# Patient Record
Sex: FEMALE | Race: WHITE | NOT HISPANIC OR LATINO | URBAN - METROPOLITAN AREA
[De-identification: names, ages, dates, MRNs, and addresses within clinical notes are randomized per-mention and may not be internally consistent; named-entity substitution may affect disease eponyms.]

---

## 2023-02-02 ENCOUNTER — INPATIENT (INPATIENT)
Facility: HOSPITAL | Age: 63
LOS: 11 days | Discharge: HOME HEALTH | End: 2023-02-14
Attending: ORTHOPAEDIC SURGERY | Admitting: ORTHOPAEDIC SURGERY
Payer: MEDICARE

## 2023-02-02 ENCOUNTER — TRANSCRIPTION ENCOUNTER (OUTPATIENT)
Age: 63
End: 2023-02-02

## 2023-02-02 VITALS
DIASTOLIC BLOOD PRESSURE: 59 MMHG | SYSTOLIC BLOOD PRESSURE: 105 MMHG | HEART RATE: 87 BPM | OXYGEN SATURATION: 98 % | TEMPERATURE: 99 F | RESPIRATION RATE: 18 BRPM

## 2023-02-02 DIAGNOSIS — S72.009A FRACTURE OF UNSPECIFIED PART OF NECK OF UNSPECIFIED FEMUR, INITIAL ENCOUNTER FOR CLOSED FRACTURE: ICD-10-CM

## 2023-02-02 LAB
ALBUMIN SERPL ELPH-MCNC: 3.9 G/DL — SIGNIFICANT CHANGE UP (ref 3.3–5)
ALBUMIN SERPL ELPH-MCNC: 4 G/DL — SIGNIFICANT CHANGE UP (ref 3.3–5)
ALP SERPL-CCNC: 98 U/L — SIGNIFICANT CHANGE UP (ref 40–120)
ALT FLD-CCNC: 15 U/L — SIGNIFICANT CHANGE UP (ref 4–33)
ANION GAP SERPL CALC-SCNC: 8 MMOL/L — SIGNIFICANT CHANGE UP (ref 7–14)
APTT BLD: 36.4 SEC — HIGH (ref 27–36.3)
AST SERPL-CCNC: 17 U/L — SIGNIFICANT CHANGE UP (ref 4–32)
BASOPHILS # BLD AUTO: 0.07 K/UL — SIGNIFICANT CHANGE UP (ref 0–0.2)
BASOPHILS NFR BLD AUTO: 4.4 % — HIGH (ref 0–2)
BILIRUB SERPL-MCNC: 0.3 MG/DL — SIGNIFICANT CHANGE UP (ref 0.2–1.2)
BLD GP AB SCN SERPL QL: NEGATIVE — SIGNIFICANT CHANGE UP
BUN SERPL-MCNC: 11 MG/DL — SIGNIFICANT CHANGE UP (ref 7–23)
CALCIUM SERPL-MCNC: 9.4 MG/DL — SIGNIFICANT CHANGE UP (ref 8.4–10.5)
CHLORIDE SERPL-SCNC: 105 MMOL/L — SIGNIFICANT CHANGE UP (ref 98–107)
CO2 SERPL-SCNC: 29 MMOL/L — SIGNIFICANT CHANGE UP (ref 22–31)
CREAT SERPL-MCNC: 0.21 MG/DL — LOW (ref 0.5–1.3)
EGFR: 131 ML/MIN/1.73M2 — SIGNIFICANT CHANGE UP
EOSINOPHIL # BLD AUTO: 0.02 K/UL — SIGNIFICANT CHANGE UP (ref 0–0.5)
EOSINOPHIL NFR BLD AUTO: 0.9 % — SIGNIFICANT CHANGE UP (ref 0–6)
GIANT PLATELETS BLD QL SMEAR: PRESENT — SIGNIFICANT CHANGE UP
GLUCOSE SERPL-MCNC: 100 MG/DL — HIGH (ref 70–99)
HCT VFR BLD CALC: 39.1 % — SIGNIFICANT CHANGE UP (ref 34.5–45)
HGB BLD-MCNC: 12 G/DL — SIGNIFICANT CHANGE UP (ref 11.5–15.5)
IANC: 0.37 K/UL — LOW (ref 1.8–7.4)
INR BLD: 0.96 RATIO — SIGNIFICANT CHANGE UP (ref 0.88–1.16)
LYMPHOCYTES # BLD AUTO: 0.67 K/UL — LOW (ref 1–3.3)
LYMPHOCYTES # BLD AUTO: 40.4 % — SIGNIFICANT CHANGE UP (ref 13–44)
MANUAL SMEAR VERIFICATION: SIGNIFICANT CHANGE UP
MCHC RBC-ENTMCNC: 28.2 PG — SIGNIFICANT CHANGE UP (ref 27–34)
MCHC RBC-ENTMCNC: 30.7 GM/DL — LOW (ref 32–36)
MCV RBC AUTO: 92 FL — SIGNIFICANT CHANGE UP (ref 80–100)
MONOCYTES # BLD AUTO: 0.25 K/UL — SIGNIFICANT CHANGE UP (ref 0–0.9)
MONOCYTES NFR BLD AUTO: 14.9 % — HIGH (ref 2–14)
NEUTROPHILS # BLD AUTO: 0.5 K/UL — LOW (ref 1.8–7.4)
NEUTROPHILS NFR BLD AUTO: 29.8 % — LOW (ref 43–77)
PLAT MORPH BLD: NORMAL — SIGNIFICANT CHANGE UP
PLATELET # BLD AUTO: 137 K/UL — LOW (ref 150–400)
PLATELET COUNT - ESTIMATE: ABNORMAL
POTASSIUM SERPL-MCNC: 3.9 MMOL/L — SIGNIFICANT CHANGE UP (ref 3.5–5.3)
POTASSIUM SERPL-SCNC: 3.9 MMOL/L — SIGNIFICANT CHANGE UP (ref 3.5–5.3)
PROT SERPL-MCNC: 6.6 G/DL — SIGNIFICANT CHANGE UP (ref 6–8.3)
PROTHROM AB SERPL-ACNC: 11.1 SEC — SIGNIFICANT CHANGE UP (ref 10.5–13.4)
RBC # BLD: 4.25 M/UL — SIGNIFICANT CHANGE UP (ref 3.8–5.2)
RBC # FLD: 15 % — HIGH (ref 10.3–14.5)
RBC BLD AUTO: NORMAL — SIGNIFICANT CHANGE UP
RH IG SCN BLD-IMP: POSITIVE — SIGNIFICANT CHANGE UP
SARS-COV-2 RNA SPEC QL NAA+PROBE: SIGNIFICANT CHANGE UP
SODIUM SERPL-SCNC: 142 MMOL/L — SIGNIFICANT CHANGE UP (ref 135–145)
VARIANT LYMPHS # BLD: 9.6 % — HIGH (ref 0–6)
WBC # BLD: 1.67 K/UL — LOW (ref 3.8–10.5)
WBC # FLD AUTO: 1.67 K/UL — LOW (ref 3.8–10.5)

## 2023-02-02 PROCEDURE — 71045 X-RAY EXAM CHEST 1 VIEW: CPT | Mod: 26

## 2023-02-02 PROCEDURE — 73552 X-RAY EXAM OF FEMUR 2/>: CPT | Mod: 26,RT

## 2023-02-02 PROCEDURE — 73600 X-RAY EXAM OF ANKLE: CPT | Mod: 26,RT

## 2023-02-02 PROCEDURE — 99285 EMERGENCY DEPT VISIT HI MDM: CPT | Mod: GC

## 2023-02-02 PROCEDURE — 73502 X-RAY EXAM HIP UNI 2-3 VIEWS: CPT | Mod: 26,RT

## 2023-02-02 PROCEDURE — 73560 X-RAY EXAM OF KNEE 1 OR 2: CPT | Mod: 26,RT

## 2023-02-02 RX ORDER — SODIUM CHLORIDE 9 MG/ML
1000 INJECTION INTRAMUSCULAR; INTRAVENOUS; SUBCUTANEOUS
Refills: 0 | Status: DISCONTINUED | OUTPATIENT
Start: 2023-02-02 | End: 2023-02-05

## 2023-02-02 RX ORDER — OXYCODONE HYDROCHLORIDE 5 MG/1
5 TABLET ORAL EVERY 4 HOURS
Refills: 0 | Status: DISCONTINUED | OUTPATIENT
Start: 2023-02-02 | End: 2023-02-08

## 2023-02-02 RX ORDER — LANOLIN ALCOHOL/MO/W.PET/CERES
3 CREAM (GRAM) TOPICAL AT BEDTIME
Refills: 0 | Status: DISCONTINUED | OUTPATIENT
Start: 2023-02-02 | End: 2023-02-14

## 2023-02-02 RX ORDER — SENNA PLUS 8.6 MG/1
2 TABLET ORAL AT BEDTIME
Refills: 0 | Status: DISCONTINUED | OUTPATIENT
Start: 2023-02-02 | End: 2023-02-14

## 2023-02-02 RX ORDER — OXYCODONE HYDROCHLORIDE 5 MG/1
2.5 TABLET ORAL EVERY 4 HOURS
Refills: 0 | Status: DISCONTINUED | OUTPATIENT
Start: 2023-02-02 | End: 2023-02-08

## 2023-02-02 RX ORDER — ACETAMINOPHEN 500 MG
1000 TABLET ORAL ONCE
Refills: 0 | Status: COMPLETED | OUTPATIENT
Start: 2023-02-02 | End: 2023-02-02

## 2023-02-02 RX ORDER — MAGNESIUM HYDROXIDE 400 MG/1
30 TABLET, CHEWABLE ORAL DAILY
Refills: 0 | Status: DISCONTINUED | OUTPATIENT
Start: 2023-02-02 | End: 2023-02-05

## 2023-02-02 RX ORDER — ACETAMINOPHEN 500 MG
975 TABLET ORAL EVERY 8 HOURS
Refills: 0 | Status: DISCONTINUED | OUTPATIENT
Start: 2023-02-02 | End: 2023-02-05

## 2023-02-02 RX ADMIN — Medication 400 MILLIGRAM(S): at 20:59

## 2023-02-02 RX ADMIN — SODIUM CHLORIDE 100 MILLILITER(S): 9 INJECTION INTRAMUSCULAR; INTRAVENOUS; SUBCUTANEOUS at 21:24

## 2023-02-02 RX ADMIN — Medication 1000 MILLIGRAM(S): at 21:24
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
## 2023-02-02 NOTE — ED PROVIDER NOTE - PHYSICAL EXAMINATION
ear pain General: non-toxic, NAD  HEENT: NCAT, PERRL  Cardiac: RRR, holosystolic murmur, 2+ peripheral pulses  Resp: CTAB  Abdomen: soft, non-distended, bowel sounds present, no ttp, no rebound or guarding. no organomegaly  Extremities: spastic with plantar flexed lower extremities. R hip internally rotated. palpable pulses with brisk capillary refill.   Skin: no rashes or ecchymoses noted.   Neuro: AAOx4, baseline muscle strength of upper extremities. 0/5 lower extremity strength bilaterally. unable to move her toes at baseline. sensation intact  Psych: mood and affect appropriate

## 2023-02-02 NOTE — ED ADULT TRIAGE NOTE - CHIEF COMPLAINT QUOTE
NP called EMS for altered mental status has hx of MS pt arrives A&Ox4  c/o right hip pain and right leg pain
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
## 2023-02-02 NOTE — ED ADULT TRIAGE NOTE - CCCP TRG CHIEF CMPLNT
pt had AIDE this am was left on her own for several hours parents were told to bring pt to hospital/pain, hip

## 2023-02-02 NOTE — H&P ADULT - ASSESSMENT
63yFemale with Right Transcervical Femoral Neck Fx    - Pain control  - IS  - Continue home medications  - Regular Diet, NPOpMN/IVF  - CBC/BMP/Coags/UA/T+S x2  - EKG/CXR  -  Medical clearance prior to planned procedure  - Plan for OR for Right hip herrera

## 2023-02-02 NOTE — ED ADULT NURSE REASSESSMENT NOTE - NS ED NURSE REASSESS COMMENT FT1
Report given to [facility] 1 RN, Alisha. Resp even and unlabored. No acute distress noted. Speaking in full and complete sentences. IV maintenance fluids running as ordered. Safety maintained.

## 2023-02-02 NOTE — ED PROVIDER NOTE - NSICDXFAMILYHX_GEN_ALL_CORE_FT
FAMILY HISTORY:  Father  Still living? Yes, Estimated age: 81-90  Family history of hypercholesterolemia, Age at diagnosis: Age Unknown  Family history of hypertension, Age at diagnosis: Age Unknown    Mother  Still living? Yes, Estimated age: 71-80  Family history of breast cancer, Age at diagnosis: Age Unknown  Family history of hypertension, Age at diagnosis: Age Unknown

## 2023-02-02 NOTE — ED PROVIDER NOTE - OBJECTIVE STATEMENT
63y female hx MS bedbound s/p fall to R knee 11y ago presents after her visiting NP discovered that she was home alone because her 24/7 aide service did not show up today. Pt notes that this aide service is new since january, and at the end of december, the new aide turned her with her affected leg up, which was never done and immediately caused her pain. Since that time, she has had persistent pain in the hip knee and ankle on the right side. no numbness or tingling. unable to walk or move her bilateral lower extremities at baseline. did not hear a pop or crack at the time. otherwise feels in her usual state of health.
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
## 2023-02-02 NOTE — ED PROVIDER NOTE - PROGRESS NOTE DETAILS
PHYSICAL THERAPY TREATMENT:    Date: 8/10/2017    Visit # since seen by PT:  2  ASSESSMENT:   Patient is a 60 year old female admitted to [facility] for right TKA. Lives alone but planning to have daughter stay with her initially for assist prn.      Patient is displaying good progress as evidenced by tolerance for activity despite pain rating of 8/10.      At this time the patient continues to demonstrate decreased range of motion, decreased strength, pain, decreased activity tolerance which is limiting the completion of all functional mobility.  Further skilled physical therapy is required to address these limitations in an attempt to maximize the patient's independence.    After today's session, this therapist is recommending the following location for optimal discharge:    Recommendation for Discharge: PT: Home, Home therapy, OP therapy  Recommendations for Discharge: OT: Home (assist with ADLs and IADLs prn)       Equipment for discharge: 2 wheeled walker     Focus of today's therapy session:   review of the HEP and gait trainng.  See below for further details.    Treatment Plan for Next Session:   transfers, ambulation, stairs, therapeutic exercise    Precautions: WBAT RLE  Activity orders: as tolerated  Basic Lines: Capped IV    SUBJECTIVE:   Patient reports agreeable to therapy    Cognition: Alert and [place] x3    Pain:  At Rest: 8/10  With Activity: 8/10     MOBILITY/EXERCISE:    Bed mobility:   Sit to Supine: Supervision (sup)    Transfers:   Device Used: 2 wheeled walker  Sit to Stand: Stand By Assistance (SBA)  Stand to Sit: Stand By Assistance (SBA)  Stand Pivot: Stand By Assistance (SBA)    Ambulation:   ambulated 300' with CGA and 2ww with a continuous pattern and decreased stance on the Rt.       Stairs:   Not addressed this session    Balance:  Static sitting: Independent  Dynamic sitting: Independent  Static standing:  Touching/Steadying Assistance  Dynamic standing: Touching/Steadying  Assistance    Exercises:  Supine: Ankle Pumps, Quad Sets, Terminal Knee Extension, Straight Leg Raises, Heel Slides, Hip Abduction/Adduction  Sitting: Knee Flexion, Knee Extension     OBSERVATION:   Vital Signs:   not warranted at this time    Activity Tolerance:   no limits in patient's ability to participate in session     EDUCATION:    Learner(s): patient  Education topics covered this session:  Bed Mobility, Transfers, Gait, Safety Awareness, HEP/Exercises, Therapy Plan of Care and Home Safety  Please refer to patient education record for further information regarding patient's learning assessment.     GOALS:     Review Date: 8/16/2017  1. Patient will complete HEP with [place].  Goal met 8/10/17   2. Patient will complete bed mobility with Modified [place] (mod I).  Goal met 8/10/17   3. Patient will complete stand pivot transfer with Modified [place] (mod I) and 4 wheeled walker.      4. Patient will ambulate 150 feet with Modified [place] (mod I) and 4 wheeled walker.      5. Patient will demonstrate 5-90 degrees right knee flexion.         Rehab potential is good due to the following positive factors age, family support, motivation level; and is impacted by the following negative factors not apparent at this time     PLAN OF CARE:    PT Frequency: Twice a day  Duration: LOS  Treatment/Interventions: Functional transfer training, Strengthening, ROM, Gait training, Bed mobility     PT Time Spent: 22 minutes (08/10/17 3734)   Adolph, PGY3: patient w/ impacted femoral neck fracture. Ortho aware and will evaluate Brandy Anthony PGY-3 Patient signed out to me by previous team pending Ortho evaluation.  Patient nonambulatory at baseline found to have impacted femoral neck fracture.   plan for patient to the OR.

## 2023-02-02 NOTE — ED PROVIDER NOTE - NS ED ROS FT
Constitutional: no fevers, chills  HEENT: no HA, vision changes, rhinorrhea, sore throat  Cardiac: no chest pain, palpitations  Respiratory: no SOB, cough or hemoptysis  GI: no n/v/d/c, abd pain, bloody or dark stools  : no dysuria, frequency, or hematuria  MSK: +joint pain, no neck pain no back pain  Skin: no rashes, jaundice, pruritis  Neuro: no numbness/tingling, baseline weakness, bedbound.   ROS otherwise negative except per hpi

## 2023-02-03 ENCOUNTER — TRANSCRIPTION ENCOUNTER (OUTPATIENT)
Age: 63
End: 2023-02-03

## 2023-02-03 DIAGNOSIS — S72.001A FRACTURE OF UNSPECIFIED PART OF NECK OF RIGHT FEMUR, INITIAL ENCOUNTER FOR CLOSED FRACTURE: ICD-10-CM

## 2023-02-03 DIAGNOSIS — R63.8 OTHER SYMPTOMS AND SIGNS CONCERNING FOOD AND FLUID INTAKE: ICD-10-CM

## 2023-02-03 DIAGNOSIS — G50.0 TRIGEMINAL NEURALGIA: ICD-10-CM

## 2023-02-03 DIAGNOSIS — G35 MULTIPLE SCLEROSIS: ICD-10-CM

## 2023-02-03 DIAGNOSIS — F32.9 MAJOR DEPRESSIVE DISORDER, SINGLE EPISODE, UNSPECIFIED: ICD-10-CM

## 2023-02-03 LAB
24R-OH-CALCIDIOL SERPL-MCNC: 74 NG/ML — SIGNIFICANT CHANGE UP (ref 30–80)
ANION GAP SERPL CALC-SCNC: 12 MMOL/L — SIGNIFICANT CHANGE UP (ref 7–14)
ANION GAP SERPL CALC-SCNC: 13 MMOL/L — SIGNIFICANT CHANGE UP (ref 7–14)
ANION GAP SERPL CALC-SCNC: 9 MMOL/L — SIGNIFICANT CHANGE UP (ref 7–14)
APTT BLD: 38 SEC — HIGH (ref 27–36.3)
BLD GP AB SCN SERPL QL: NEGATIVE — SIGNIFICANT CHANGE UP
BUN SERPL-MCNC: 10 MG/DL — SIGNIFICANT CHANGE UP (ref 7–23)
BUN SERPL-MCNC: 10 MG/DL — SIGNIFICANT CHANGE UP (ref 7–23)
BUN SERPL-MCNC: 6 MG/DL — LOW (ref 7–23)
CALCIUM SERPL-MCNC: 8.9 MG/DL — SIGNIFICANT CHANGE UP (ref 8.4–10.5)
CALCIUM SERPL-MCNC: 8.9 MG/DL — SIGNIFICANT CHANGE UP (ref 8.4–10.5)
CALCIUM SERPL-MCNC: 9 MG/DL — SIGNIFICANT CHANGE UP (ref 8.4–10.5)
CHLORIDE SERPL-SCNC: 105 MMOL/L — SIGNIFICANT CHANGE UP (ref 98–107)
CHLORIDE SERPL-SCNC: 107 MMOL/L — SIGNIFICANT CHANGE UP (ref 98–107)
CHLORIDE SERPL-SCNC: 108 MMOL/L — HIGH (ref 98–107)
CO2 SERPL-SCNC: 24 MMOL/L — SIGNIFICANT CHANGE UP (ref 22–31)
CO2 SERPL-SCNC: 24 MMOL/L — SIGNIFICANT CHANGE UP (ref 22–31)
CO2 SERPL-SCNC: 25 MMOL/L — SIGNIFICANT CHANGE UP (ref 22–31)
CREAT SERPL-MCNC: 0.2 MG/DL — LOW (ref 0.5–1.3)
CREAT SERPL-MCNC: 0.23 MG/DL — LOW (ref 0.5–1.3)
CREAT SERPL-MCNC: <0.2 MG/DL — LOW (ref 0.5–1.3)
EGFR: 128 ML/MIN/1.73M2 — SIGNIFICANT CHANGE UP
EGFR: 132 ML/MIN/1.73M2 — SIGNIFICANT CHANGE UP
EGFR: 132 ML/MIN/1.73M2 — SIGNIFICANT CHANGE UP
GLUCOSE SERPL-MCNC: 123 MG/DL — HIGH (ref 70–99)
GLUCOSE SERPL-MCNC: 89 MG/DL — SIGNIFICANT CHANGE UP (ref 70–99)
GLUCOSE SERPL-MCNC: 90 MG/DL — SIGNIFICANT CHANGE UP (ref 70–99)
HCG SERPL-ACNC: 7.1 MIU/ML — SIGNIFICANT CHANGE UP
HCT VFR BLD CALC: 37.2 % — SIGNIFICANT CHANGE UP (ref 34.5–45)
HCT VFR BLD CALC: 43.1 % — SIGNIFICANT CHANGE UP (ref 34.5–45)
HGB BLD-MCNC: 11.3 G/DL — LOW (ref 11.5–15.5)
HGB BLD-MCNC: 13.5 G/DL — SIGNIFICANT CHANGE UP (ref 11.5–15.5)
INR BLD: 0.95 RATIO — SIGNIFICANT CHANGE UP (ref 0.88–1.16)
MCHC RBC-ENTMCNC: 28.1 PG — SIGNIFICANT CHANGE UP (ref 27–34)
MCHC RBC-ENTMCNC: 28.8 PG — SIGNIFICANT CHANGE UP (ref 27–34)
MCHC RBC-ENTMCNC: 30.4 GM/DL — LOW (ref 32–36)
MCHC RBC-ENTMCNC: 31.3 GM/DL — LOW (ref 32–36)
MCV RBC AUTO: 91.9 FL — SIGNIFICANT CHANGE UP (ref 80–100)
MCV RBC AUTO: 92.5 FL — SIGNIFICANT CHANGE UP (ref 80–100)
NRBC # BLD: 0 /100 WBCS — SIGNIFICANT CHANGE UP (ref 0–0)
NRBC # BLD: 0 /100 WBCS — SIGNIFICANT CHANGE UP (ref 0–0)
NRBC # FLD: 0 K/UL — SIGNIFICANT CHANGE UP (ref 0–0)
NRBC # FLD: 0 K/UL — SIGNIFICANT CHANGE UP (ref 0–0)
PLATELET # BLD AUTO: 135 K/UL — LOW (ref 150–400)
PLATELET # BLD AUTO: 159 K/UL — SIGNIFICANT CHANGE UP (ref 150–400)
POTASSIUM SERPL-MCNC: 3.3 MMOL/L — LOW (ref 3.5–5.3)
POTASSIUM SERPL-MCNC: 4.4 MMOL/L — SIGNIFICANT CHANGE UP (ref 3.5–5.3)
POTASSIUM SERPL-MCNC: 5.3 MMOL/L — SIGNIFICANT CHANGE UP (ref 3.5–5.3)
POTASSIUM SERPL-SCNC: 3.3 MMOL/L — LOW (ref 3.5–5.3)
POTASSIUM SERPL-SCNC: 4.4 MMOL/L — SIGNIFICANT CHANGE UP (ref 3.5–5.3)
POTASSIUM SERPL-SCNC: 5.3 MMOL/L — SIGNIFICANT CHANGE UP (ref 3.5–5.3)
PROTHROM AB SERPL-ACNC: 11 SEC — SIGNIFICANT CHANGE UP (ref 10.5–13.4)
RBC # BLD: 4.02 M/UL — SIGNIFICANT CHANGE UP (ref 3.8–5.2)
RBC # BLD: 4.69 M/UL — SIGNIFICANT CHANGE UP (ref 3.8–5.2)
RBC # FLD: 15 % — HIGH (ref 10.3–14.5)
RBC # FLD: 15.1 % — HIGH (ref 10.3–14.5)
RH IG SCN BLD-IMP: POSITIVE — SIGNIFICANT CHANGE UP
SODIUM SERPL-SCNC: 142 MMOL/L — SIGNIFICANT CHANGE UP (ref 135–145)
SODIUM SERPL-SCNC: 142 MMOL/L — SIGNIFICANT CHANGE UP (ref 135–145)
SODIUM SERPL-SCNC: 143 MMOL/L — SIGNIFICANT CHANGE UP (ref 135–145)
WBC # BLD: 2 K/UL — LOW (ref 3.8–10.5)
WBC # BLD: 5.32 K/UL — SIGNIFICANT CHANGE UP (ref 3.8–10.5)
WBC # FLD AUTO: 2 K/UL — LOW (ref 3.8–10.5)
WBC # FLD AUTO: 5.32 K/UL — SIGNIFICANT CHANGE UP (ref 3.8–10.5)

## 2023-02-03 PROCEDURE — 72170 X-RAY EXAM OF PELVIS: CPT | Mod: 26

## 2023-02-03 PROCEDURE — 27236 TREAT THIGH FRACTURE: CPT | Mod: RT

## 2023-02-03 PROCEDURE — 99497 ADVNCD CARE PLAN 30 MIN: CPT

## 2023-02-03 PROCEDURE — 99223 1ST HOSP IP/OBS HIGH 75: CPT

## 2023-02-03 PROCEDURE — 93306 TTE W/DOPPLER COMPLETE: CPT | Mod: 26

## 2023-02-03 DEVICE — IMPLANTABLE DEVICE: Type: IMPLANTABLE DEVICE | Site: RIGHT | Status: FUNCTIONAL

## 2023-02-03 DEVICE — HEAD FEM BI-P UHR 26MM 42MM: Type: IMPLANTABLE DEVICE | Site: RIGHT | Status: FUNCTIONAL

## 2023-02-03 DEVICE — RESTRIC CEM BUCK 18.5MM: Type: IMPLANTABLE DEVICE | Site: RIGHT | Status: FUNCTIONAL

## 2023-02-03 DEVICE — HEAD FEM LFIT V40 26MM -3MM NK: Type: IMPLANTABLE DEVICE | Site: RIGHT | Status: FUNCTIONAL

## 2023-02-03 DEVICE — CEMENT SIMPLEX P 40GM: Type: IMPLANTABLE DEVICE | Site: RIGHT | Status: FUNCTIONAL

## 2023-02-03 RX ORDER — ONDANSETRON 8 MG/1
4 TABLET, FILM COATED ORAL EVERY 6 HOURS
Refills: 0 | Status: DISCONTINUED | OUTPATIENT
Start: 2023-02-03 | End: 2023-02-14

## 2023-02-03 RX ORDER — QUETIAPINE FUMARATE 200 MG/1
50 TABLET, FILM COATED ORAL AT BEDTIME
Refills: 0 | Status: DISCONTINUED | OUTPATIENT
Start: 2023-02-03 | End: 2023-02-14

## 2023-02-03 RX ORDER — ATORVASTATIN CALCIUM 80 MG/1
1 TABLET, FILM COATED ORAL
Qty: 0 | Refills: 0 | DISCHARGE

## 2023-02-03 RX ORDER — HYDROMORPHONE HYDROCHLORIDE 2 MG/ML
0.25 INJECTION INTRAMUSCULAR; INTRAVENOUS; SUBCUTANEOUS
Refills: 0 | Status: DISCONTINUED | OUTPATIENT
Start: 2023-02-03 | End: 2023-02-03

## 2023-02-03 RX ORDER — QUETIAPINE FUMARATE 200 MG/1
1 TABLET, FILM COATED ORAL
Qty: 0 | Refills: 0 | DISCHARGE

## 2023-02-03 RX ORDER — ALPRAZOLAM 0.25 MG
1 TABLET ORAL
Qty: 0 | Refills: 0 | DISCHARGE

## 2023-02-03 RX ORDER — TRAZODONE HCL 50 MG
100 TABLET ORAL ONCE
Refills: 0 | Status: COMPLETED | OUTPATIENT
Start: 2023-02-03 | End: 2023-02-03

## 2023-02-03 RX ORDER — DEXTROAMPHETAMINE SACCHARATE, AMPHETAMINE ASPARTATE, DEXTROAMPHETAMINE SULFATE AND AMPHETAMINE SULFATE 1.875; 1.875; 1.875; 1.875 MG/1; MG/1; MG/1; MG/1
1 TABLET ORAL
Qty: 0 | Refills: 0 | DISCHARGE

## 2023-02-03 RX ORDER — ALPRAZOLAM 0.25 MG
0.5 TABLET ORAL EVERY 12 HOURS
Refills: 0 | Status: DISCONTINUED | OUTPATIENT
Start: 2023-02-03 | End: 2023-02-09

## 2023-02-03 RX ORDER — APIXABAN 2.5 MG/1
2.5 TABLET, FILM COATED ORAL EVERY 12 HOURS
Refills: 0 | Status: DISCONTINUED | OUTPATIENT
Start: 2023-02-03 | End: 2023-02-14

## 2023-02-03 RX ORDER — HYDROMORPHONE HYDROCHLORIDE 2 MG/ML
0.5 INJECTION INTRAMUSCULAR; INTRAVENOUS; SUBCUTANEOUS
Refills: 0 | Status: DISCONTINUED | OUTPATIENT
Start: 2023-02-03 | End: 2023-02-03

## 2023-02-03 RX ORDER — BACLOFEN 100 %
5 POWDER (GRAM) MISCELLANEOUS EVERY 8 HOURS
Refills: 0 | Status: DISCONTINUED | OUTPATIENT
Start: 2023-02-03 | End: 2023-02-14

## 2023-02-03 RX ORDER — GABAPENTIN 400 MG/1
300 CAPSULE ORAL EVERY 8 HOURS
Refills: 0 | Status: DISCONTINUED | OUTPATIENT
Start: 2023-02-03 | End: 2023-02-14

## 2023-02-03 RX ORDER — TRAZODONE HCL 50 MG
100 TABLET ORAL AT BEDTIME
Refills: 0 | Status: DISCONTINUED | OUTPATIENT
Start: 2023-02-03 | End: 2023-02-14

## 2023-02-03 RX ORDER — POVIDONE-IODINE 5 %
1 AEROSOL (ML) TOPICAL ONCE
Refills: 0 | Status: DISCONTINUED | OUTPATIENT
Start: 2023-02-03 | End: 2023-02-14

## 2023-02-03 RX ORDER — BACLOFEN 100 %
1 POWDER (GRAM) MISCELLANEOUS
Qty: 0 | Refills: 0 | DISCHARGE

## 2023-02-03 RX ORDER — SODIUM CHLORIDE 9 MG/ML
1000 INJECTION, SOLUTION INTRAVENOUS
Refills: 0 | Status: DISCONTINUED | OUTPATIENT
Start: 2023-02-03 | End: 2023-02-03

## 2023-02-03 RX ORDER — POTASSIUM CHLORIDE 20 MEQ
40 PACKET (EA) ORAL EVERY 4 HOURS
Refills: 0 | Status: COMPLETED | OUTPATIENT
Start: 2023-02-03 | End: 2023-02-03

## 2023-02-03 RX ORDER — TRAZODONE HCL 50 MG
1 TABLET ORAL
Qty: 0 | Refills: 0 | DISCHARGE

## 2023-02-03 RX ORDER — MODAFINIL 200 MG/1
1 TABLET ORAL
Qty: 0 | Refills: 0 | DISCHARGE

## 2023-02-03 RX ORDER — POTASSIUM CHLORIDE 20 MEQ
10 PACKET (EA) ORAL
Refills: 0 | Status: DISCONTINUED | OUTPATIENT
Start: 2023-02-03 | End: 2023-02-03

## 2023-02-03 RX ORDER — ESCITALOPRAM OXALATE 10 MG/1
20 TABLET, FILM COATED ORAL DAILY
Refills: 0 | Status: DISCONTINUED | OUTPATIENT
Start: 2023-02-03 | End: 2023-02-14

## 2023-02-03 RX ORDER — POLYETHYLENE GLYCOL 3350 17 G/17G
17 POWDER, FOR SOLUTION ORAL DAILY
Refills: 0 | Status: DISCONTINUED | OUTPATIENT
Start: 2023-02-03 | End: 2023-02-14

## 2023-02-03 RX ORDER — ONDANSETRON 8 MG/1
4 TABLET, FILM COATED ORAL ONCE
Refills: 0 | Status: DISCONTINUED | OUTPATIENT
Start: 2023-02-03 | End: 2023-02-03

## 2023-02-03 RX ORDER — CEFAZOLIN SODIUM 1 G
2000 VIAL (EA) INJECTION EVERY 8 HOURS
Refills: 0 | Status: COMPLETED | OUTPATIENT
Start: 2023-02-03 | End: 2023-02-04

## 2023-02-03 RX ORDER — ACETAMINOPHEN 500 MG
975 TABLET ORAL EVERY 8 HOURS
Refills: 0 | Status: DISCONTINUED | OUTPATIENT
Start: 2023-02-03 | End: 2023-02-07

## 2023-02-03 RX ORDER — CHLORHEXIDINE GLUCONATE 213 G/1000ML
1 SOLUTION TOPICAL ONCE
Refills: 0 | Status: COMPLETED | OUTPATIENT
Start: 2023-02-03 | End: 2023-02-03

## 2023-02-03 RX ORDER — LAMOTRIGINE 25 MG/1
100 TABLET, ORALLY DISINTEGRATING ORAL EVERY 12 HOURS
Refills: 0 | Status: DISCONTINUED | OUTPATIENT
Start: 2023-02-03 | End: 2023-02-14

## 2023-02-03 RX ORDER — FENTANYL CITRATE 50 UG/ML
25 INJECTION INTRAVENOUS
Refills: 0 | Status: DISCONTINUED | OUTPATIENT
Start: 2023-02-03 | End: 2023-02-03

## 2023-02-03 RX ADMIN — Medication 975 MILLIGRAM(S): at 22:30

## 2023-02-03 RX ADMIN — HYDROMORPHONE HYDROCHLORIDE 0.5 MILLIGRAM(S): 2 INJECTION INTRAMUSCULAR; INTRAVENOUS; SUBCUTANEOUS at 16:00

## 2023-02-03 RX ADMIN — HYDROMORPHONE HYDROCHLORIDE 0.5 MILLIGRAM(S): 2 INJECTION INTRAMUSCULAR; INTRAVENOUS; SUBCUTANEOUS at 15:30

## 2023-02-03 RX ADMIN — APIXABAN 2.5 MILLIGRAM(S): 2.5 TABLET, FILM COATED ORAL at 18:20

## 2023-02-03 RX ADMIN — Medication 100 MILLIGRAM(S): at 21:23

## 2023-02-03 RX ADMIN — Medication 975 MILLIGRAM(S): at 21:12

## 2023-02-03 RX ADMIN — GABAPENTIN 300 MILLIGRAM(S): 400 CAPSULE ORAL at 05:52

## 2023-02-03 RX ADMIN — GABAPENTIN 300 MILLIGRAM(S): 400 CAPSULE ORAL at 16:30

## 2023-02-03 RX ADMIN — Medication 975 MILLIGRAM(S): at 16:00

## 2023-02-03 RX ADMIN — QUETIAPINE FUMARATE 50 MILLIGRAM(S): 200 TABLET, FILM COATED ORAL at 21:13

## 2023-02-03 RX ADMIN — OXYCODONE HYDROCHLORIDE 5 MILLIGRAM(S): 5 TABLET ORAL at 16:50

## 2023-02-03 RX ADMIN — SODIUM CHLORIDE 100 MILLILITER(S): 9 INJECTION INTRAMUSCULAR; INTRAVENOUS; SUBCUTANEOUS at 10:19

## 2023-02-03 RX ADMIN — Medication 40 MILLIEQUIVALENT(S): at 10:20

## 2023-02-03 RX ADMIN — Medication 5 MILLIGRAM(S): at 18:19

## 2023-02-03 RX ADMIN — GABAPENTIN 300 MILLIGRAM(S): 400 CAPSULE ORAL at 21:13

## 2023-02-03 RX ADMIN — LAMOTRIGINE 100 MILLIGRAM(S): 25 TABLET, ORALLY DISINTEGRATING ORAL at 21:13

## 2023-02-03 RX ADMIN — SENNA PLUS 2 TABLET(S): 8.6 TABLET ORAL at 21:13

## 2023-02-03 RX ADMIN — Medication 975 MILLIGRAM(S): at 05:52

## 2023-02-03 RX ADMIN — Medication 975 MILLIGRAM(S): at 17:00

## 2023-02-03 RX ADMIN — Medication 0.5 MILLIGRAM(S): at 00:50

## 2023-02-03 RX ADMIN — LAMOTRIGINE 100 MILLIGRAM(S): 25 TABLET, ORALLY DISINTEGRATING ORAL at 10:20

## 2023-02-03 RX ADMIN — SODIUM CHLORIDE 75 MILLILITER(S): 9 INJECTION, SOLUTION INTRAVENOUS at 15:31

## 2023-02-03 RX ADMIN — Medication 40 MILLIEQUIVALENT(S): at 05:52

## 2023-02-03 NOTE — PRE-OP CHECKLIST - HEIGHT IN CM
Pt reports her  from [facility] had reported that the information on her paperwork indicates she gets migraines 2-3 times a month and she needs it changed to that she gets it 2-3 times a week.  Pt states we can just write a letter indicating the time frequency change.    Routed to Dr. Feliciano to please advise on letter.    157.5

## 2023-02-03 NOTE — PROGRESS NOTE ADULT - ASSESSMENT
A/P  63yFemale with Right Transcervical Femoral Neck Fx    - Pain control  - IS  - Continue home medications- will discuss with neuro post operatively for inpatient medication recommendations   - Regular Diet, NPO, IVF  - FU AM pre op labs  - FU echo per medicine recommendations for cardiac clearance for surgery   - Plan for OR for Right hip herrera

## 2023-02-03 NOTE — CONSULT NOTE ADULT - PROBLEM SELECTOR RECOMMENDATION 2
-consult neurology in am to approve Vumerity patient has her medications at bedside  -c/w baclofen 5 mg TID prn muscle spasms

## 2023-02-03 NOTE — CONSULT NOTE ADULT - ATTENDING COMMENTS
Resting TTE without significant abnormality.  Patient is without evidence of active cardiac ischemia, uncontrolled arrhythmia, or decompensated heart failure.  No cardiac contraindication to the planned procedure.

## 2023-02-03 NOTE — PATIENT PROFILE ADULT - NSPROGENOTHERPROVIDER_GEN_A_NUR
CHIEF COMPLAINT  Hypertension, labs    HPI  Laureen Munoz is a 70 y.o. male who presents today for the following     Hypertension  Meds: Metoprolol 25 mg QD, losartan, 100 mg QD; taking as prescribed.   He did not check BP at home.  Denies: - headaches, vision problems, tinnitus.  - chest pain/pressure, palpitations, irregular heart beats, exertional, dyspnea, peripheral edema.  - medication side effect: unusual fatigue, slow heartbeat, foot/leg swelling.  Low salt diet: no  Diet: regular  Exercise: < 4 d/w  BMI: 35  FH of HTN: neg     Hypercholesterolemia, Gilbert sy  The patient had slightly elevated cholesterol/triglycerides.   No meds.  Diet / Exercise/BMI: As above  FH: neg  LFTs were normal, except slightly elevated total bilirubin.     IFG  The patient had elevated FBG.  No polydipsia, polyphagia, polyuria.  No abdominal pain, weight loss, fatigue.  Diet / Exercise/BMI: As above  FH of DM: neg     Hypovitaminosis D  The patient had low vitamin D level.  Vitamin D supplement: started high dose.     Thrombocytopenia  The patient had borderline low PLT count.  Denies easy bleeding or bruising.     BPH  Nocturia: 2-3   - was on multiple meds in past    Denies:   · Frequency  · Urgency  · Weak stream/dribbling  · Bladder fullness / incomplete emptying  · Hesitancy     Reviewed PMH, PSH, FH, SH, ALL, HCM/IMM, no changes  Reviewed MEDS, no changes    Patient Active Problem List    Diagnosis Date Noted   • Right knee pain 05/28/2020   • Primary osteoarthritis of right knee 05/28/2020   • IFG (impaired fasting glucose) 05/28/2020   • Rosacea 10/30/2019   • Oral lichen planus 04/30/2019   • Gilbert syndrome 04/30/2019   • Erectile dysfunction 10/30/2018   • Health care maintenance 10/30/2018   • Tobacco use 10/30/2018   • Obesity (BMI 30-39.9) 07/26/2018   • Hypovitaminosis D 04/11/2014   • Neuropathy of both feet 07/20/2012   • Essential hypertension 07/20/2012   • Hypercholesteremia 07/20/2012   • Benign prostatic  hyperplasia 2012     CURRENT MEDICATIONS  Current Outpatient Medications   Medication Sig Dispense Refill   • cyclobenzaprine (FLEXERIL) 10 mg Tab Take 1 Tab by mouth at bedtime as needed for Muscle Spasms. 20 Tab 0   • Cyanocobalamin (VITAMIN B-12 PO) Take  by mouth.     • losartan (COZAAR) 100 MG Tab Take 1 Tab by mouth every day. 90 Tab 3   • metoprolol SR (TOPROL XL) 25 MG TABLET SR 24 HR TAKE ONE TABLET BY MOUTH ONE TIME DAILY 90 Tab 3   • vitamin D, Ergocalciferol, (DRISDOL) 1.25 MG (89197 UT) Cap capsule Take 1 Cap by mouth every 7 days. (Patient not taking: Reported on 12/3/2020) 12 Cap 3     No current facility-administered medications for this visit.     ALLERGIES  Allergies: Nkda [no known drug allergy] and Seasonal  PAST MEDICAL HISTORY  Past Medical History:   Diagnosis Date   • Hypercholesterolemia    • Hypertension    • Tinea inguinalis      SURGICAL HISTORY  He  has a past surgical history that includes other abdominal surgery.  SOCIAL HISTORY  Social History     Tobacco Use   • Smoking status: Current Every Day Smoker     Packs/day: 0.25     Types: Cigars, Cigarettes     Last attempt to quit:      Years since quittin.2   • Smokeless tobacco: Never Used   • Tobacco comment: cigar, smokes one a day but no cigarettes   Substance Use Topics   • Alcohol use: Yes     Comment: minimal   • Drug use: Not Currently     Types: Marijuana, Inhaled     Comment: rare occasions,occasionally      Social History     Social History Narrative   • Not on file     FAMILY HISTORY  Family History   Problem Relation Age of Onset   • Heart Disease Mother    • Heart Attack Mother    • Heart Disease Father    • Heart Attack Father    • Cancer Sister         leukemia, breast cancer   • Stroke Sister    • No Known Problems Maternal Grandmother    • No Known Problems Maternal Grandfather    • No Known Problems Paternal Grandmother    • Dementia Paternal Grandfather    • Hypertension Neg Hx    • Hyperlipidemia Neg  "Hx    • Diabetes Neg Hx      Family Status   Relation Name Status   • Mo     • Fa     • Sis     • Sis  Alive   • MGMo     • MGFa     • PGMo     • PGFa     • Neg Hx  (Not Specified)     ROS   Constitutional: Negative for fever, chills, fatigue.  HENT: Negative for congestion, sore throat.  Eyes: Negative for vision problems.   Respiratory: Negative for cough, shortness of breath.  Cardiovascular: Negative for chest pain, palpitations.   Gastrointestinal: Negative for heartburn, nausea, abdominal pain.   : per HPI.  Musculoskeletal: C/o RT knee pain.   Skin: Negative for rash.   Neuro: Negative for dizziness, weakness and headaches.   Endo/Heme/Allergies: Does not bruise/bleed easily.   Psychiatric/Behavioral: Negative for depression.    PHYSICAL EXAM   Blood Pressure 138/72 (BP Location: Left arm, Patient Position: Sitting, BP Cuff Size: Adult)   Pulse (Abnormal) 54   Temperature 37.2 °C (98.9 °F) (Temporal)   Height 1.803 m (5' 11\")   Weight 115 kg (254 lb)   Oxygen Saturation 95%   Body Mass Index 35.43 kg/m²   General:  NAD, well appearing  HEENT:   NC/AT, PERRLA, EOMI.  Cardiovascular: unlabored breathing, no peripheral cyanosis or swelling.  Lungs:   no respiratory distress.  Abdomen: non- distended.  Extremities:  No LE swelling.  Skin:  Warm, dry.  No erythema. No rash.   Neurologic: Alert & oriented x 3. CN II-XII grossly intact. No focal deficits.  Psychiatric:  Affect normal, mood normal, judgment normal.    Labs     Labs are reviewed and discussed with a patient  Lab Results   Component Value Date/Time    CHOLSTRLTOT 192 2021 08:52 AM     (H) 2021 08:52 AM    HDL 42 2021 08:52 AM    TRIGLYCERIDE 130 2021 08:52 AM       Lab Results   Component Value Date/Time    SODIUM 136 2021 08:52 AM    POTASSIUM 4.3 2021 08:52 AM    CHLORIDE 103 2021 08:52 AM    CO2 22 2021 08:52 AM    GLUCOSE 98 " 03/03/2021 08:52 AM    BUN 28 (H) 03/03/2021 08:52 AM    CREATININE 0.86 03/03/2021 08:52 AM    CREATININE 0.92 03/01/2010 04:54 PM    BUNCREATRAT 21 03/01/2010 04:54 PM    GLOMRATE >59 03/01/2010 04:54 PM     Lab Results   Component Value Date/Time    ALKPHOSPHAT 97 03/03/2021 08:52 AM    ASTSGOT 26 03/03/2021 08:52 AM    ALTSGPT 36 03/03/2021 08:52 AM    TBILIRUBIN 1.0 03/03/2021 08:52 AM      Lab Results   Component Value Date/Time    HBA1C 6.2 (H) 03/03/2021 08:52 AM    HBA1C 6.1 (H) 09/25/2020 08:55 AM     No results found for: TSH  No results found for: FREET4    Lab Results   Component Value Date/Time    WBC 8.2 03/03/2021 08:52 AM    WBC 9.4 03/01/2010 04:54 PM    RBC 5.12 03/03/2021 08:52 AM    RBC 5.39 03/01/2010 04:54 PM    HEMOGLOBIN 15.6 03/03/2021 08:52 AM    HEMATOCRIT 47.5 03/03/2021 08:52 AM    MCV 92.8 03/03/2021 08:52 AM    MCV 89 03/01/2010 04:54 PM    MCH 30.5 03/03/2021 08:52 AM    MCH 29.3 03/01/2010 04:54 PM    MCHC 32.8 (L) 03/03/2021 08:52 AM    MPV 11.4 03/03/2021 08:52 AM    NEUTSPOLYS 68.90 03/03/2021 08:52 AM    LYMPHOCYTES 17.00 (L) 03/03/2021 08:52 AM    MONOCYTES 11.60 03/03/2021 08:52 AM    EOSINOPHILS 1.10 03/03/2021 08:52 AM    BASOPHILS 0.50 03/03/2021 08:52 AM    HYPOCHROMIA 1+ 07/15/2013 10:35 AM      Imaging     None    Assessment and Plan     Laureen Munoz is a 70 y.o. male    1. Essential hypertension  Controlled, continue with current treatment.  - Comp Metabolic Panel; Standing  - losartan (COZAAR) 100 MG Tab; Take 1 tablet by mouth every day.  Dispense: 90 tablet; Refill: 3  - metoprolol SR (TOPROL XL) 25 MG TABLET SR 24 HR; TAKE ONE TABLET BY MOUTH ONE TIME DAILY  Dispense: 90 tablet; Refill: 3    2. Hypercholesteremia  Controlled, continue with current treatment.  - Comp Metabolic Panel; Standing  - Lipid Profile; Standing    3. Gilbert syndrome  Reasurance  - Comp Metabolic Panel; Standing    4. IFG (impaired fasting glucose)  Discussed about risk to develop DM.    Advised low carb diet, exercise, watch for WT.   - Comp Metabolic Panel; Standing  - HEMOGLOBIN A1C; Standing    5. Thrombocytopenia (HCC)  Stable, borderline, follow-up labs  - CBC WITH DIFFERENTIAL; Standing    6. BPH associated with nocturia  Reviewed previous medications, start:  - finasteride (PROSCAR) 5 MG Tab; Take 1 tablet by mouth every day.  Dispense: 90 tablet; Refill: 3    Counseling:   - Smoking:  Nonsmoker    Followup: Return in about 6 months (around 9/9/2021) for ANNUAL, HEALTH .    All questions are answered.    Please note that this dictation was created using voice recognition software, and that there might be errors of barbara and possibly content.       none

## 2023-02-03 NOTE — PATIENT PROFILE ADULT - FALL HARM RISK - HARM RISK INTERVENTIONS

## 2023-02-03 NOTE — DISCHARGE NOTE PROVIDER - NSDCCPTREATMENT_GEN_ALL_CORE_FT
PRINCIPAL PROCEDURE  Procedure: Hemiarthroplasty of right hip  Findings and Treatment: Pain control:   Standing:         -Acetaminophen 500mg - 2 tabs every 8 hours  As needed:        -Tramadol 50mg - 1 tab every 6 hours - Take only if needed for MODERATE pain       -oxycodone 5mg - 1 tab every 4-6 hours - Take only if needed for SEVERE or BREAKTHROUGH pain  Oxycodone and Tramadol have been sent to your pharmacy. Please do not drive, operate machinery, or make important decisions while taking these medications.   Other Medications: (Standing)  -Protonix 40mg - 1 tab every 24 hours - to prevent stomach irritation/ulcers  -Senna 8.6mg - 2 pills every 24 hours - stool softener  -Miralax 17g - daily - constipation   Follow up: Please follow up at your prescheduled post-operative follow up appointment with Dr. Fitzpatrick for 2 weeks after hospital discharge. Please call with any questions or concerns including fevers, worsening pain, pus from the wounds, redness of the skin and difficulty breathing or heaviness in the chest at [phone].       PRINCIPAL PROCEDURE  Procedure: Hemiarthroplasty of right hip  Findings and Treatment: 64 y/o Female with PMH of MS who is currently bedbound who presented to [facility] with right hip pain and was found to have a right femoral neck fracture. Patient s/p right hemiarthroplasty with Dr. Fitzpatrick on 2/3/2023. Patient tolerated the procedure well without any intraoperative complications. Patient tolerated physical therapy well, and the pain was controlled. Patient is weight bearing as tolerated with cane/walker as needed, ANTERIOR HIP PRECAUTIONS. On POD#2 a rapid response was called for hypotension. At time of rapid response patient was hypoxic to 80%, Hypotensive with SBP in 50s, and FS 130s. Patient was placed on 5L via NC and O2 sat went to 99%. Patient received 2L bolus and SBP increased to . ABG showed hypercapnia pCO2 to 47 and CXR showed cleared lungs. CTA performed to rule out PE which was negative. Patient still experiencing dyspnea and transitioned to SICU for hemodynamic monitoring. While in SICU patient was started on albumin for hypotension and started on stress dose steroids for adrenal insufficiency. Pt was then downgraded to floor, received 1 unit packed red blood cells on 2/9. Seen by medical attending for continuity of care and management and cleared for safe discharge. Keep dressing/incision clean, dry and intact. Any suture/staples to be removed on post-op day #14 your office visit. Patient is on Eliquis for DVT prophylaxis, please take for 6 weeks unless otherwise instructed by your surgeon. Please follow up with Dr. Fitzpatrick in 2 weeks, call the office to make an appointment, [phone]. Please follow up with your PMD for continuity of care and management as medications may have changed.

## 2023-02-03 NOTE — PROGRESS NOTE ADULT - SUBJECTIVE AND OBJECTIVE BOX
Orthopedic Progress Note     S:  No acute events overnight, pain is well controlled.  Patient denies any chest pain, SOB, N/V, fevers/chills.    T(C): 36.6 (02-03-23 @ 03:47), Max: 37 (02-02-23 @ 15:08)  HR: 89 (02-03-23 @ 03:47) (75 - 89)  BP: 121/71 (02-03-23 @ 03:47) (105/59 - 129/74)  RR: 18 (02-03-23 @ 03:47) (16 - 18)  SpO2: 94% (02-03-23 @ 03:47) (94% - 100%)  Wt(kg): --I&O's Summary      O:  Physical exam:  Gen: Laying in bed, alert and oriented, NAD  Resp: Unlabored breathing  RLE: Skin intact, no ecchymosis,   SILT DP/SP/ Pippa/Saph/Post Tib  hip ROM limited 2/2 pain,  DP+,   soft compartments, no calf ttp,   +log roll.  +foot drop that is her baseline  wiggling toes               Labs:                        11.3   2.00  )-----------( 135      ( 03 Feb 2023 04:30 )             37.2    02-03    142  |  105  |  10  ----------------------------<  89  3.3<L>   |  24  |  <0.20<L>    Ca    8.9      03 Feb 2023 04:30    TPro  x   /  Alb  4.0  /  TBili  x   /  DBili  x   /  AST  x   /  ALT  x   /  AlkPhos  x   02-02

## 2023-02-03 NOTE — DISCHARGE NOTE PROVIDER - NSDCMRMEDTOKEN_GEN_ALL_CORE_FT
Adderall 10 mg oral tablet: 1 tab(s) orally once a day  baclofen 5 mg oral tablet: 1 tab(s) orally 3 times a day as needed for muscle spasm  gabapentin 300 mg oral capsule: 1 cap(s) orally 2 times a day  gabapentin 300 mg oral capsule: 1 cap(s) orally 3 times a day  lamotrigine 100 mg oral tablet: 1 tab(s) orally 2 times a day  Lexapro 20 mg oral tablet: 1 tab(s) orally once a day  Lipitor 20 mg oral tablet: 1 tab(s) orally once a day  modafinil 200 mg oral tablet: 1 tab(s) orally once a day (in the morning)  modafinil 200 mg oral tablet: 1 tab(s) orally once a day (in the morning)  SEROquel 50 mg oral tablet: 1 tab(s) orally once a day (at bedtime)  traZODone 100 mg oral tablet: 1 tab(s) orally once a day (at bedtime)  Vumerity 231 mg oral delayed release capsule: 2 tab(s) orally 2 times a day  Xanax 0.5 mg oral tablet: 1 tab(s) orally 2 times a day as needed   acetaminophen 325 mg oral tablet: 3 tab(s) orally every 6 hours  Adderall 10 mg oral tablet: 1 tab(s) orally once a day  baclofen 5 mg oral tablet: 1 tab(s) orally 3 times a day as needed for muscle spasm  lamotrigine 100 mg oral tablet: 1 tab(s) orally 2 times a day  Lexapro 20 mg oral tablet: 1 tab(s) orally once a day  Lipitor 20 mg oral tablet: 1 tab(s) orally once a day  modafinil 200 mg oral tablet: 1 tab(s) orally once a day (in the morning)  modafinil 200 mg oral tablet: 1 tab(s) orally once a day (in the morning)  SEROquel 50 mg oral tablet: 1 tab(s) orally once a day (at bedtime)  traZODone 100 mg oral tablet: 1 tab(s) orally once a day (at bedtime)  Xanax 0.5 mg oral tablet: 1 tab(s) orally 2 times a day as needed   acetaminophen 325 mg oral tablet: 3 tab(s) orally every 6 hours  Adderall 10 mg oral tablet: 1 tab(s) orally once a day  apixaban 2.5 mg oral tablet: 1 tab(s) orally every 12 hours  baclofen 5 mg oral tablet: 1 tab(s) orally 3 times a day as needed for muscle spasm  lamotrigine 100 mg oral tablet: 1 tab(s) orally 2 times a day  Lexapro 20 mg oral tablet: 1 tab(s) orally once a day  Lipitor 20 mg oral tablet: 1 tab(s) orally once a day  modafinil 200 mg oral tablet: 1 tab(s) orally once a day (in the morning)  modafinil 200 mg oral tablet: 1 tab(s) orally once a day (in the morning)  naloxone 4 mg/0.1 mL nasal spray: 4 milligram(s) intranasally once MDD:1 time  oxyCODONE 5 mg oral tablet: 1 tab(s) orally every 4 hours, as needed for Moderate to Severe Pain     Wean off as tolerated MDD:6 tablets  polyethylene glycol 3350 oral powder for reconstitution: 17 gram(s) orally once a day MDD:17 grams  senna leaf extract oral tablet: 2 tab(s) orally once a day (at bedtime)  SEROquel 50 mg oral tablet: 1 tab(s) orally once a day (at bedtime)  traMADol 50 mg oral tablet: 1 tab(s) orally every 8 hours   Wean off as tolerated MDD:3 tabs  traZODone 100 mg oral tablet: 1 tab(s) orally once a day (at bedtime)  Xanax 0.5 mg oral tablet: 1 tab(s) orally 2 times a day as needed

## 2023-02-03 NOTE — CONSULT NOTE ADULT - PROBLEM SELECTOR RECOMMENDATION 9
-r. femoral neck fracture  -unable to calculate mets, RCRI 0, EKG nonischemic  -needs cardiology input since pt is bedbound, unable to calculate ischemic risk and exercise capacity  -tylenol/oxycodone for pain  -hold modafinil and stimulants as these can cause bone loss, neurology consult in daytime can help address this w/ her MS -r. femoral neck fracture  -unable to calculate mets, RCRI 0, EKG nonischemic  -needs cardiology input since pt is bedbound, unable to calculate ischemic risk and exercise capacity  -can obtain TTE to rule out structural issues  -tylenol/oxycodone for pain  -hold modafinil and stimulants as these can cause bone loss, neurology consult in daytime can help address this w/ her MS

## 2023-02-03 NOTE — PROGRESS NOTE ADULT - ASSESSMENT
A/P: Patient is a 63y y/o Female s/p R hemiarthroplasty, POD #0   - Pain control  - Antibiotics - Ancef postop  - DVT ppx  - Eliquis  - Incentive spirometry  - Venodynes  - F/U AM Labs  - PT/OT/WBAT  - Notify Orthopedics with any questions

## 2023-02-03 NOTE — CONSULT NOTE ADULT - SUBJECTIVE AND OBJECTIVE BOX
Patient seen and evaluated at bedside    Chief Complaint:    HPI:  63yFemale with PMH of MS who is currently bedbound coming in with R hip pain after being turned in bed earlier today by an aide. Patient denies any falls, head hit, or LOC. Patient has numbness in RLE that is her baseline from MS. Patient denies any other injuries.    64 yo F w/ PMH Trigeminal Neuralgia, MS who presents 2/2 R hip pain found to have R femoral neck fracture. Patient noted since December, after her aide turned the patient on her side, she had notable R hip pain. Has been having constant R hip pain and was eventually brought to the hospital for evaluation and found to have a R femoral neck fracture. At baseline, she is wheelchair bound, cannot move her R arm and can move her left arm slightly above chest level. She denies any current chest pain, SOB, palpitations, lightheadedness, dizziness. Used to smoke but quit 10 years ago. Denies any drinking or drug use. Denies family hx of cardiac disease. Denies any cardiac hx.     PMHx:   Multiple Sclerosis    Neuralgia    Depression        PSHx:   No significant past surgical history        Allergies:  No Known Allergies      Home Meds:    Current Medications:   acetaminophen     Tablet .. 975 milliGRAM(s) Oral every 8 hours  ALPRAZolam 0.5 milliGRAM(s) Oral every 12 hours PRN  baclofen 5 milliGRAM(s) Oral every 8 hours PRN  escitalopram 20 milliGRAM(s) Oral daily  gabapentin 300 milliGRAM(s) Oral every 8 hours  lamoTRIgine 100 milliGRAM(s) Oral every 12 hours  magnesium hydroxide Suspension 30 milliLiter(s) Oral daily PRN  melatonin 3 milliGRAM(s) Oral at bedtime PRN  oxyCODONE    IR 2.5 milliGRAM(s) Oral every 4 hours PRN  oxyCODONE    IR 5 milliGRAM(s) Oral every 4 hours PRN  potassium chloride    Tablet ER 40 milliEquivalent(s) Oral every 4 hours  QUEtiapine 50 milliGRAM(s) Oral at bedtime  senna 2 Tablet(s) Oral at bedtime  sodium chloride 0.9%. 1000 milliLiter(s) IV Continuous <Continuous>  traZODone 100 milliGRAM(s) Oral at bedtime      FAMILY HISTORY:  Family history of hypertension (Father, Mother)    Family history of hypercholesterolemia (Father)    Family history of breast cancer (Mother)        Social History:  Smoking History:  Alcohol Use:  Drug Use:    REVIEW OF SYSTEMS:    Physical Exam:  T(F): 97.8 (02-03), Max: 98.6 (02-02)  HR: 89 (02-03) (75 - 89)  BP: 121/71 (02-03) (105/59 - 129/74)  RR: 18 (02-03)  SpO2: 94% (02-03)  GENERAL: No acute distress   CHEST/LUNG: Clear to auscultation bilaterally; No wheeze, equal breath sounds bilaterally   HEART: Regular rate and rhythm; No murmurs, rubs, or gallops  EXTREMITIES:  No edema noted   PSYCH: Nl behavior, nl affect  NEUROLOGY: AAOx3   SKIN: Normal color, No rashes or lesions  LINES:    Cardiovascular Diagnostic Testing:    ECG:   NSR, poor R wave progression     Echo:   CONCLUSIONS:  1. Mitral annular calcification, otherwise normal mitral  valve. Minimal mitral regurgitation.  2. Calcified trileaflet aortic valve with normal opening.  Mild aortic regurgitation.  3. Normal left ventricular internal dimensions and wall  thicknesses.  4. Normal left ventricular systolic function. No segmental  wall motion abnormalities.  5. Normal left ventricular diastolic function.  6. Normal right ventricular size and function.      Stress Testing:    Cath:    Imaging:    CXR:     Labs: Personally reviewed                        11.3   2.00  )-----------( 135      ( 03 Feb 2023 04:30 )             37.2     02-03    142  |  105  |  10  ----------------------------<  89  3.3<L>   |  24  |  <0.20<L>    Ca    8.9      03 Feb 2023 04:30    TPro  x   /  Alb  4.0  /  TBili  x   /  DBili  x   /  AST  x   /  ALT  x   /  AlkPhos  x   02-02    PT/INR - ( 03 Feb 2023 04:30 )   PT: 11.0 sec;   INR: 0.95 ratio         PTT - ( 03 Feb 2023 04:30 )  PTT:38.0 sec

## 2023-02-03 NOTE — PROGRESS NOTE ADULT - SUBJECTIVE AND OBJECTIVE BOX
Orthopedics Post-Op Check:  Patient was seen and examined at bedside. Denies CP/SOB/Dizziness, N/V/D, HA. Pain is controlled.     Vital Signs Last 24 Hrs  T(C): 36.2 (03 Feb 2023 15:10), Max: 36.9 (03 Feb 2023 11:30)  T(F): 97.2 (03 Feb 2023 15:10), Max: 98.4 (03 Feb 2023 11:30)  HR: 102 (03 Feb 2023 16:15) (75 - 108)  BP: 123/83 (03 Feb 2023 16:15) (117/70 - 136/70)  BP(mean): 93 (03 Feb 2023 16:15) (80 - 94)  RR: 19 (03 Feb 2023 16:15) (15 - 22)  SpO2: 89% (03 Feb 2023 16:15) (89% - 100%)    Parameters below as of 03 Feb 2023 15:10  Patient On (Oxygen Delivery Method): nasal cannula  O2 Flow (L/min): 3    Labs:                        11.3   2.00  )-----------( 135      ( 03 Feb 2023 04:30 )             37.2     02-03    143  |  107  |  10  ----------------------------<  90  4.4   |  24  |  0.20<L>    Ca    8.9      03 Feb 2023 10:30    TPro  x   /  Alb  4.0  /  TBili  x   /  DBili  x   /  AST  x   /  ALT  x   /  AlkPhos  x   02-02    Physical Exam:  Gen: NAD, A&Ox3   R LE:   Dressing C/D/I  Able to wiggle toes. Right foot drop. Sensation is grossly intact.   Compartments are soft, extremities are warm, DP 2+

## 2023-02-03 NOTE — CONSULT NOTE ADULT - PROBLEM SELECTOR RECOMMENDATION 4
-c/w xanax 0.5 mg BID as needed for anxiety  -c/w lexapro 20 mg  -trazodone 100 mg at bedtime and seroquel 50 mg bedtime as well w/ hold parameters for sedation while on pain medications.

## 2023-02-03 NOTE — CHART NOTE - NSCHARTNOTEFT_GEN_A_CORE
Search Terms: chary pagan, 1960Search Date: 02/03/2023 00:12:06 AM  The Drug Utilization Report below displays all of the controlled substance prescriptions, if any, that your patient has filled in the last twelve months. The information displayed on this report is compiled from pharmacy submissions to the Department, and accurately reflects the information as submitted by the pharmacies.    This report was requested by: Sanya Rojas | Reference #: [id number]    Others' Prescriptions  Patient Name: Chary Rivas Date: 1960  Address: [address] [place] [id number]: Female  Rx Written	Rx Dispensed	Drug	Quantity	Days Supply	Prescriber Name	Prescriber Day #	Payment Method	Dispenser  01/24/2023	01/30/2023	alprazolam 0.5 mg tablet	90	30	Ganzer, Clarisse	[id number]	Medicare	[facility] #[id number]  11/21/2022	11/26/2022	alprazolam 0.5 mg tablet	90	30	Ganzer, Clarisse	[id number]	Medicare	[facility] #[id number]  11/21/2022	11/26/2022	dextroamp-amphetamin 20 mg tab	60	30	GanClarisse wagner	[id number]	Medicare	[facility] #[id number]  10/05/2022	10/12/2022	dextroamp-amphet er 20 mg cap	30	30	DavidaViral elizondo MD	[id number]	Medicare	[facility] #[id number]  09/12/2022	09/13/2022	alprazolam 0.5 mg tablet	90	30	Ganzer, Clarisse	[id number]	Medicare	[facility] #[id number]  08/30/2022	08/31/2022	dextroamp-amphetamin 10 mg tab	60	30	Viral Higuera MD	[id number]	Medicare	[facility] #[id number]  08/09/2022	08/10/2022	modafinil 200 mg tablet	60	30	GanzerClarisse	[id number]	Medicare	[facility] #[id number]  08/09/2022	08/10/2022	alprazolam 0.5 mg tablet	90	30	Ganzer, Clarisse Harmon	[id number]	Medicare	[facility] #[id number]  06/23/2022	06/24/2022	modafinil 200 mg tablet	60	30	Ganzer, Clarisse Harmon	[id number]	Medicare	[facility] #[id number]  06/23/2022	06/24/2022	alprazolam 0.5 mg tablet	90	30	Ganzer, Clarisse Harmon	[id number]	Medicare	[facility] #[id number]  04/28/2022	05/04/2022	alprazolam 0.5 mg tablet	90	30	Ganzer, Clarisse Harmon	[id number]	Medicare	[facility] #[id number]  04/28/2022	04/30/2022	modafinil 200 mg tablet	60	30	Ganzer, Clarisse Harmon	[id number]	Medicare	[facility] #[id number]  03/28/2022	04/02/2022	alprazolam 0.5 mg tablet	90	30	Ganzer, Clarisse Harmon	[id number]	Medicare	[facility] #[id number]  03/28/2022	04/02/2022	modafinil 200 mg tablet	60	30	Ganzer, Clarisse Harmon	[id number]	Medicare	[facility] #[id number]  02/23/2022	02/24/2022	alprazolam 0.5 mg tablet	90	30	Ganzer, Clarisse Harmon	[id number]	Medicare	[facility] #[id number]  02/23/2022	02/24/2022	modafinil 200 mg tablet	60	30	Ganzer, Clarisse Harmon	[id number]	Medicare	[facility] #[id number]  * - Drugs marked with an asterisk are compound drugs. If the compound drug is made up of more than one controlled substance, then each controlled substance will be a separate row in the table.

## 2023-02-03 NOTE — PROGRESS NOTE ADULT - SUBJECTIVE AND OBJECTIVE BOX
ORTHO ATTENDING POST OP    s/p R  hip hemiarthroplasty  WBAT R  LE  Anterior hip precautions  Eliquis  yamilex  ancef x 24h  OOB to chair in AM  rehab consult  CBC in RR and AM   f/u medicine

## 2023-02-03 NOTE — DISCHARGE NOTE PROVIDER - CARE PROVIDER_API CALL
Damon Fitzpatrick (MD)  Orthopaedic Surgery  [address]  [place] [address]  Phone: [phone]  Fax: [phone]  Follow Up Time:

## 2023-02-03 NOTE — CHART NOTE - NSCHARTNOTEFT_GEN_A_CORE
Orthopedics planning to take patient to OR 2/3 for right hip hemiarthroplasty. Per Cardiology Fellow's discussion with attending, patient can proceed to OR without ischemic workup, risk stratification in Cardiology Consult note. Please notify Orthopedics with any further questions.

## 2023-02-03 NOTE — DISCHARGE NOTE PROVIDER - NSDCCPCAREPLAN_GEN_ALL_CORE_FT
PRINCIPAL DISCHARGE DIAGNOSIS  Diagnosis: Femur neck fracture  Assessment and Plan of Treatment: Diet: Continue regular diet upon discharge.   Activity: No heavy lifting > 25 lbs for 4 weeks. Avoid straining or excessive activity x 6 weeks.   -Continue to use your walker when ambulating until your postoperative follow up appointment.   Anterior Hip Precautions:  -DO NOT step backwards with surgical leg. No hip extension.  -DO NOT allow surgical leg to externally rotate (turn outwards).  -DO NOT cross your legs. Use a pillow between legs when rolling.  -DO NOT lie on stomach.  Dressings: Keep dressing clean, dry, and intact. Your doctor will remove your bandage at your post-operative follow up appointment.   Other Care:   -You may shower when you get home but DO NOT soak dressing and/or incision. The water may run over your dressing/incision but DO NOT let the water directly hit your dressing/incision (take a shower with your wound away from the direct stream of water). NO hot tubes, NO bath rubs, NO swimming pools.   -Elevate your operative leg 2 feet above heart level for 2 hours in the morning, 2 hours in the afternoon, and 2 hours in the evening.   -Apply ice for 20min every time you elevate.   -Sit for 90 min/day: 45mins x2 or 30min x3  -DO NOT sit for more than the 90min/day. Walk or lay down when not elevating your leg.   -DO NOT place the elevation pillow behind your knees. Only place it under your calf and heel.   -DO NOT bend more than 45 degrees at the waist         PRINCIPAL DISCHARGE DIAGNOSIS  Diagnosis: Femur neck fracture  Assessment and Plan of Treatment: Anterior Hip Precautions:  -DO NOT step backwards with surgical leg. No hip extension.  -DO NOT allow surgical leg to externally rotate (turn outwards).  -DO NOT cross your legs. Use a pillow between legs when rolling.  -DO NOT lie on stomach.  Dressings: Keep dressing clean, dry, and intact. Your doctor will remove your bandage at your post-operative follow up appointment.

## 2023-02-03 NOTE — DISCHARGE NOTE PROVIDER - HOSPITAL COURSE
This is a 62yo Female with PMH of MS who is currently bedbound who presented to [facility] with right hip pain and was found to have a right femoral neck fracture. Patient s/p right hemiarthroplasty with Dr. Fitzpatrick on 2/3/2023. Patient tolerated the procedure well without any intraoperative complications. Patient tolerated physical therapy well, and the pain was controlled. Patient is weight bearing as tolerated with cane/walker as needed, ANTERIOR HIP PRECAUTIONS. Seen by medical attending for continuity of care and management and cleared for safe discharge. Keep dressing/incision clean, dry and intact. Any suture/staples to be removed on post-op day #14 your office visit. Patient is on Eliquis for DVT prophylaxis, please take for 6 weeks unless otherwise instructed by your surgeon. Please follow up with Dr. Fitzpatrick in 2 weeks, call the office to make an appointment, [phone]. Please follow up with your PMD for continuity of care and management as medications may have changed.   This is a 64yo Female with PMH of MS who is currently bedbound who presented to [facility] with right hip pain and was found to have a right femoral neck fracture. Patient s/p right hemiarthroplasty with Dr. Fitzpatrick on 2/3/2023. Patient tolerated the procedure well without any intraoperative complications. Patient tolerated physical therapy well, and the pain was controlled. Patient is weight bearing as tolerated with cane/walker as needed, ANTERIOR HIP PRECAUTIONS.     On POD#2 a rapid response was called for hypotension. At time of rapid response patient was hypoxic to 80%, hypotensive with SBP in 50s, and FS 130s. Patient was placed on 5L via NC and O2 sat went to 99%. Patient received 2L bolus and SBP increased to . ABG showed hypercapnia pCO2 to 47 and CXR showed cleared lungs. CTA performed to rule out PE which was negative. Patient still experiencing dyspnea and transitioned to SICU for hemodynamic monitoring. While in SICU patient was started on albumin for hypotension and started on stress dose steroids for adrenal insufficiency.    **************    Seen by medical attending for continuity of care and management and cleared for safe discharge. Keep dressing/incision clean, dry and intact. Any suture/staples to be removed on post-op day #14 your office visit. Patient is on Eliquis for DVT prophylaxis, please take for 6 weeks unless otherwise instructed by your surgeon. Please follow up with Dr. Fitzpatrick in 2 weeks, call the office to make an appointment, [phone]. Please follow up with your PMD for continuity of care and management as medications may have changed.   62 y/o Female with PMH of MS who is currently bedbound who presented to [facility] with right hip pain and was found to have a right femoral neck fracture. Patient s/p right hemiarthroplasty with Dr. Fitzpatrick on 2/3/2023. Patient tolerated the procedure well without any intraoperative complications. Patient tolerated physical therapy well, and the pain was controlled. Patient is weight bearing as tolerated with cane/walker as needed, ANTERIOR HIP PRECAUTIONS. On POD#2 a rapid response was called for hypotension. At time of rapid response patient was hypoxic to 80%, Hypotensive with SBP in 50s, and FS 130s. Patient was placed on 5L via NC and O2 sat went to 99%. Patient received 2L bolus and SBP increased to . ABG showed hypercapnia pCO2 to 47 and CXR showed cleared lungs. CTA performed to rule out PE which was negative. Patient still experiencing dyspnea and transitioned to SICU for hemodynamic monitoring. While in SICU patient was started on albumin for hypotension and started on stress dose steroids for adrenal insufficiency. Pt was then downgraded to floor, received 1 unit packed red blood cells on 2/9. Seen by medical attending for continuity of care and management and cleared for safe discharge. Keep dressing/incision clean, dry and intact. Any suture/staples to be removed on post-op day #14 your office visit. Patient is on Eliquis for DVT prophylaxis, please take for 6 weeks unless otherwise instructed by your surgeon. Please follow up with Dr. Fitzpatrick in 2 weeks, call the office to make an appointment, [phone]. Please follow up with your PMD for continuity of care and management as medications may have changed.   62 y/o Female with PMH of MS who is currently bedbound who presented to [facility] with right hip pain and was found to have a right femoral neck fracture. Patient s/p right hemiarthroplasty with Dr. Fitzpatrick on 2/3/2023. Patient tolerated the procedure well without any intraoperative complications. Patient tolerated physical therapy well, and the pain was controlled. Patient is weight bearing as tolerated with cane/walker as needed, ANTERIOR HIP PRECAUTIONS. On POD#2 a rapid response was called for hypotension. At time of rapid response patient was hypoxic to 80%, Hypotensive with SBP in 50s, and FS 130s. Patient was placed on 5L via NC and O2 sat went to 99%. Patient received 2L bolus and SBP increased to . ABG showed hypercapnia pCO2 to 47 and CXR showed cleared lungs. CTA performed to rule out PE which was negative. Patient still experiencing dyspnea and transitioned to SICU for hemodynamic monitoring. While in SICU patient was started on albumin for hypotension and started on stress dose steroids for adrenal insufficiency. Pt was then downgraded to floor, received 1 unit packed red blood cells on 2/9. Hematology Oncology saw patient for low white count, recommended holding Vumerity (multiple sclerosis home medication). Seen by medical attending for continuity of care and management and cleared for safe discharge. Keep dressing/incision clean, dry and intact. Any suture/staples to be removed on post-op day #14 your office visit. Patient is on Eliquis for DVT prophylaxis, please take for 6 weeks unless otherwise instructed by your surgeon. Please follow up with Dr. Fitzpatrick in 2 weeks, call the office to make an appointment, [phone]. Please follow up with your PMD for continuity of care and management as medications may have changed.

## 2023-02-04 DIAGNOSIS — D62 ACUTE POSTHEMORRHAGIC ANEMIA: ICD-10-CM

## 2023-02-04 LAB
ANION GAP SERPL CALC-SCNC: 7 MMOL/L — SIGNIFICANT CHANGE UP (ref 7–14)
ANISOCYTOSIS BLD QL: SLIGHT — SIGNIFICANT CHANGE UP
BASOPHILS # BLD AUTO: 0 K/UL — SIGNIFICANT CHANGE UP (ref 0–0.2)
BASOPHILS NFR BLD AUTO: 0 % — SIGNIFICANT CHANGE UP (ref 0–2)
BUN SERPL-MCNC: 6 MG/DL — LOW (ref 7–23)
CALCIUM SERPL-MCNC: 8.2 MG/DL — LOW (ref 8.4–10.5)
CHLORIDE SERPL-SCNC: 106 MMOL/L — SIGNIFICANT CHANGE UP (ref 98–107)
CO2 SERPL-SCNC: 27 MMOL/L — SIGNIFICANT CHANGE UP (ref 22–31)
CREAT SERPL-MCNC: 0.22 MG/DL — LOW (ref 0.5–1.3)
EGFR: 129 ML/MIN/1.73M2 — SIGNIFICANT CHANGE UP
EOSINOPHIL # BLD AUTO: 0 K/UL — SIGNIFICANT CHANGE UP (ref 0–0.5)
EOSINOPHIL NFR BLD AUTO: 0 % — SIGNIFICANT CHANGE UP (ref 0–6)
GIANT PLATELETS BLD QL SMEAR: PRESENT — SIGNIFICANT CHANGE UP
GLUCOSE SERPL-MCNC: 137 MG/DL — HIGH (ref 70–99)
HCT VFR BLD CALC: 31.3 % — LOW (ref 34.5–45)
HGB BLD-MCNC: 10.4 G/DL — LOW (ref 11.5–15.5)
IANC: 1.57 K/UL — LOW (ref 1.8–7.4)
LYMPHOCYTES # BLD AUTO: 1.15 K/UL — SIGNIFICANT CHANGE UP (ref 1–3.3)
LYMPHOCYTES # BLD AUTO: 30.7 % — SIGNIFICANT CHANGE UP (ref 13–44)
MACROCYTES BLD QL: SLIGHT — SIGNIFICANT CHANGE UP
MCHC RBC-ENTMCNC: 29.6 PG — SIGNIFICANT CHANGE UP (ref 27–34)
MCHC RBC-ENTMCNC: 33.2 GM/DL — SIGNIFICANT CHANGE UP (ref 32–36)
MCV RBC AUTO: 89.2 FL — SIGNIFICANT CHANGE UP (ref 80–100)
MONOCYTES # BLD AUTO: 0.62 K/UL — SIGNIFICANT CHANGE UP (ref 0–0.9)
MONOCYTES NFR BLD AUTO: 16.7 % — HIGH (ref 2–14)
NEUTROPHILS # BLD AUTO: 1.84 K/UL — SIGNIFICANT CHANGE UP (ref 1.8–7.4)
NEUTROPHILS NFR BLD AUTO: 49.1 % — SIGNIFICANT CHANGE UP (ref 43–77)
PLAT MORPH BLD: ABNORMAL
PLATELET # BLD AUTO: 124 K/UL — LOW (ref 150–400)
PLATELET COUNT - ESTIMATE: ABNORMAL
POLYCHROMASIA BLD QL SMEAR: SLIGHT — SIGNIFICANT CHANGE UP
POTASSIUM SERPL-MCNC: 4 MMOL/L — SIGNIFICANT CHANGE UP (ref 3.5–5.3)
POTASSIUM SERPL-SCNC: 4 MMOL/L — SIGNIFICANT CHANGE UP (ref 3.5–5.3)
RBC # BLD: 3.51 M/UL — LOW (ref 3.8–5.2)
RBC # FLD: 15.9 % — HIGH (ref 10.3–14.5)
RBC BLD AUTO: ABNORMAL
SODIUM SERPL-SCNC: 140 MMOL/L — SIGNIFICANT CHANGE UP (ref 135–145)
VARIANT LYMPHS # BLD: 3.5 % — SIGNIFICANT CHANGE UP (ref 0–6)
WBC # BLD: 3.74 K/UL — LOW (ref 3.8–10.5)
WBC # FLD AUTO: 3.74 K/UL — LOW (ref 3.8–10.5)

## 2023-02-04 PROCEDURE — 99233 SBSQ HOSP IP/OBS HIGH 50: CPT

## 2023-02-04 RX ORDER — SODIUM CHLORIDE 9 MG/ML
250 INJECTION, SOLUTION INTRAVENOUS ONCE
Refills: 0 | Status: COMPLETED | OUTPATIENT
Start: 2023-02-04

## 2023-02-04 RX ORDER — SODIUM CHLORIDE 9 MG/ML
1000 INJECTION, SOLUTION INTRAVENOUS ONCE
Refills: 0 | Status: COMPLETED | OUTPATIENT
Start: 2023-02-04 | End: 2023-02-04

## 2023-02-04 RX ORDER — SODIUM CHLORIDE 9 MG/ML
250 INJECTION, SOLUTION INTRAVENOUS ONCE
Refills: 0 | Status: COMPLETED | OUTPATIENT
Start: 2023-02-04 | End: 2023-02-04

## 2023-02-04 RX ADMIN — Medication 100 MILLIGRAM(S): at 23:35

## 2023-02-04 RX ADMIN — GABAPENTIN 300 MILLIGRAM(S): 400 CAPSULE ORAL at 05:52

## 2023-02-04 RX ADMIN — OXYCODONE HYDROCHLORIDE 5 MILLIGRAM(S): 5 TABLET ORAL at 18:37

## 2023-02-04 RX ADMIN — SODIUM CHLORIDE 1000 MILLILITER(S): 9 INJECTION, SOLUTION INTRAVENOUS at 16:02

## 2023-02-04 RX ADMIN — APIXABAN 2.5 MILLIGRAM(S): 2.5 TABLET, FILM COATED ORAL at 18:37

## 2023-02-04 RX ADMIN — Medication 5 MILLIGRAM(S): at 10:00

## 2023-02-04 RX ADMIN — SODIUM CHLORIDE 1000 MILLILITER(S): 9 INJECTION, SOLUTION INTRAVENOUS at 05:46

## 2023-02-04 RX ADMIN — Medication 5 MILLIGRAM(S): at 19:58

## 2023-02-04 RX ADMIN — APIXABAN 2.5 MILLIGRAM(S): 2.5 TABLET, FILM COATED ORAL at 06:15

## 2023-02-04 RX ADMIN — LAMOTRIGINE 100 MILLIGRAM(S): 25 TABLET, ORALLY DISINTEGRATING ORAL at 10:01

## 2023-02-04 RX ADMIN — OXYCODONE HYDROCHLORIDE 5 MILLIGRAM(S): 5 TABLET ORAL at 12:55

## 2023-02-04 RX ADMIN — Medication 975 MILLIGRAM(S): at 12:25

## 2023-02-04 RX ADMIN — GABAPENTIN 300 MILLIGRAM(S): 400 CAPSULE ORAL at 12:26

## 2023-02-04 RX ADMIN — Medication 975 MILLIGRAM(S): at 06:51

## 2023-02-04 RX ADMIN — GABAPENTIN 300 MILLIGRAM(S): 400 CAPSULE ORAL at 21:57

## 2023-02-04 RX ADMIN — Medication 975 MILLIGRAM(S): at 12:55

## 2023-02-04 RX ADMIN — OXYCODONE HYDROCHLORIDE 5 MILLIGRAM(S): 5 TABLET ORAL at 19:07

## 2023-02-04 RX ADMIN — ESCITALOPRAM OXALATE 20 MILLIGRAM(S): 10 TABLET, FILM COATED ORAL at 12:25

## 2023-02-04 RX ADMIN — Medication 100 MILLIGRAM(S): at 05:48

## 2023-02-04 RX ADMIN — Medication 975 MILLIGRAM(S): at 05:51

## 2023-02-04 RX ADMIN — SODIUM CHLORIDE 250 MILLILITER(S): 9 INJECTION, SOLUTION INTRAVENOUS at 10:00

## 2023-02-04 RX ADMIN — QUETIAPINE FUMARATE 50 MILLIGRAM(S): 200 TABLET, FILM COATED ORAL at 21:57

## 2023-02-04 RX ADMIN — Medication 100 MILLIGRAM(S): at 00:59

## 2023-02-04 RX ADMIN — LAMOTRIGINE 100 MILLIGRAM(S): 25 TABLET, ORALLY DISINTEGRATING ORAL at 21:57

## 2023-02-04 RX ADMIN — Medication 975 MILLIGRAM(S): at 21:58

## 2023-02-04 RX ADMIN — OXYCODONE HYDROCHLORIDE 5 MILLIGRAM(S): 5 TABLET ORAL at 12:24

## 2023-02-04 NOTE — PHYSICAL THERAPY INITIAL EVALUATION ADULT - PRECAUTIONS/LIMITATIONS, REHAB EVAL
(Anterior approach)/aspiration precautions/fall precautions/right hip precautions/surgical precautions

## 2023-02-04 NOTE — PHYSICAL THERAPY INITIAL EVALUATION ADULT - PATIENT PROFILE REVIEW, REHAB EVAL
Activity - ambulate with assistance. Pt cleared for PT evaluation prior to session by JULIEN Drake./yes

## 2023-02-04 NOTE — PROGRESS NOTE ADULT - ASSESSMENT
ASSESSMENT & PLAN:  Pt is a 63y y/o  Female   s/p Right Hemiarthroplasty on 2/2 . Patient is hemodynamically stable; recovering well from orthopaedic standpoint.  -    Multimodal Pain control  -    DVT ppx: ASA   -    Resumed home meds  -    Check AM labs  -    Weight bearing status: WBAT   -    PT/OT  -    Dispo:  TBD      Orthopaedic Surgery  [employer] [facility] [employer] [id number]  [facility]        [id number]  [facility]  [id number]/ [phone]

## 2023-02-04 NOTE — PROGRESS NOTE ADULT - SUBJECTIVE AND OBJECTIVE BOX
SUBJECTIVE:   Patient seen and examined at bedside. Pt doing generally well.    Pain well controlled with medication    OBJECTIVE:   Vital Signs Last 24 Hrs  T(C): 37.1 (04 Feb 2023 09:16), Max: 37.1 (04 Feb 2023 09:16)  T(F): 98.7 (04 Feb 2023 09:16), Max: 98.7 (04 Feb 2023 09:16)  HR: 93 (04 Feb 2023 09:16) (84 - 108)  BP: 90/54 (04 Feb 2023 09:16) (90/54 - 136/70)  BP(mean): 82 (03 Feb 2023 17:15) (79 - 94)  RR: 17 (04 Feb 2023 09:16) (13 - 22)  SpO2: 93% (04 Feb 2023 09:16) (85% - 100%)    Parameters below as of 04 Feb 2023 09:16  Patient On (Oxygen Delivery Method): room air        General: NAD  Neuro: Alert  and oriented  Resp: Non-labored breathing, no accessory muscle use  Right Lower extremity:         Skin intact         Dressing: clean/dry/intact            Sensation: SILT         Motor exam: baseline        compartments soft         warm well perfused         LABS:                        10.4   3.74  )-----------( 124      ( 04 Feb 2023 05:00 )             31.3     02-04    140  |  106  |  6<L>  ----------------------------<  137<H>  4.0   |  27  |  0.22<L>    Ca    8.2<L>      04 Feb 2023 05:00    TPro  x   /  Alb  4.0  /  TBili  x   /  DBili  x   /  AST  x   /  ALT  x   /  AlkPhos  x   02-02    PT/INR - ( 03 Feb 2023 04:30 )   PT: 11.0 sec;   INR: 0.95 ratio         PTT - ( 03 Feb 2023 04:30 )  PTT:38.0 sec    MEDS:  MEDICATIONS  (PRN):  ALPRAZolam 0.5 milliGRAM(s) Oral every 12 hours PRN anxiety  baclofen 5 milliGRAM(s) Oral every 8 hours PRN Muscle Spasm  bisacodyl Suppository 10 milliGRAM(s) Rectal daily PRN If no bowel movement by POD#2  magnesium hydroxide Suspension 30 milliLiter(s) Oral daily PRN Constipation  melatonin 3 milliGRAM(s) Oral at bedtime PRN Insomnia  ondansetron Injectable 4 milliGRAM(s) IV Push every 6 hours PRN Nausea and/or Vomiting  oxyCODONE    IR 2.5 milliGRAM(s) Oral every 4 hours PRN Moderate Pain (4 - 6)  oxyCODONE    IR 5 milliGRAM(s) Oral every 4 hours PRN Severe Pain (7 - 10)    acetaminophen     Tablet .. 975 milliGRAM(s) Oral every 8 hours  acetaminophen     Tablet .. 975 milliGRAM(s) Oral every 8 hours  ALPRAZolam 0.5 milliGRAM(s) Oral every 12 hours PRN  apixaban 2.5 milliGRAM(s) Oral every 12 hours  baclofen 5 milliGRAM(s) Oral every 8 hours PRN  bisacodyl Suppository 10 milliGRAM(s) Rectal daily PRN  chlorhexidine 2% Cloths 1 Application(s) Topical once  escitalopram 20 milliGRAM(s) Oral daily  gabapentin 300 milliGRAM(s) Oral every 8 hours  lactated ringers Bolus 250 milliLiter(s) IV Bolus once  lamoTRIgine 100 milliGRAM(s) Oral every 12 hours  magnesium hydroxide Suspension 30 milliLiter(s) Oral daily PRN  melatonin 3 milliGRAM(s) Oral at bedtime PRN  ondansetron Injectable 4 milliGRAM(s) IV Push every 6 hours PRN  oxyCODONE    IR 2.5 milliGRAM(s) Oral every 4 hours PRN  oxyCODONE    IR 5 milliGRAM(s) Oral every 4 hours PRN  polyethylene glycol 3350 17 Gram(s) Oral daily  povidone iodine 5% Nasal Swab 1 Application(s) Both Nostrils once  QUEtiapine 50 milliGRAM(s) Oral at bedtime  senna 2 Tablet(s) Oral at bedtime  sodium chloride 0.9%. 1000 milliLiter(s) IV Continuous <Continuous>  traZODone 100 milliGRAM(s) Oral at bedtime  Vumerity 231mg DR capsule 2 Capsule(s) 2 Capsule(s) Oral two times a day

## 2023-02-04 NOTE — OCCUPATIONAL THERAPY INITIAL EVALUATION ADULT - DIAGNOSIS, OT EVAL
s/p right transcervical femoral neck fracture, s/p right hip hemiarthroplasty; decreased functional mobility, decreased ADL performance

## 2023-02-04 NOTE — OCCUPATIONAL THERAPY INITIAL EVALUATION ADULT - RANGE OF MOTION EXAMINATION, UPPER EXTREMITY
Right shoulder passive ROM grossly 0-30 degrees, elbow lacking ~70 degrees extension, wrist/digits WFL. Left UE active assisted ROM shoulder 0-80 degrees flexion, elbow/wrist/hand WFL

## 2023-02-04 NOTE — PHYSICAL THERAPY INITIAL EVALUATION ADULT - ADDITIONAL COMMENTS
Pt states she lives in a first floor apartment alone with ramp access. Pt reports she is primarily wheelchair and bedbound, dependent in ADLs, has 24/7 home health aide. Pt owns a Adela, wheelchair, hospital bed.    Following evaluation, pt was left semireclined in bed in no distress, call bell in reach.

## 2023-02-04 NOTE — PROGRESS NOTE ADULT - NUTRITIONAL ASSESSMENT
consult neurology in am to approve Vumerity patient has her medications at bedside  -c/w baclofen 5 mg TID prn muscle spasms.     Problem/Recommendation - 3:  ·  Problem: Trigeminal neuralgia.   ·  Recommendation: -     Problem/Recommendation - 4:  ·  Problem: Major depression.   ·  Recommendation: -   Problem/Recommendation - 5:  ·  Problem: Nutrition, metabolism, and development symptoms.   ·  Recommendation: F-none  E-replete prn  N-NPO for now  AC per orthopedic.

## 2023-02-04 NOTE — PROGRESS NOTE ADULT - SUBJECTIVE AND OBJECTIVE BOX
Patient is a 63y old  Female who presents with a chief complaint of s/p right hemiarthroplasty (03 Feb 2023 18:22)      SUBJECTIVE / OVERNIGHT EVENTS: patient seen and examined by bedside, pt s/p  Right Hemiarthroplasty on 2/3/23, tolerated procedure well, pain controlled, pt afebrile, no acute distress noted   .    MEDICATIONS  (STANDING):  acetaminophen     Tablet .. 975 milliGRAM(s) Oral every 8 hours  acetaminophen     Tablet .. 975 milliGRAM(s) Oral every 8 hours  apixaban 2.5 milliGRAM(s) Oral every 12 hours  chlorhexidine 2% Cloths 1 Application(s) Topical once  escitalopram 20 milliGRAM(s) Oral daily  gabapentin 300 milliGRAM(s) Oral every 8 hours  lamoTRIgine 100 milliGRAM(s) Oral every 12 hours  polyethylene glycol 3350 17 Gram(s) Oral daily  povidone iodine 5% Nasal Swab 1 Application(s) Both Nostrils once  QUEtiapine 50 milliGRAM(s) Oral at bedtime  senna 2 Tablet(s) Oral at bedtime  sodium chloride 0.9%. 1000 milliLiter(s) (100 mL/Hr) IV Continuous <Continuous>  traZODone 100 milliGRAM(s) Oral at bedtime  Vumerity 231mg DR capsule 2 Capsule(s) 2 Capsule(s) Oral two times a day    MEDICATIONS  (PRN):  ALPRAZolam 0.5 milliGRAM(s) Oral every 12 hours PRN anxiety  baclofen 5 milliGRAM(s) Oral every 8 hours PRN Muscle Spasm  bisacodyl Suppository 10 milliGRAM(s) Rectal daily PRN If no bowel movement by POD#2  magnesium hydroxide Suspension 30 milliLiter(s) Oral daily PRN Constipation  melatonin 3 milliGRAM(s) Oral at bedtime PRN Insomnia  ondansetron Injectable 4 milliGRAM(s) IV Push every 6 hours PRN Nausea and/or Vomiting  oxyCODONE    IR 2.5 milliGRAM(s) Oral every 4 hours PRN Moderate Pain (4 - 6)  oxyCODONE    IR 5 milliGRAM(s) Oral every 4 hours PRN Severe Pain (7 - 10)      Vital Signs Last 24 Hrs  T(C): 36.6 (04 Feb 2023 14:30), Max: 37.1 (04 Feb 2023 09:16)  T(F): 97.9 (04 Feb 2023 14:30), Max: 98.7 (04 Feb 2023 09:16)  HR: 109 (04 Feb 2023 14:30) (93 - 109)  BP: 100/40 (04 Feb 2023 14:30) (90/54 - 118/53)  BP(mean): --  RR: 17 (04 Feb 2023 14:30) (16 - 18)  SpO2: 96% (04 Feb 2023 14:30) (93% - 96%)    Parameters below as of 04 Feb 2023 14:30  Patient On (Oxygen Delivery Method): room air      CAPILLARY BLOOD GLUCOSE        I&O's Summary    03 Feb 2023 07:01  -  04 Feb 2023 07:00  --------------------------------------------------------  IN: 740 mL / OUT: 1450 mL / NET: -710 mL    04 Feb 2023 07:01  -  04 Feb 2023 17:39  --------------------------------------------------------  IN: 0 mL / OUT: 1500 mL / NET: -1500 mL    PHYSICAL EXAM:  GENERAL: NAD, well-developed  HEAD:  Atraumatic, Normocephalic  EYES: EOMI, PERRLA, conjunctiva and sclera clear  CHEST/LUNG: Clear to auscultation bilaterally; No wheeze  HEART: Regular rate and rhythm;   ABDOMEN: Soft, Nontender, Nondistended; Bowel sounds present  EXTREMITIES:  2+ Peripheral Pulses, rt hip with dressing C/D/I   PSYCH: AAOx3  NEUROLOGY: 3+/5 UE and 1/5 LE strength, sensation intact  SKIN: sacral ulcer noted no drainage or purulence      LABS:                        10.4   3.74  )-----------( 124      ( 04 Feb 2023 05:00 )             31.3     02-04    140  |  106  |  6<L>  ----------------------------<  137<H>  4.0   |  27  |  0.22<L>    Ca    8.2<L>      04 Feb 2023 05:00    TPro  x   /  Alb  4.0  /  TBili  x   /  DBili  x   /  AST  x   /  ALT  x   /  AlkPhos  x   02-02    PT/INR - ( 03 Feb 2023 04:30 )   PT: 11.0 sec;   INR: 0.95 ratio         PTT - ( 03 Feb 2023 04:30 )  PTT:38.0 sec          RADIOLOGY & ADDITIONAL TESTS:    Imaging Personally Reviewed:    Consultant(s) Notes Reviewed:  ortho     Care Discussed with Consultants/Other Providers:

## 2023-02-04 NOTE — PHYSICAL THERAPY INITIAL EVALUATION ADULT - PASSIVE RANGE OF MOTION EXAMINATION, REHAB EVAL
bilateral upper extremity Passive ROM was WFL (within functional limits)/bilateral lower extremity Passive ROM was WFL (within functional limits) Right LE tested within precautions/bilateral upper extremity Passive ROM was WFL (within functional limits)/bilateral lower extremity Passive ROM was WFL (within functional limits)

## 2023-02-04 NOTE — PROGRESS NOTE ADULT - ASSESSMENT
63 F MS bedbound, depression who presents w/ visiting NP noticed patient was home alone w/o 24/7 aid.

## 2023-02-04 NOTE — OCCUPATIONAL THERAPY INITIAL EVALUATION ADULT - PERTINENT HX OF CURRENT PROBLEM, REHAB EVAL
63 year old female with history of MS who is currently bedbound coming in with right hip pain. Xray of right hip demonstrating with right transcervical femoral neck fracture now s/p right hip hemiarthroplasty on 2/3/23.

## 2023-02-04 NOTE — OCCUPATIONAL THERAPY INITIAL EVALUATION ADULT - ADDITIONAL COMMENTS
Patient is dependent for all ADLs. Has 24/7 home health aide. Uses a Adela lift to transfer from bed to wheelchair (has not done this since November). Has a hospital bed.

## 2023-02-05 LAB
ANION GAP SERPL CALC-SCNC: 4 MMOL/L — LOW (ref 7–14)
APTT BLD: 34.7 SEC — SIGNIFICANT CHANGE UP (ref 27–36.3)
BLD GP AB SCN SERPL QL: NEGATIVE — SIGNIFICANT CHANGE UP
BUN SERPL-MCNC: 7 MG/DL — SIGNIFICANT CHANGE UP (ref 7–23)
CALCIUM SERPL-MCNC: 7.9 MG/DL — LOW (ref 8.4–10.5)
CHLORIDE SERPL-SCNC: 103 MMOL/L — SIGNIFICANT CHANGE UP (ref 98–107)
CO2 SERPL-SCNC: 29 MMOL/L — SIGNIFICANT CHANGE UP (ref 22–31)
CREAT SERPL-MCNC: 0.27 MG/DL — LOW (ref 0.5–1.3)
EGFR: 123 ML/MIN/1.73M2 — SIGNIFICANT CHANGE UP
GAS PNL BLDA: SIGNIFICANT CHANGE UP
GLUCOSE BLDC GLUCOMTR-MCNC: 137 MG/DL — HIGH (ref 70–99)
GLUCOSE SERPL-MCNC: 138 MG/DL — HIGH (ref 70–99)
HCT VFR BLD CALC: 26.4 % — LOW (ref 34.5–45)
HCT VFR BLD CALC: 26.5 % — LOW (ref 34.5–45)
HGB BLD-MCNC: 8.4 G/DL — LOW (ref 11.5–15.5)
HGB BLD-MCNC: 8.5 G/DL — LOW (ref 11.5–15.5)
INR BLD: 1.64 RATIO — HIGH (ref 0.88–1.16)
MCHC RBC-ENTMCNC: 28.7 PG — SIGNIFICANT CHANGE UP (ref 27–34)
MCHC RBC-ENTMCNC: 29 PG — SIGNIFICANT CHANGE UP (ref 27–34)
MCHC RBC-ENTMCNC: 31.8 GM/DL — LOW (ref 32–36)
MCHC RBC-ENTMCNC: 32.1 GM/DL — SIGNIFICANT CHANGE UP (ref 32–36)
MCV RBC AUTO: 90.1 FL — SIGNIFICANT CHANGE UP (ref 80–100)
MCV RBC AUTO: 90.4 FL — SIGNIFICANT CHANGE UP (ref 80–100)
NRBC # BLD: 0 /100 WBCS — SIGNIFICANT CHANGE UP (ref 0–0)
NRBC # BLD: 0 /100 WBCS — SIGNIFICANT CHANGE UP (ref 0–0)
NRBC # FLD: 0 K/UL — SIGNIFICANT CHANGE UP (ref 0–0)
NRBC # FLD: 0 K/UL — SIGNIFICANT CHANGE UP (ref 0–0)
PLATELET # BLD AUTO: 105 K/UL — LOW (ref 150–400)
PLATELET # BLD AUTO: 111 K/UL — LOW (ref 150–400)
POTASSIUM SERPL-MCNC: 3.8 MMOL/L — SIGNIFICANT CHANGE UP (ref 3.5–5.3)
POTASSIUM SERPL-SCNC: 3.8 MMOL/L — SIGNIFICANT CHANGE UP (ref 3.5–5.3)
PROTHROM AB SERPL-ACNC: 19.1 SEC — HIGH (ref 10.5–13.4)
RBC # BLD: 2.93 M/UL — LOW (ref 3.8–5.2)
RBC # BLD: 2.93 M/UL — LOW (ref 3.8–5.2)
RBC # FLD: 15.9 % — HIGH (ref 10.3–14.5)
RBC # FLD: 16.1 % — HIGH (ref 10.3–14.5)
RH IG SCN BLD-IMP: POSITIVE — SIGNIFICANT CHANGE UP
SODIUM SERPL-SCNC: 136 MMOL/L — SIGNIFICANT CHANGE UP (ref 135–145)
WBC # BLD: 1.6 K/UL — LOW (ref 3.8–10.5)
WBC # BLD: 2.18 K/UL — LOW (ref 3.8–10.5)
WBC # FLD AUTO: 1.6 K/UL — LOW (ref 3.8–10.5)
WBC # FLD AUTO: 2.18 K/UL — LOW (ref 3.8–10.5)

## 2023-02-05 PROCEDURE — 99222 1ST HOSP IP/OBS MODERATE 55: CPT

## 2023-02-05 PROCEDURE — 99254 IP/OBS CNSLTJ NEW/EST MOD 60: CPT

## 2023-02-05 PROCEDURE — 71045 X-RAY EXAM CHEST 1 VIEW: CPT | Mod: 26

## 2023-02-05 PROCEDURE — 93010 ELECTROCARDIOGRAM REPORT: CPT

## 2023-02-05 PROCEDURE — 71275 CT ANGIOGRAPHY CHEST: CPT | Mod: 26

## 2023-02-05 RX ORDER — SODIUM CHLORIDE 9 MG/ML
500 INJECTION, SOLUTION INTRAVENOUS ONCE
Refills: 0 | Status: COMPLETED | OUTPATIENT
Start: 2023-02-05 | End: 2023-02-05

## 2023-02-05 RX ORDER — ALBUMIN HUMAN 25 %
250 VIAL (ML) INTRAVENOUS ONCE
Refills: 0 | Status: COMPLETED | OUTPATIENT
Start: 2023-02-05 | End: 2023-02-05

## 2023-02-05 RX ORDER — SODIUM CHLORIDE 9 MG/ML
1000 INJECTION, SOLUTION INTRAVENOUS ONCE
Refills: 0 | Status: COMPLETED | OUTPATIENT
Start: 2023-02-05 | End: 2023-02-05

## 2023-02-05 RX ORDER — HYDROCORTISONE 20 MG
100 TABLET ORAL ONCE
Refills: 0 | Status: COMPLETED | OUTPATIENT
Start: 2023-02-05 | End: 2023-02-05

## 2023-02-05 RX ORDER — HYDROCORTISONE 20 MG
50 TABLET ORAL EVERY 6 HOURS
Refills: 0 | Status: DISCONTINUED | OUTPATIENT
Start: 2023-02-05 | End: 2023-02-07

## 2023-02-05 RX ORDER — SODIUM CHLORIDE 9 MG/ML
1000 INJECTION, SOLUTION INTRAVENOUS
Refills: 0 | Status: DISCONTINUED | OUTPATIENT
Start: 2023-02-05 | End: 2023-02-08

## 2023-02-05 RX ORDER — LACTULOSE 10 G/15ML
5 SOLUTION ORAL EVERY 12 HOURS
Refills: 0 | Status: DISCONTINUED | OUTPATIENT
Start: 2023-02-05 | End: 2023-02-14

## 2023-02-05 RX ADMIN — Medication 975 MILLIGRAM(S): at 22:45

## 2023-02-05 RX ADMIN — GABAPENTIN 300 MILLIGRAM(S): 400 CAPSULE ORAL at 13:07

## 2023-02-05 RX ADMIN — LAMOTRIGINE 100 MILLIGRAM(S): 25 TABLET, ORALLY DISINTEGRATING ORAL at 21:45

## 2023-02-05 RX ADMIN — Medication 125 MILLILITER(S): at 22:57

## 2023-02-05 RX ADMIN — Medication 50 MILLIGRAM(S): at 18:00

## 2023-02-05 RX ADMIN — Medication 975 MILLIGRAM(S): at 13:55

## 2023-02-05 RX ADMIN — QUETIAPINE FUMARATE 50 MILLIGRAM(S): 200 TABLET, FILM COATED ORAL at 21:43

## 2023-02-05 RX ADMIN — APIXABAN 2.5 MILLIGRAM(S): 2.5 TABLET, FILM COATED ORAL at 18:01

## 2023-02-05 RX ADMIN — SODIUM CHLORIDE 1000 MILLILITER(S): 9 INJECTION, SOLUTION INTRAVENOUS at 01:21

## 2023-02-05 RX ADMIN — LAMOTRIGINE 100 MILLIGRAM(S): 25 TABLET, ORALLY DISINTEGRATING ORAL at 10:48

## 2023-02-05 RX ADMIN — APIXABAN 2.5 MILLIGRAM(S): 2.5 TABLET, FILM COATED ORAL at 06:44

## 2023-02-05 RX ADMIN — ESCITALOPRAM OXALATE 20 MILLIGRAM(S): 10 TABLET, FILM COATED ORAL at 12:16

## 2023-02-05 RX ADMIN — SODIUM CHLORIDE 1000 MILLILITER(S): 9 INJECTION, SOLUTION INTRAVENOUS at 09:53

## 2023-02-05 RX ADMIN — LACTULOSE 5 GRAM(S): 10 SOLUTION ORAL at 18:01

## 2023-02-05 RX ADMIN — Medication 975 MILLIGRAM(S): at 06:43

## 2023-02-05 RX ADMIN — Medication 975 MILLIGRAM(S): at 08:00

## 2023-02-05 RX ADMIN — Medication 975 MILLIGRAM(S): at 13:07

## 2023-02-05 RX ADMIN — Medication 975 MILLIGRAM(S): at 21:44

## 2023-02-05 RX ADMIN — SODIUM CHLORIDE 100 MILLILITER(S): 9 INJECTION, SOLUTION INTRAVENOUS at 09:52

## 2023-02-05 RX ADMIN — Medication 100 MILLIGRAM(S): at 22:06

## 2023-02-05 RX ADMIN — SODIUM CHLORIDE 1000 MILLILITER(S): 9 INJECTION, SOLUTION INTRAVENOUS at 01:25

## 2023-02-05 RX ADMIN — GABAPENTIN 300 MILLIGRAM(S): 400 CAPSULE ORAL at 21:44

## 2023-02-05 RX ADMIN — Medication 125 MILLILITER(S): at 14:36

## 2023-02-05 RX ADMIN — GABAPENTIN 300 MILLIGRAM(S): 400 CAPSULE ORAL at 06:44

## 2023-02-05 RX ADMIN — Medication 100 MILLIGRAM(S): at 14:36

## 2023-02-05 RX ADMIN — SODIUM CHLORIDE 16.67 MILLILITER(S): 9 INJECTION, SOLUTION INTRAVENOUS at 11:27

## 2023-02-05 NOTE — PROGRESS NOTE ADULT - SUBJECTIVE AND OBJECTIVE BOX
Ortho Progress Note    S: Patient seen and examined. Overnight had a rapid response for hypotension SBP to 50s and hypoxia to 80s. Responded well to fluid boluses. CXR and CTPA negative. Admitted to SICU for enhanced blood pressure monitoring. Pain well controlled with current regimen. Denies lightheadedness/dizziness, CP/SOB. Tolerating diet.       O:  General: NAD  Neuro: Alert  and oriented  Resp: Non-labored breathing on NC  Right Lower extremity:          Dressing: clean/dry/intact            Sensation: SILT         Motor exam: baseline with RLE foot drop        compartments soft         warm well perfused     Vital Signs Last 24 Hrs  T(C): 38.2 (05 Feb 2023 08:00), Max: 38.3 (05 Feb 2023 01:12)  T(F): 100.7 (05 Feb 2023 08:00), Max: 101 (05 Feb 2023 01:12)  HR: 101 (05 Feb 2023 09:00) (70 - 111)  BP: 91/50 (05 Feb 2023 09:00) (71/58 - 116/63)  BP(mean): 59 (05 Feb 2023 09:00) (58 - 79)  RR: 19 (05 Feb 2023 09:00) (13 - 21)  SpO2: 99% (05 Feb 2023 09:00) (92% - 100%)    Parameters below as of 05 Feb 2023 09:00  Patient On (Oxygen Delivery Method): nasal cannula  O2 Flow (L/min): 2                            8.5    2.18  )-----------( 105      ( 05 Feb 2023 01:25 )             26.5                         10.4   3.74  )-----------( 124      ( 04 Feb 2023 05:00 )             31.3       02-05    136  |  103  |  7   ----------------------------<  138<H>  3.8   |  29  |  0.27<L>        PT/INR - ( 05 Feb 2023 01:25 )   PT: 19.1 sec;   INR: 1.64 ratio         PTT - ( 05 Feb 2023 01:25 )  PTT:34.7 sec

## 2023-02-05 NOTE — CONSULT NOTE ADULT - SUBJECTIVE AND OBJECTIVE BOX
SICU Consult Note  =====================================================    HPI:  64 y/o female with PMHx of MS (bedbound) now s/p Right Hemiarthroplasty on 2/2/23. Rapid response was called because patient became hypotensive and desaturated to 80s on surgical floor. Blood pressure slightly responsive to fluid, satting well on NC. CTA done to r/o PE (negative). SICU consulted for hemodynamic monitoring.     PAST MEDICAL & SURGICAL HISTORY:  Multiple Sclerosis  Neuralgia  Depression  No significant past surgical history          ALLERGIES:  No Known Allergies      --------------------------------------------------------------------------------------    MEDICATIONS:    Neurologic Medications  acetaminophen     Tablet .. 975 milliGRAM(s) Oral every 8 hours  ALPRAZolam 0.5 milliGRAM(s) Oral every 12 hours PRN anxiety  baclofen 5 milliGRAM(s) Oral every 8 hours PRN Muscle Spasm  escitalopram 20 milliGRAM(s) Oral daily  gabapentin 300 milliGRAM(s) Oral every 8 hours  lamoTRIgine 100 milliGRAM(s) Oral every 12 hours  melatonin 3 milliGRAM(s) Oral at bedtime PRN Insomnia  ondansetron Injectable 4 milliGRAM(s) IV Push every 6 hours PRN Nausea and/or Vomiting  oxyCODONE    IR 2.5 milliGRAM(s) Oral every 4 hours PRN Moderate Pain (4 - 6)  oxyCODONE    IR 5 milliGRAM(s) Oral every 4 hours PRN Severe Pain (7 - 10)  QUEtiapine 50 milliGRAM(s) Oral at bedtime  traZODone 100 milliGRAM(s) Oral at bedtime    Respiratory Medications    Cardiovascular Medications    Gastrointestinal Medications  bisacodyl Suppository 10 milliGRAM(s) Rectal daily PRN If no bowel movement by POD#2  lactated ringers Bolus 1000 milliLiter(s) IV Bolus once  lactated ringers Bolus 1000 milliLiter(s) IV Bolus once  polyethylene glycol 3350 17 Gram(s) Oral daily  senna 2 Tablet(s) Oral at bedtime  sodium chloride 0.9%. 1000 milliLiter(s) IV Continuous <Continuous>    Genitourinary Medications    Hematologic/Oncologic Medications  apixaban 2.5 milliGRAM(s) Oral every 12 hours    Antimicrobial/Immunologic Medications    Endocrine/Metabolic Medications    Topical/Other Medications  chlorhexidine 2% Cloths 1 Application(s) Topical once  povidone iodine 5% Nasal Swab 1 Application(s) Both Nostrils once  Vumerity 231mg DR capsule 2 Capsule(s) 2 Capsule(s) Oral two times a day    --------------------------------------------------------------------------------------    VITAL SIGNS:  ICU Vital Signs Last 24 Hrs  T(C): 38.3 (05 Feb 2023 01:12), Max: 38.3 (05 Feb 2023 01:12)  T(F): 101 (05 Feb 2023 01:12), Max: 101 (05 Feb 2023 01:12)  HR: 70 (05 Feb 2023 01:12) (70 - 111)  BP: 71/58 (05 Feb 2023 01:12) (71/58 - 104/51)  RR: 16 (05 Feb 2023 01:12) (16 - 18)  SpO2: 92% (05 Feb 2023 01:12) (92% - 96%)    O2 Parameters below as of 05 Feb 2023 01:12  Patient On (Oxygen Delivery Method): room air      --------------------------------------------------------------------------------------    INS AND OUTS:  I&O's Detail    03 Feb 2023 07:01  -  04 Feb 2023 07:00  --------------------------------------------------------  IN:    Lactated Ringers: 300 mL    Oral Fluid: 440 mL  Total IN: 740 mL    OUT:    Voided (mL): 1450 mL  Total OUT: 1450 mL    Total NET: -710 mL      04 Feb 2023 07:01  -  05 Feb 2023 03:06  --------------------------------------------------------  IN:  Total IN: 0 mL    OUT:    Voided (mL): 3000 mL  Total OUT: 3000 mL    Total NET: -3000 mL        --------------------------------------------------------------------------------------    EXAM  NEUROLOGY  Exam: Normal, in no acute distress.  Alert and oriented x4.      RESPIRATORY  Exam: Normal expansion/effort. No signs of respiratory distress. :     CARDIOVASCULAR  Exam: S1, S2.  Regular rate and rhythm.      GI/NUTRITION  Exam: Abdomen soft, Non-tender, Non-distended.    Current Diet: Regular     VASCULAR  Exam: Extremities warm, pink. No signs of hematoma or ecchymosis of right hip/surgical site.     MUSCULOSKELETAL  Exam: All extremities moving spontaneously.       METABOLIC / FLUIDS / ELECTROLYTES  lactated ringers Bolus 1000 milliLiter(s) IV Bolus once  lactated ringers Bolus 1000 milliLiter(s) IV Bolus once  sodium chloride 0.9%. 1000 milliLiter(s) IV Continuous <Continuous>      HEMATOLOGIC  [x] VTE Prophylaxis: apixaban 2.5 milliGRAM(s) Oral every 12 hours    TUBES / LINES / DRAINS    [x] Peripheral IV  [] Central Venous Line     	[] R	[] L	[] IJ	[] Fem	[] SC	Date Placed:   [] Arterial Line		[] R	[] L	[] Fem	[] Rad	[] Ax	Date Placed:   [] PICC		[] Midline		[] Mediport  [] Urinary Catheter		Date Placed:   [x] Necessity of urinary, arterial, and venous catheters discussed    --------------------------------------------------------------------------------------    LABS                          8.5    2.18  )-----------( 105      ( 05 Feb 2023 01:25 )             26.5     02-05    136  |  103  |  7   ----------------------------<  138<H>  3.8   |  29  |  0.27<L>    Ca    7.9<L>      05 Feb 2023 01:25      ABG - ( 05 Feb 2023 01:35 )  pH, Arterial: 7.39  pH, Blood: x     /  pCO2: 47    /  pO2: 138   / HCO3: 28    / Base Excess: 3.1   /  SaO2: 99.0              --------------------------------------------------------------------------------------

## 2023-02-05 NOTE — PROGRESS NOTE ADULT - ASSESSMENT
Pt is a 63y y/o  Female   s/p Right Hemiarthroplasty on 2/2, had a rapid response overnight for hypotension, responsive to fluids now stable in SICU    -    Multimodal Pain control  -    DVT ppx: Eliquis   -    Check AM labs- requesting transfusion of pRBC for Hgb < 8.0  -    Weight bearing status: WBAT   -    PT/OT  -    Dispo:  TBD

## 2023-02-05 NOTE — PROVIDER CONTACT NOTE (OTHER) - ASSESSMENT
A&Ox 4, afebrile on room air. no acute distress. denies chest pain/SOB.
Met and spoke with patient and mother at bedside in ED.  Pt states she has 24/7 HHA service. Pt is bedbound with MS.  This morning no HHA showed up.  She states [facility] was in the middle of switching vendors. Care coordinator at [facility] is Marilee Purdy.  The previous vendor until 1/31/2023 was [employer]  and the new vendor is [employer]  [phone].  Called and spoke with Maria R at [facility], she states [employer]  has authorization for HHA.  Called [employer]  and reached after hour service.  Left message for call back from on-call coordinator.   Unsafe discharge home until HHA is in place.  Dr. Corral and patient in agreement with plan.  CM to follow up tomorrow for safe discharge plan.    Received call back from [employer]  Taniya Rosen, she states [facility] never received auth from [facility] via electronic or fax.  She left message for day coordinator to follow up with [facility] after 9am.
A&Ox4, lethargic,  denies chest pain/SOB. temp 101, HR 70, BP 71/58, 16RR, 92% sat

## 2023-02-05 NOTE — CONSULT NOTE ADULT - ATTENDING COMMENTS
I agree with the detailed interval history, physical, and plan, which I have reviewed and edited where appropriate'; also agree with notes/assessment with my team on service.  I have personally examined the patient.  I was physically present for the key portions of the evaluation and management (E/M) service provided.  I reviewed all the pertinent data.  The patient is a critical care patient with life threatening hemodynamic and metabolic instability in SICU.  The SICU team has a constant risk benefit analyzes discussion and coordinating care with the primary team and all consultants.   The patient is in SICU with the chief complaint and diagnosis mentioned in the note.   The plan will be specified in the note.  62 y/o female s/p Right Hemiarthroplasty, post op hypotensive and desaturated to 80s on surgical floor. SICU consulted for hemodynamic monitoring.   EXAM  NEUROLOGY  Exam:  Alert and oriented x4.    RESPIRATORY  Exam: clear  CARDIOVASCULAR  Exam:  Regular rate  GI/NUTRITION  Exam: Abdomen soft, Non-tender,   VASCULAR  Exam: Extremities warm,    MUSCULOSKELETAL  Exam: All extremities moving spontaneously.     PLAN:   Neurologic:   -neurovascular checks  - gabapentin, lexapro, vumerity, lamotrigine, seroquel  Respiratory:  - maintain SPO> 92%  Cardiovascular:   - MAP >65   Gastrointestinal/Nutrition:   - Diet: Regular   Renal/Genitourinary:   - monitor electrolytes  Hematologic:   - DVT ppx: Eliquis 2.5 bid   Infectious Disease:   - hold off on abx   Endocrine:  - ISS  DISPO: SICU

## 2023-02-05 NOTE — CONSULT NOTE ADULT - ASSESSMENT
64 yo F w/ PMH Trigeminal Neuralgia, MS who presents 2/2 R hip pain found to have R femoral neck fracture. Cardiology was consulted for pre-op risk stratification.     #Pre-op risk stratification   #R femoral neck fracture     Cardiovascular Risk Stratification - RCRI = 0  1. History of ischemic heart disease:   2. History of congestive heart failure:   3. History of cerebrovascular disease (stroke or transient ischemic attack):   4. History of diabetes requiring preoperative insulin use:   5. Chronic kidney disease (creatinine > 2 mg/dL):   6. Undergoing suprainguinal vascular, intraperitoneal, or intrathoracic surgery:   0 predictors = 0.4%, 1 predictor = 0.9%, 2 predictors = 6.6%, =3 predictors = >11%    - Overall this patient is as 0.4% risk (for cardiac death, nonfatal myocardial infarction, and nonfatal cardiac arrest perioperatively for this moderate risk procedure).    Echo unrevealing. Will discuss with attending regarding ischemic eval given patient at baseline is non-ambulatory.        
ASSESSMENT:  64 y/o female with PMHx of MS (bedbound) now s/p Right Hemiarthroplasty on 2/2/23. Rapid response was called because patient became hypotensive and desaturated to 80s on surgical floor. Blood pressure slightly responsive to fluid, satting well on NC. CTA done to r/o PE (negative). SICU consulted for hemodynamic monitoring.       PLAN:   Neurologic:   - hx of MS  - q8hr neurovascular checks s/p surgery  - pain control prn  - continue home meds (gabapentin, lexapro, vumerity, lamotrigine, seroquel)     Respiratory:  - satting well on NC  - maintain SPO> 92%    Cardiovascular:   - MAP >65   - monitor hemodynamics  - s/p 2L bolus    Gastrointestinal/Nutrition:   - Diet: Regular   - bowel regimen  - zofran prn for n/v     Renal/Genitourinary:   - strict I&Os  - monitor electrolytes, replete prn    Hematologic:   - DVT ppx: Eliquis 2.5 bid   - monitor H/H     Infectious Disease:   - Febrile  - f/u blood cultures from 2/5   - hold off on abx for now  - monitor WBC    Endocrine:  - hyperglycemic   - ISS    Lines/Tubes:   -PIV     DISPO: SICU
HPI: 63 F MS bedbound, depression who presents w/ visiting NP noticed patient was home alone w/o 24/7 aid.

## 2023-02-05 NOTE — CHART NOTE - NSCHARTNOTEFT_GEN_A_CORE
63 year old female with history of MS now POD 2 s/p Right hip hemiarthroplasty, was called for rapid response for hypotension. MD came to bedside and found patient to be hypoxic to 80%, hypotensive with SBP in 50s, b/l lung sounds, FS 130s. Patient was mentating by mouthing words and reporting difficulty breathing and feeling tired. 5L Nasal canula was applied with good response with sat to 99%, 2L fluid bolus given with increase in SBP to . CBC, BMP, and ABG were drawn. CXR showed cleared lungs. ABG showed hypercapnia pCO2 to 47. Patient continue to report mild dyspnea however NC was able to comfortably wean down to 3L.    Plan  -  Follow up repeat CBC  - CTA of chest to rule out PE  - SICU admission for close monitoring of hemodynamic and respiratory status  - Plan discussed with Attending Dr. Stephen MD, PGY 4  B Team Surgery  [id number] 63 year old female with history of MS now POD 2 s/p Right hip hemiarthroplasty, was called for rapid response for hypotension. MD came to bedside and found patient to be hypoxic to 80%, hypotensive with SBP in 50s, b/l lung sounds, FS 130s. Patient was mentating by mouthing words and reporting difficulty breathing and feeling tired. 5L Nasal canula was applied with good response with sat to 99%, 2L fluid bolus given with increase in SBP to . CBC, BMP, and ABG were drawn. CXR showed cleared lungs. ABG showed hypercapnia pCO2 to 47. Patient continue to report mild dyspnea however NC was able to comfortably wean down to 3L.    Plan  - Follow up repeat CBC  - Patient currently without need for intubation, will reconsider if worsening respiratory status  - Patient on eliquis 2.5 for DVT ppx, but given high risk of thrombosis with orthopedic surgery, hypoxia and dyspnea, negative CXR finding, will obtain CTA of chest to rule out PE  - SICU admission for close monitoring of hemodynamic and respiratory status  - Plan discussed with Attending Dr. Stephen MD, PGY 4  B Team Surgery  [id number]

## 2023-02-06 LAB
ANION GAP SERPL CALC-SCNC: 5 MMOL/L — LOW (ref 7–14)
BASOPHILS # BLD AUTO: 0 K/UL — SIGNIFICANT CHANGE UP (ref 0–0.2)
BASOPHILS NFR BLD AUTO: 0 % — SIGNIFICANT CHANGE UP (ref 0–2)
BUN SERPL-MCNC: 9 MG/DL — SIGNIFICANT CHANGE UP (ref 7–23)
CALCIUM SERPL-MCNC: 8.5 MG/DL — SIGNIFICANT CHANGE UP (ref 8.4–10.5)
CHLORIDE SERPL-SCNC: 108 MMOL/L — HIGH (ref 98–107)
CO2 SERPL-SCNC: 30 MMOL/L — SIGNIFICANT CHANGE UP (ref 22–31)
CREAT SERPL-MCNC: <0.2 MG/DL — LOW (ref 0.5–1.3)
EGFR: 132 ML/MIN/1.73M2 — SIGNIFICANT CHANGE UP
EOSINOPHIL # BLD AUTO: 0 K/UL — SIGNIFICANT CHANGE UP (ref 0–0.5)
EOSINOPHIL NFR BLD AUTO: 0 % — SIGNIFICANT CHANGE UP (ref 0–6)
GLUCOSE SERPL-MCNC: 141 MG/DL — HIGH (ref 70–99)
HCT VFR BLD CALC: 23.5 % — LOW (ref 34.5–45)
HGB BLD-MCNC: 7.5 G/DL — LOW (ref 11.5–15.5)
IANC: 0.35 K/UL — LOW (ref 1.8–7.4)
IMM GRANULOCYTES NFR BLD AUTO: 0 % — SIGNIFICANT CHANGE UP (ref 0–0.9)
LYMPHOCYTES # BLD AUTO: 0.18 K/UL — LOW (ref 1–3.3)
LYMPHOCYTES # BLD AUTO: 19.8 % — SIGNIFICANT CHANGE UP (ref 13–44)
MAGNESIUM SERPL-MCNC: 1.6 MG/DL — SIGNIFICANT CHANGE UP (ref 1.6–2.6)
MCHC RBC-ENTMCNC: 28.7 PG — SIGNIFICANT CHANGE UP (ref 27–34)
MCHC RBC-ENTMCNC: 31.9 GM/DL — LOW (ref 32–36)
MCV RBC AUTO: 90 FL — SIGNIFICANT CHANGE UP (ref 80–100)
MONOCYTES # BLD AUTO: 0.38 K/UL — SIGNIFICANT CHANGE UP (ref 0–0.9)
MONOCYTES NFR BLD AUTO: 41.8 % — HIGH (ref 2–14)
NEUTROPHILS # BLD AUTO: 0.35 K/UL — LOW (ref 1.8–7.4)
NEUTROPHILS NFR BLD AUTO: 38.4 % — LOW (ref 43–77)
NRBC # BLD: 0 /100 WBCS — SIGNIFICANT CHANGE UP (ref 0–0)
NRBC # FLD: 0 K/UL — SIGNIFICANT CHANGE UP (ref 0–0)
PHOSPHATE SERPL-MCNC: 2.8 MG/DL — SIGNIFICANT CHANGE UP (ref 2.5–4.5)
PLATELET # BLD AUTO: 105 K/UL — LOW (ref 150–400)
POTASSIUM SERPL-MCNC: 3.1 MMOL/L — LOW (ref 3.5–5.3)
POTASSIUM SERPL-SCNC: 3.1 MMOL/L — LOW (ref 3.5–5.3)
RBC # BLD: 2.61 M/UL — LOW (ref 3.8–5.2)
RBC # FLD: 15.9 % — HIGH (ref 10.3–14.5)
SODIUM SERPL-SCNC: 143 MMOL/L — SIGNIFICANT CHANGE UP (ref 135–145)
WBC # BLD: 0.92 K/UL — CRITICAL LOW (ref 3.8–10.5)
WBC # FLD AUTO: 0.92 K/UL — CRITICAL LOW (ref 3.8–10.5)

## 2023-02-06 PROCEDURE — 99233 SBSQ HOSP IP/OBS HIGH 50: CPT | Mod: GC

## 2023-02-06 RX ORDER — POTASSIUM PHOSPHATE, MONOBASIC POTASSIUM PHOSPHATE, DIBASIC 236; 224 MG/ML; MG/ML
15 INJECTION, SOLUTION INTRAVENOUS ONCE
Refills: 0 | Status: COMPLETED | OUTPATIENT
Start: 2023-02-06 | End: 2023-02-06

## 2023-02-06 RX ORDER — POTASSIUM CHLORIDE 20 MEQ
20 PACKET (EA) ORAL
Refills: 0 | Status: COMPLETED | OUTPATIENT
Start: 2023-02-06 | End: 2023-02-06

## 2023-02-06 RX ORDER — MAGNESIUM SULFATE 500 MG/ML
1 VIAL (ML) INJECTION ONCE
Refills: 0 | Status: COMPLETED | OUTPATIENT
Start: 2023-02-06 | End: 2023-02-06

## 2023-02-06 RX ORDER — POTASSIUM CHLORIDE 20 MEQ
20 PACKET (EA) ORAL ONCE
Refills: 0 | Status: COMPLETED | OUTPATIENT
Start: 2023-02-06 | End: 2023-02-06

## 2023-02-06 RX ORDER — POTASSIUM CHLORIDE 20 MEQ
20 PACKET (EA) ORAL ONCE
Refills: 0 | Status: DISCONTINUED | OUTPATIENT
Start: 2023-02-06 | End: 2023-02-06

## 2023-02-06 RX ADMIN — LAMOTRIGINE 100 MILLIGRAM(S): 25 TABLET, ORALLY DISINTEGRATING ORAL at 12:02

## 2023-02-06 RX ADMIN — Medication 975 MILLIGRAM(S): at 05:02

## 2023-02-06 RX ADMIN — Medication 975 MILLIGRAM(S): at 06:00

## 2023-02-06 RX ADMIN — Medication 50 MILLIGRAM(S): at 17:57

## 2023-02-06 RX ADMIN — Medication 50 MILLIGRAM(S): at 05:01

## 2023-02-06 RX ADMIN — POTASSIUM PHOSPHATE, MONOBASIC POTASSIUM PHOSPHATE, DIBASIC 62.5 MILLIMOLE(S): 236; 224 INJECTION, SOLUTION INTRAVENOUS at 02:24

## 2023-02-06 RX ADMIN — SODIUM CHLORIDE 100 MILLILITER(S): 9 INJECTION, SOLUTION INTRAVENOUS at 00:36

## 2023-02-06 RX ADMIN — ESCITALOPRAM OXALATE 20 MILLIGRAM(S): 10 TABLET, FILM COATED ORAL at 12:01

## 2023-02-06 RX ADMIN — Medication 20 MILLIEQUIVALENT(S): at 06:21

## 2023-02-06 RX ADMIN — Medication 975 MILLIGRAM(S): at 22:48

## 2023-02-06 RX ADMIN — Medication 975 MILLIGRAM(S): at 12:01

## 2023-02-06 RX ADMIN — Medication 20 MILLIEQUIVALENT(S): at 04:18

## 2023-02-06 RX ADMIN — Medication 50 MILLIGRAM(S): at 12:02

## 2023-02-06 RX ADMIN — APIXABAN 2.5 MILLIGRAM(S): 2.5 TABLET, FILM COATED ORAL at 05:03

## 2023-02-06 RX ADMIN — LACTULOSE 5 GRAM(S): 10 SOLUTION ORAL at 05:03

## 2023-02-06 RX ADMIN — Medication 20 MILLIEQUIVALENT(S): at 14:15

## 2023-02-06 RX ADMIN — OXYCODONE HYDROCHLORIDE 5 MILLIGRAM(S): 5 TABLET ORAL at 14:31

## 2023-02-06 RX ADMIN — APIXABAN 2.5 MILLIGRAM(S): 2.5 TABLET, FILM COATED ORAL at 17:57

## 2023-02-06 RX ADMIN — Medication 100 MILLIGRAM(S): at 22:33

## 2023-02-06 RX ADMIN — SODIUM CHLORIDE 100 MILLILITER(S): 9 INJECTION, SOLUTION INTRAVENOUS at 23:56

## 2023-02-06 RX ADMIN — Medication 100 GRAM(S): at 12:01

## 2023-02-06 RX ADMIN — Medication 975 MILLIGRAM(S): at 21:45

## 2023-02-06 RX ADMIN — QUETIAPINE FUMARATE 50 MILLIGRAM(S): 200 TABLET, FILM COATED ORAL at 21:47

## 2023-02-06 RX ADMIN — Medication 50 MILLIGRAM(S): at 00:29

## 2023-02-06 RX ADMIN — GABAPENTIN 300 MILLIGRAM(S): 400 CAPSULE ORAL at 12:02

## 2023-02-06 RX ADMIN — Medication 50 MILLIGRAM(S): at 23:54

## 2023-02-06 RX ADMIN — GABAPENTIN 300 MILLIGRAM(S): 400 CAPSULE ORAL at 21:45

## 2023-02-06 RX ADMIN — GABAPENTIN 300 MILLIGRAM(S): 400 CAPSULE ORAL at 05:02

## 2023-02-06 RX ADMIN — LACTULOSE 5 GRAM(S): 10 SOLUTION ORAL at 17:57

## 2023-02-06 RX ADMIN — LAMOTRIGINE 100 MILLIGRAM(S): 25 TABLET, ORALLY DISINTEGRATING ORAL at 21:46

## 2023-02-06 RX ADMIN — Medication 100 GRAM(S): at 02:23

## 2023-02-06 RX ADMIN — OXYCODONE HYDROCHLORIDE 5 MILLIGRAM(S): 5 TABLET ORAL at 15:01

## 2023-02-06 NOTE — DIETITIAN INITIAL EVALUATION ADULT - FUNCTIONAL SCREEN CURRENT LEVEL: SWALLOWING (IF SCORE 2 OR MORE FOR ANY ITEM, CONSULT REHAB SERVICES), MLM)
Please make sure that patient's kidney ultrasound performed few days ago is being faxed to the number that patient has provided    Thank you 0 = swallows foods/liquids without difficulty

## 2023-02-06 NOTE — DIETITIAN INITIAL EVALUATION ADULT - PERTINENT MEDS FT
MEDICATIONS  (STANDING):  acetaminophen     Tablet .. 975 milliGRAM(s) Oral every 8 hours  apixaban 2.5 milliGRAM(s) Oral every 12 hours  escitalopram 20 milliGRAM(s) Oral daily  gabapentin 300 milliGRAM(s) Oral every 8 hours  hydrocortisone sodium succinate Injectable 50 milliGRAM(s) IV Push every 6 hours  lactated ringers. 1000 milliLiter(s) (100 mL/Hr) IV Continuous <Continuous>  lactulose Syrup 5 Gram(s) Oral every 12 hours  lamoTRIgine 100 milliGRAM(s) Oral every 12 hours  magnesium sulfate  IVPB 1 Gram(s) IV Intermittent once  polyethylene glycol 3350 17 Gram(s) Oral daily  potassium chloride    Tablet ER 20 milliEquivalent(s) Oral once  povidone iodine 5% Nasal Swab 1 Application(s) Both Nostrils once  QUEtiapine 50 milliGRAM(s) Oral at bedtime  senna 2 Tablet(s) Oral at bedtime  traZODone 100 milliGRAM(s) Oral at bedtime  Vumerity 231mg DR capsule 2 Capsule(s) 2 Capsule(s) Oral two times a day    MEDICATIONS  (PRN):  ALPRAZolam 0.5 milliGRAM(s) Oral every 12 hours PRN anxiety  baclofen 5 milliGRAM(s) Oral every 8 hours PRN Muscle Spasm  bisacodyl Suppository 10 milliGRAM(s) Rectal daily PRN If no bowel movement by POD#2  melatonin 3 milliGRAM(s) Oral at bedtime PRN Insomnia  ondansetron Injectable 4 milliGRAM(s) IV Push every 6 hours PRN Nausea and/or Vomiting  oxyCODONE    IR 2.5 milliGRAM(s) Oral every 4 hours PRN Moderate Pain (4 - 6)  oxyCODONE    IR 5 milliGRAM(s) Oral every 4 hours PRN Severe Pain (7 - 10)

## 2023-02-06 NOTE — PROGRESS NOTE ADULT - SUBJECTIVE AND OBJECTIVE BOX
Ortho Progress Note    S: Patient seen and examined. No acute events overnight. Pain well controlled with current regimen. Denies lightheadedness/dizziness, CP/SOB. Pressures improving in SICU, receiving albumin       O:  Physical Exam:  Gen: Laying in bed, NAD, alert and oriented.   Resp: Unlabored breathing  Ext: RLE- dressing c/d/i         Motor exam consistent with baseline foot drop          + SILT DP/SP/ISAAC/Sa/T          +DP, extremity WWP    Vital Signs Last 24 Hrs  T(C): 36.2 (06 Feb 2023 04:00), Max: 38.2 (05 Feb 2023 08:00)  T(F): 97.2 (06 Feb 2023 04:00), Max: 100.7 (05 Feb 2023 08:00)  HR: 93 (06 Feb 2023 06:00) (77 - 114)  BP: 103/64 (06 Feb 2023 06:00) (72/44 - 112/56)  BP(mean): 77 (06 Feb 2023 06:00) (53 - 85)  RR: 16 (06 Feb 2023 06:00) (11 - 26)  SpO2: 96% (06 Feb 2023 06:00) (94% - 100%)    Parameters below as of 06 Feb 2023 07:00  Patient On (Oxygen Delivery Method): room air                              7.5    0.92  )-----------( 105      ( 06 Feb 2023 01:15 )             23.5                         8.4    1.60  )-----------( 111      ( 05 Feb 2023 11:50 )             26.4       02-06    143  |  108<H>  |  9   ----------------------------<  141<H>  3.1<L>   |  30  |  <0.20<L>        PT/INR - ( 05 Feb 2023 01:25 )   PT: 19.1 sec;   INR: 1.64 ratio         PTT - ( 05 Feb 2023 01:25 )  PTT:34.7 sec

## 2023-02-06 NOTE — PROGRESS NOTE ADULT - ATTENDING COMMENTS
Continue ICU resuscitation. Consider PRBC transfusion.  ABHILASH Chapman when stable. Decubitus prevention

## 2023-02-06 NOTE — DIETITIAN INITIAL EVALUATION ADULT - ORAL INTAKE PTA/DIET HISTORY
Lives with 24/7 HHA. Denies any changes in appetite or weight. Noted, pt is bedbound and unable to weigh herself at home.

## 2023-02-06 NOTE — PROGRESS NOTE ADULT - ATTENDING COMMENTS
I agree with the history, physical, and plan, which I have reviewed and edited where appropriate.  I agree with notes/assessment of health care providers on my service.  I have personally examined the patient.  I was physically present for the key portions of the evaluation and management (E/M) service provided.  I reviewed data and laboratory tests/x-rays and all pertinent electronic images.  The patient is a critical care patient with life threatening hemodynamic and metabolic instability in SICU.  Risk benefit analyses discussed.    The patient is in SICU with diagnosis mentioned in the note.    The plan is specified below.    64 y/o female with PMHx of MS (bedbound) now s/p Right Hemiarthroplasty on 2/2/23. Rapid response was called because patient became hypotensive and desaturated to 80s on surgical floor. Blood pressure responsive to bolus, satting well on NC. CTA done to r/o PE (negative). SICU consulted for hemodynamic monitoring.      PLAN:   Neurologic: hx of MS, postoperative pain   - q8hr neurovascular checks s/p surgery  - pain control- standing Tylenol, Oxy PRN  - continue home meds (gabapentin, lexapro, vumerity, lamotrigine, seroquel)     Respiratory:  - RA    Cardiovascular: hypotension,   - MAP >65     Gastrointestinal/Nutrition:   - Diet: Regular   - bowel regimen  - Zofran prn for n/v     Renal/Genitourinary:   - LR @100cc/hr  - silva     Hematologic: leukopenia likely due to vumerity   - DVT ppx: Eliquis 2.5 bid    - obtain differential   - heme consult if neutropenic - ? prophylactic abx vs stop vumerity    Infectious Disease:   - f/u BCx (2/5)  - observe off abx     Endocrine: relative adrenal insufficiency   - Glucose wnl on ISS  - Continue hydrocortisone 50mg q6h (stress dose steroids) I agree with the history, physical, and plan, which I have reviewed and edited where appropriate.  I agree with notes/assessment of health care providers on my service.  I have personally examined the patient.  I was physically present for the key portions of the evaluation and management (E/M) service provided.  I reviewed data and laboratory tests/x-rays and all pertinent electronic images.  The patient is a critical care patient with life threatening hemodynamic and metabolic instability in SICU.  Risk benefit analyses discussed.    The patient is in SICU with diagnosis mentioned in the note.    The plan is specified below.    62 y/o female with PMHx of MS (bedbound) now s/p Right Hemiarthroplasty on 2/2/23. Rapid response was called because patient became hypotensive and desaturated to 80s on surgical floor. Blood pressure responsive to bolus, satting well on NC. CTA done to r/o PE (negative). SICU consulted for hemodynamic monitoring.      PLAN:   Neurologic: hx of MS, postoperative pain   - q8hr neurovascular checks s/p surgery  - pain control- standing Tylenol, Oxy PRN  - continue home meds (gabapentin, lexapro, vumerity, lamotrigine, seroquel)     Respiratory:  - RA    Cardiovascular: hypotension,   - MAP >65     Gastrointestinal/Nutrition:   - Diet: Regular   - bowel regimen  - Zofran prn for n/v     Renal/Genitourinary:   - LR @100cc/hr  - voiding    Hematologic: leukopenia likely due to vumerity   - DVT ppx: Eliquis 2.5 bid    - obtain differential   - heme consult if neutropenic - ? prophylactic abx vs stop vumerity    Infectious Disease:   - f/u BCx (2/5)  - observe off abx     Endocrine: relative adrenal insufficiency   - Glucose wnl on ISS  - Continue hydrocortisone 50mg q6h (stress dose steroids)

## 2023-02-06 NOTE — PROVIDER CONTACT NOTE (CHANGE IN STATUS NOTIFICATION) - BACKGROUND
Pt is s/p repair of femur fracture and was a RRT on floor r/t to hypotension on 2/4. Pt is now hypotensive with a MAP between 58-65 with a SBP between 75 to 90.

## 2023-02-06 NOTE — DIETITIAN INITIAL EVALUATION ADULT - ADD RECOMMEND
1. Encourage PO intake and honor food preferences as able.   2. Continue to document PO in RN flow sheets and monitor weekly weights.   3. Multivitamin daily.

## 2023-02-06 NOTE — DIETITIAN INITIAL EVALUATION ADULT - REASON FOR ADMISSION
Fracture of unspecified part of neck of unspecified femur, initial encounter for closed fracture  62 y/o female with PMHx of MS (bedbound) now s/p Right Hemiarthroplasty on 2/2/23. Rapid response was called because patient became hypotensive and desaturated to 80s on surgical floor. Blood pressure slightly responsive to fluid, satting well on NC. CTA done to r/o PE (negative). SICU consulted for hemodynamic monitoring.

## 2023-02-06 NOTE — DIETITIAN INITIAL EVALUATION ADULT - OTHER INFO
A&Ox4. Per RN, pt is a picky eater; dislikes many menu items. Pt did not have any food preferences at this time. States she likes bland foods. No bottom teeth, but declined softer diet consistency. No reported GI issues (nausea/vomiting/diarrhea/constipation.) BM 2/5. Reported  pounds

## 2023-02-06 NOTE — PROGRESS NOTE ADULT - ASSESSMENT
62 y/o female with PMHx of MS (bedbound) now s/p Right Hemiarthroplasty on 2/2/23. Rapid response was called because patient became hypotensive and desaturated to 80s on surgical floor. Blood pressure slightly responsive to fluid, satting well on NC. CTA done to r/o PE (negative). SICU consulted for hemodynamic monitoring.      24 HOUR EVENTS:  - Remains hypotensive (asymptomatic) SBP 80s  - 250 albumin given ON   - Started stress dose steroids for possible adrenal insufficiency (patient took steroids 1/23)    PLAN:   Neurologic:   - hx of MS  - q8hr neurovascular checks s/p surgery  - pain control prn  - continue home meds (gabapentin, lexapro, vumerity, lamotrigine, seroquel)     Respiratory:  - satting well on NC  - maintain SPO> 92%    Cardiovascular:   - MAP >65   - monitor hemodynamics  - s/p 2L bolus    Gastrointestinal/Nutrition:   - Diet: Regular   - bowel regimen  - zofran prn for n/v     Renal/Genitourinary:   - strict I&Os  - monitor electrolytes, replete prn    Hematologic:   - DVT ppx: Eliquis 2.5 bid   - monitor H/H     Infectious Disease:   - Febrile  - f/u blood cultures from 2/5   - hold off on abx for now  - monitor WBC    Endocrine:  - hyperglycemic   - ISS    Lines/Tubes:   -PIV     DISPO: SICU     64 y/o female with PMHx of MS (bedbound) now s/p Right Hemiarthroplasty on 2/2/23. Rapid response was called because patient became hypotensive and desaturated to 80s on surgical floor. Blood pressure slightly responsive to fluid, satting well on NC. CTA done to r/o PE (negative). SICU consulted for hemodynamic monitoring.      24 HOUR EVENTS:  - Remains hypotensive (asymptomatic) SBP 80s  - 250 albumin given ON   - Started stress dose steroids for possible adrenal insufficiency (patient took steroids 1/23)    PLAN:   Neurologic:   - hx of MS  - q8hr neurovascular checks s/p surgery  - pain control- Tylenol, Oxy PRN  - continue home meds (gabapentin, lexapro, vumerity, lamotrigine, seroquel)     Respiratory:  - RA  - maintain SPO> 92%    Cardiovascular:   - MAP >65   - monitor hemodynamics  - s/p 2L bolus, 250cc albumin overnight    Gastrointestinal/Nutrition:   - Diet: Regular   - bowel regimen  - zofran prn for n/v     Renal/Genitourinary:   - LR @100cc/hr  - strict I&Os  - monitor electrolytes, replete prn    Hematologic:   - DVT ppx: Eliquis 2.5 bid   - monitor H/H     Infectious Disease:   - Afebrile overnight  - f/u BCx (2/5)  - hold off on abx for now  - monitor WBC    Endocrine:  - hyperglycemic   - ISS  - Hydrocortisone 50mg q6h    Lines/Tubes:   -PIV     DISPO: SICU     64 y/o female with PMHx of MS (bedbound) now s/p Right Hemiarthroplasty on 2/2/23. Rapid response was called because patient became hypotensive and desaturated to 80s on surgical floor. Blood pressure slightly responsive to fluid, satting well on NC. CTA done to r/o PE (negative). SICU consulted for hemodynamic monitoring.      24 HOUR EVENTS:  - intermittently hypotensive (asymptomatic) SBP 80s  - 250 albumin given ON   - Started stress dose steroids for possible adrenal insufficiency (patient took steroids 1/23)    PLAN:   Neurologic:   - hx of MS  - q8hr neurovascular checks s/p surgery  - pain control- Tylenol, Oxy PRN  - continue home meds (gabapentin, lexapro, vumerity, lamotrigine, seroquel)     Respiratory:  - RA  - maintain SPO> 92%    Cardiovascular:   - MAP >65   - monitor hemodynamics  - s/p 2L bolus, 250cc albumin overnight    Gastrointestinal/Nutrition:   - Diet: Regular   - bowel regimen  - zofran prn for n/v     Renal/Genitourinary:   - LR @100cc/hr  - strict I&Os  - monitor electrolytes, replete prn    Hematologic:   - DVT ppx: Eliquis 2.5 bid   - monitor H/H   - transfusion if Hb<7    Infectious Disease:   - Afebrile overnight  - f/u BCx (2/5)  - hold off on abx for now  - monitor WBC    Endocrine:  - Glucose wnl on ISS  - continue hydrocortisone 50mg q6h    Lines/Tubes:   -PIV     DISPO: SICU     64 y/o female with PMHx of MS (bedbound) now s/p Right Hemiarthroplasty on 2/2/23. Rapid response was called because patient became hypotensive and desaturated to 80s on surgical floor. Blood pressure slightly responsive to fluid, satting well on NC. CTA done to r/o PE (negative). SICU consulted for hemodynamic monitoring.        PLAN:   Neurologic:   - hx of MS  - q8hr neurovascular checks s/p surgery  - pain control- standing Tylenol, Oxy PRN  - continue home meds (gabapentin, lexapro, vumerity, lamotrigine, seroquel)     Respiratory:  - RA  - maintain SPO> 92%    Cardiovascular:   - MAP >65   - monitor hemodynamics  - s/p 250cc albumin overnight    Gastrointestinal/Nutrition:   - Diet: Regular   - bowel regimen  - Zofran prn for n/v     Renal/Genitourinary:   - LR @100cc/hr  - strict I&Os  - monitor electrolytes, replete prn    Hematologic:   - DVT ppx: Eliquis 2.5 bid   - monitor H/H   - transfusion if Hb<7    Infectious Disease:   - Afebrile overnight  - f/u BCx (2/5)  - hold off on abx for now  - monitor WBC    Endocrine:  - Glucose wnl on ISS  - Continue hydrocortisone 50mg q6h    Lines/Tubes:   -PIV     DISPO: SICU

## 2023-02-06 NOTE — PROGRESS NOTE ADULT - ASSESSMENT
ASSESSMENT/PLAN:   ALEXY STRATTON is a 63yFemale s/p R hip hemiarthroplasty, now POD3    - Pain control  - WBAT RLE  - PT/OT/OOB  - DVT ppx: Eliquis  - Orthopedics team requesting transfusion pRBC for Hgb < 8  - Appreciate care per SICU  - Dispo: pending PT eval

## 2023-02-06 NOTE — PROVIDER CONTACT NOTE (CHANGE IN STATUS NOTIFICATION) - ASSESSMENT
Pt remains A &Ox4 and is able to follow commands. Pt remains on RA with a RR of 15. Pt in NSR with a HR in the 90s. Pt is afebrile.

## 2023-02-06 NOTE — PROGRESS NOTE ADULT - SUBJECTIVE AND OBJECTIVE BOX
SICU Daily Progress Note  =====================================================  Interval/Overnight Events: 250cc Albumin bolus    ALLERGIES:  No Known Allergies      --------------------------------------------------------------------------------------    MEDICATIONS:    Neurologic Medications  acetaminophen     Tablet .. 975 milliGRAM(s) Oral every 8 hours  ALPRAZolam 0.5 milliGRAM(s) Oral every 12 hours PRN anxiety  baclofen 5 milliGRAM(s) Oral every 8 hours PRN Muscle Spasm  escitalopram 20 milliGRAM(s) Oral daily  gabapentin 300 milliGRAM(s) Oral every 8 hours  lamoTRIgine 100 milliGRAM(s) Oral every 12 hours  melatonin 3 milliGRAM(s) Oral at bedtime PRN Insomnia  ondansetron Injectable 4 milliGRAM(s) IV Push every 6 hours PRN Nausea and/or Vomiting  oxyCODONE    IR 2.5 milliGRAM(s) Oral every 4 hours PRN Moderate Pain (4 - 6)  oxyCODONE    IR 5 milliGRAM(s) Oral every 4 hours PRN Severe Pain (7 - 10)  QUEtiapine 50 milliGRAM(s) Oral at bedtime  traZODone 100 milliGRAM(s) Oral at bedtime    Respiratory Medications    Cardiovascular Medications    Gastrointestinal Medications  bisacodyl Suppository 10 milliGRAM(s) Rectal daily PRN If no bowel movement by POD#2  lactated ringers. 1000 milliLiter(s) IV Continuous <Continuous>  lactulose Syrup 5 Gram(s) Oral every 12 hours  polyethylene glycol 3350 17 Gram(s) Oral daily  senna 2 Tablet(s) Oral at bedtime    Genitourinary Medications    Hematologic/Oncologic Medications  apixaban 2.5 milliGRAM(s) Oral every 12 hours    Antimicrobial/Immunologic Medications    Endocrine/Metabolic Medications  hydrocortisone sodium succinate Injectable 50 milliGRAM(s) IV Push every 6 hours    Topical/Other Medications  povidone iodine 5% Nasal Swab 1 Application(s) Both Nostrils once  Vumerity 231mg DR capsule 2 Capsule(s) 2 Capsule(s) Oral two times a day    --------------------------------------------------------------------------------------    VITAL SIGNS:  ICU Vital Signs Last 24 Hrs  T(C): 36.7 (06 Feb 2023 00:00), Max: 38.3 (05 Feb 2023 01:12)  T(F): 98 (06 Feb 2023 00:00), Max: 101 (05 Feb 2023 01:12)  HR: 88 (06 Feb 2023 00:00) (70 - 114)  BP: 76/50 (06 Feb 2023 00:00) (71/58 - 116/63)  BP(mean): 60 (06 Feb 2023 00:00) (53 - 81)  ABP: --  ABP(mean): --  RR: 14 (06 Feb 2023 00:00) (11 - 26)  SpO2: 96% (06 Feb 2023 00:00) (92% - 100%)    O2 Parameters below as of 06 Feb 2023 00:00  Patient On (Oxygen Delivery Method): room air    --------------------------------------------------------------------------------------    INS AND OUTS:  I&O's Detail    04 Feb 2023 07:01  -  05 Feb 2023 07:00  --------------------------------------------------------  IN:    Lactated Ringers: 300 mL  Total IN: 300 mL    OUT:    Voided (mL): 3450 mL  Total OUT: 3450 mL    Total NET: -3150 mL      05 Feb 2023 07:01  -  06 Feb 2023 00:51  --------------------------------------------------------  IN:    IV PiggyBack: 1250 mL    Lactated Ringers: 1800 mL    Oral Fluid: 820 mL  Total IN: 3870 mL    OUT:    Voided (mL): 1250 mL  Total OUT: 1250 mL    Total NET: 2620 mL    --------------------------------------------------------------------------------------    EXAM  NEUROLOGY  Exam: Normal, in no acute distress.  Alert and oriented x4.  No focal neurologic deficits.     HEENT  Exam: Normocephalic, atraumatic, EOMI.     RESPIRATORY  Exam: Lungs clear to auscultation, Normal expansion/effort.    CARDIOVASCULAR  Exam: S1, S2.  Regular rate and rhythm.      GI/NUTRITION  Exam: Abdomen soft, Non-tender, Non-distended.     MUSCULOSKELETAL  Exam: Not moving her extremities.    METABOLIC / FLUIDS / ELECTROLYTES  lactated ringers. 1000 milliLiter(s) IV Continuous <Continuous>      HEMATOLOGIC  [x] VTE Prophylaxis: apixaban 2.5 milliGRAM(s) Oral every 12 hours    Transfusions:	[] PRBC	[] Platelets		[] FFP	[] Cryoprecipitate    INFECTIOUS DISEASE  Antimicrobials/Immunologic Medications:    Day #      of     ***    TUBES / LINES / DRAINS  ***  [x] Peripheral IV  [x] Necessity of urinary, arterial, and venous catheters discussed    --------------------------------------------------------------------------------------    LABS                          8.4    1.60  )-----------( 111      ( 05 Feb 2023 11:50 )             26.4   02-05    136  |  103  |  7   ----------------------------<  138<H>  3.8   |  29  |  0.27<L>    Ca    7.9<L>      05 Feb 2023 01:25      --------------------------------------------------------------------------------------    OTHER LABORATORY:     IMAGING STUDIES:   CXR:    SICU Daily Progress Note  =====================================================  Interval/Overnight Events:   - 250cc Albumin bolus  - remains hypotensive (asx) SBP 70s  - started stress does steroids for possible adrenal insufficiency (pt took steroids 1/23)    ALLERGIES:  No Known Allergies      --------------------------------------------------------------------------------------    MEDICATIONS:    Neurologic Medications  acetaminophen     Tablet .. 975 milliGRAM(s) Oral every 8 hours  ALPRAZolam 0.5 milliGRAM(s) Oral every 12 hours PRN anxiety  baclofen 5 milliGRAM(s) Oral every 8 hours PRN Muscle Spasm  escitalopram 20 milliGRAM(s) Oral daily  gabapentin 300 milliGRAM(s) Oral every 8 hours  lamoTRIgine 100 milliGRAM(s) Oral every 12 hours  melatonin 3 milliGRAM(s) Oral at bedtime PRN Insomnia  ondansetron Injectable 4 milliGRAM(s) IV Push every 6 hours PRN Nausea and/or Vomiting  oxyCODONE    IR 2.5 milliGRAM(s) Oral every 4 hours PRN Moderate Pain (4 - 6)  oxyCODONE    IR 5 milliGRAM(s) Oral every 4 hours PRN Severe Pain (7 - 10)  QUEtiapine 50 milliGRAM(s) Oral at bedtime  traZODone 100 milliGRAM(s) Oral at bedtime    Respiratory Medications    Cardiovascular Medications    Gastrointestinal Medications  bisacodyl Suppository 10 milliGRAM(s) Rectal daily PRN If no bowel movement by POD#2  lactated ringers. 1000 milliLiter(s) IV Continuous <Continuous>  lactulose Syrup 5 Gram(s) Oral every 12 hours  polyethylene glycol 3350 17 Gram(s) Oral daily  senna 2 Tablet(s) Oral at bedtime    Genitourinary Medications    Hematologic/Oncologic Medications  apixaban 2.5 milliGRAM(s) Oral every 12 hours        Antimicrobial/Immunologic Medications    Endocrine/Metabolic Medications  hydrocortisone sodium succinate Injectable 50 milliGRAM(s) IV Push every 6 hours    Topical/Other Medications  povidone iodine 5% Nasal Swab 1 Application(s) Both Nostrils once  Vumerity 231mg DR capsule 2 Capsule(s) 2 Capsule(s) Oral two times a day    --------------------------------------------------------------------------------------  ICU Vital Signs Last 24 Hrs  T(C): 36.2 (06 Feb 2023 04:00), Max: 38.2 (05 Feb 2023 08:00)  T(F): 97.2 (06 Feb 2023 04:00), Max: 100.7 (05 Feb 2023 08:00)  HR: 93 (06 Feb 2023 06:00) (77 - 114)  BP: 103/64 (06 Feb 2023 06:00) (72/44 - 116/63)  BP(mean): 77 (06 Feb 2023 06:00) (53 - 85)  ABP: --  ABP(mean): --  RR: 16 (06 Feb 2023 06:00) (11 - 26)  SpO2: 96% (06 Feb 2023 06:00) (94% - 100%)    O2 Parameters below as of 06 Feb 2023 07:00  Patient On (Oxygen Delivery Method): room air      --------------------------------------------------------------------------------------  I&O's Detail    05 Feb 2023 07:01  -  06 Feb 2023 07:00  --------------------------------------------------------  IN:    IV PiggyBack: 1850 mL    Lactated Ringers: 2400 mL    Oral Fluid: 820 mL  Total IN: 5070 mL    OUT:    Voided (mL): 1000 mL  Total OUT: 1000 mL    Total NET: 4070 mL        --------------------------------------------------------------------------------------    EXAM  NEUROLOGY  Exam: Normal, in no acute distress.  Alert and oriented x4.  No focal neurologic deficits.     HEENT  Exam: Normocephalic, atraumatic, EOMI.     RESPIRATORY  Exam: Lungs clear to auscultation, Normal expansion/effort.    CARDIOVASCULAR  Exam: S1, S2.  Regular rate and rhythm.      GI/NUTRITION  Exam: Abdomen soft, Non-tender, Non-distended.     MUSCULOSKELETAL  Exam: Not moving her extremities.    METABOLIC / FLUIDS / ELECTROLYTES  lactated ringers. 1000 milliLiter(s) IV Continuous <Continuous>      HEMATOLOGIC  [x] VTE Prophylaxis: apixaban 2.5 milliGRAM(s) Oral every 12 hours    Transfusions:	[] PRBC	[] Platelets		[] FFP	[] Cryoprecipitate    INFECTIOUS DISEASE  Antimicrobials/Immunologic Medications:    Day #      of     ***    TUBES / LINES / DRAINS  ***  [x] Peripheral IV  [x] Necessity of urinary, arterial, and venous catheters discussed    --------------------------------------------------------------------------------------    CBC (02-06 @ 01:15)                              7.5<L>                         0.92<LL>  )----------------(  105<L>     --    % Neutrophils, --    % Lymphocytes, ANC: --                                  23.5<L>              CBC (02-05 @ 11:50)                              8.4<L>                         1.60<L>  )----------------(  111<L>     --    % Neutrophils, --    % Lymphocytes, ANC: --                                  26.4<L>                BMP (02-06 @ 01:15)             143     |  108<H>  |  9     		Ca++ --      Ca 8.5                ---------------------------------( 141<H>		Mg 1.60               3.1<L>  |  30      |  <0.20<L>			Ph 2.8     BMP (02-05 @ 01:25)             136     |  103     |  7     		Ca++ --      Ca 7.9<L>             ---------------------------------( 138<H>		Mg --                 3.8     |  29      |  0.27<L>			Ph --          Coags (02-05 @ 01:25)  aPTT 34.7 / INR 1.64<H> / PT 19.1<H>    ABG (02-05 @ 01:35)     7.39 / 47<H> / 138<H> / 28 / 3.1<H> / 99.0<H>%     Lactate:             ABG - ( 05 Feb 2023 01:35 )  pH, Arterial: 7.39  pH, Blood: x     /  pCO2: 47    /  pO2: 138   / HCO3: 28    / Base Excess: 3.1   /  SaO2: 99.0                --------------------------------------------------------------------------------------    OTHER LABORATORY:     IMAGING STUDIES:   CXR:    SICU Daily Progress Note  =====================================================  Interval/Overnight Events:   - 250cc Albumin bolus  - remains hypotensive (asx) SBP 70s  - started stress does steroids for possible adrenal insufficiency (pt took steroids 1/23)  - Acknowledged ortho rec for transfusion for Hb<8. Per SICU attending Dr. Bowens will transfuse if Hb<7     ALLERGIES:  No Known Allergies      --------------------------------------------------------------------------------------    MEDICATIONS:    Neurologic Medications  acetaminophen     Tablet .. 975 milliGRAM(s) Oral every 8 hours  ALPRAZolam 0.5 milliGRAM(s) Oral every 12 hours PRN anxiety  baclofen 5 milliGRAM(s) Oral every 8 hours PRN Muscle Spasm  escitalopram 20 milliGRAM(s) Oral daily  gabapentin 300 milliGRAM(s) Oral every 8 hours  lamoTRIgine 100 milliGRAM(s) Oral every 12 hours  melatonin 3 milliGRAM(s) Oral at bedtime PRN Insomnia  ondansetron Injectable 4 milliGRAM(s) IV Push every 6 hours PRN Nausea and/or Vomiting  oxyCODONE    IR 2.5 milliGRAM(s) Oral every 4 hours PRN Moderate Pain (4 - 6)  oxyCODONE    IR 5 milliGRAM(s) Oral every 4 hours PRN Severe Pain (7 - 10)  QUEtiapine 50 milliGRAM(s) Oral at bedtime  traZODone 100 milliGRAM(s) Oral at bedtime    Respiratory Medications    Cardiovascular Medications    Gastrointestinal Medications  bisacodyl Suppository 10 milliGRAM(s) Rectal daily PRN If no bowel movement by POD#2  lactated ringers. 1000 milliLiter(s) IV Continuous <Continuous>  lactulose Syrup 5 Gram(s) Oral every 12 hours  polyethylene glycol 3350 17 Gram(s) Oral daily  senna 2 Tablet(s) Oral at bedtime    Genitourinary Medications    Hematologic/Oncologic Medications  apixaban 2.5 milliGRAM(s) Oral every 12 hours        Antimicrobial/Immunologic Medications    Endocrine/Metabolic Medications  hydrocortisone sodium succinate Injectable 50 milliGRAM(s) IV Push every 6 hours    Topical/Other Medications  povidone iodine 5% Nasal Swab 1 Application(s) Both Nostrils once  Vumerity 231mg DR capsule 2 Capsule(s) 2 Capsule(s) Oral two times a day    --------------------------------------------------------------------------------------  ICU Vital Signs Last 24 Hrs  T(C): 36.2 (06 Feb 2023 04:00), Max: 38.2 (05 Feb 2023 08:00)  T(F): 97.2 (06 Feb 2023 04:00), Max: 100.7 (05 Feb 2023 08:00)  HR: 93 (06 Feb 2023 06:00) (77 - 114)  BP: 103/64 (06 Feb 2023 06:00) (72/44 - 116/63)  BP(mean): 77 (06 Feb 2023 06:00) (53 - 85)  ABP: --  ABP(mean): --  RR: 16 (06 Feb 2023 06:00) (11 - 26)  SpO2: 96% (06 Feb 2023 06:00) (94% - 100%)    O2 Parameters below as of 06 Feb 2023 07:00  Patient On (Oxygen Delivery Method): room air      --------------------------------------------------------------------------------------  I&O's Detail    05 Feb 2023 07:01  - 06 Feb 2023 07:00  --------------------------------------------------------  IN:    IV PiggyBack: 1850 mL    Lactated Ringers: 2400 mL    Oral Fluid: 820 mL  Total IN: 5070 mL    OUT:    Voided (mL): 1000 mL  Total OUT: 1000 mL    Total NET: 4070 mL        --------------------------------------------------------------------------------------    EXAM  NEUROLOGY  Exam: Normal, in no acute distress.  Alert and oriented x4.  No focal neurologic deficits.     HEENT  Exam: Normocephalic, atraumatic, EOMI.     RESPIRATORY  Exam: Lungs clear to auscultation, Normal expansion/effort.    CARDIOVASCULAR  Exam: S1, S2.  Regular rate and rhythm.      GI/NUTRITION  Exam: Abdomen soft, Non-tender, Non-distended.     MUSCULOSKELETAL  Exam: Not moving her extremities.    METABOLIC / FLUIDS / ELECTROLYTES  lactated ringers. 1000 milliLiter(s) IV Continuous <Continuous>      HEMATOLOGIC  [x] VTE Prophylaxis: apixaban 2.5 milliGRAM(s) Oral every 12 hours    Transfusions:	[] PRBC	[] Platelets		[] FFP	[] Cryoprecipitate    INFECTIOUS DISEASE  Antimicrobials/Immunologic Medications:    Day #      of     ***    TUBES / LINES / DRAINS  ***  [x] Peripheral IV  [x] Necessity of urinary, arterial, and venous catheters discussed    --------------------------------------------------------------------------------------    CBC (02-06 @ 01:15)                              7.5<L>                         0.92<LL>  )----------------(  105<L>     --    % Neutrophils, --    % Lymphocytes, ANC: --                                  23.5<L>              CBC (02-05 @ 11:50)                              8.4<L>                         1.60<L>  )----------------(  111<L>     --    % Neutrophils, --    % Lymphocytes, ANC: --                                  26.4<L>                BMP (02-06 @ 01:15)             143     |  108<H>  |  9     		Ca++ --      Ca 8.5                ---------------------------------( 141<H>		Mg 1.60               3.1<L>  |  30      |  <0.20<L>			Ph 2.8     BMP (02-05 @ 01:25)             136     |  103     |  7     		Ca++ --      Ca 7.9<L>             ---------------------------------( 138<H>		Mg --                 3.8     |  29      |  0.27<L>			Ph --          Coags (02-05 @ 01:25)  aPTT 34.7 / INR 1.64<H> / PT 19.1<H>    ABG (02-05 @ 01:35)     7.39 / 47<H> / 138<H> / 28 / 3.1<H> / 99.0<H>%     Lactate:             ABG - ( 05 Feb 2023 01:35 )  pH, Arterial: 7.39  pH, Blood: x     /  pCO2: 47    /  pO2: 138   / HCO3: 28    / Base Excess: 3.1   /  SaO2: 99.0

## 2023-02-06 NOTE — DIETITIAN INITIAL EVALUATION ADULT - NSFNSGIIOFT_GEN_A_CORE
"Subjective:      Roselia Morrow is a 15 y.o. female who presents with Annual Exam        Annual Exam     15 y.o. female comes in for a sports physical. No major medical history, no chronic conditions, no chronic medications. No history of asthma, heart disease, seizure disorder or syncopal episodes with activity. Please see the chart for further information, however cleared for sports without restrictions.    Review of Systems   Constitutional: Negative.    HENT: Negative.    Eyes: Negative.    Respiratory: Negative.    Cardiovascular: Negative.    Gastrointestinal: Negative.    Genitourinary: Negative.    Musculoskeletal: Negative.    Skin: Negative.    Neurological: Negative.    Endo/Heme/Allergies: Negative.    Psychiatric/Behavioral: Negative.        PMH:  has no past medical history on file.  MEDS:   Current Outpatient Prescriptions:   •  Multiple Vitamins-Minerals (ADINA-MIN PO), Take  by mouth., Disp: , Rfl:   ALLERGIES: No Known Allergies  SURGHX:   Past Surgical History:   Procedure Laterality Date   • TONSILLECTOMY  3/23/2010    Performed by LORIN LÓPEZ at SURGERY SAME DAY [facility] ORS     SOCHX:  reports that she has never smoked. She has never used smokeless tobacco.  FH: Family history was reviewed, no pertinent findings to report   Objective:     /66   Pulse 85   Temp 37.1 °C (98.7 °F) (Temporal)   Resp 20   Ht 1.5 m (4' 11.06\")   Wt 39.5 kg (87 lb)   SpO2 100%   BMI 17.54 kg/m²      Physical Exam      See scanned sheets       Assessment/Plan:     1. Routine sports examination           -cleared for sports without restriction       Leni Gonzalez P.A.-C.        "
WNL

## 2023-02-06 NOTE — DIETITIAN INITIAL EVALUATION ADULT - PERTINENT LABORATORY DATA
02-06    143  |  108<H>  |  9   ----------------------------<  141<H>  3.1<L>   |  30  |  <0.20<L>    Ca    8.5      06 Feb 2023 01:15  Phos  2.8     02-06  Mg     1.60     02-06

## 2023-02-07 LAB
ANION GAP SERPL CALC-SCNC: 7 MMOL/L — SIGNIFICANT CHANGE UP (ref 7–14)
BASOPHILS # BLD AUTO: 0 K/UL — SIGNIFICANT CHANGE UP (ref 0–0.2)
BASOPHILS NFR BLD AUTO: 0 % — SIGNIFICANT CHANGE UP (ref 0–2)
BUN SERPL-MCNC: 11 MG/DL — SIGNIFICANT CHANGE UP (ref 7–23)
CALCIUM SERPL-MCNC: 8.5 MG/DL — SIGNIFICANT CHANGE UP (ref 8.4–10.5)
CHLORIDE SERPL-SCNC: 109 MMOL/L — HIGH (ref 98–107)
CO2 SERPL-SCNC: 26 MMOL/L — SIGNIFICANT CHANGE UP (ref 22–31)
CREAT SERPL-MCNC: 0.28 MG/DL — LOW (ref 0.5–1.3)
EGFR: 122 ML/MIN/1.73M2 — SIGNIFICANT CHANGE UP
EOSINOPHIL # BLD AUTO: 0 K/UL — SIGNIFICANT CHANGE UP (ref 0–0.5)
EOSINOPHIL NFR BLD AUTO: 0 % — SIGNIFICANT CHANGE UP (ref 0–6)
GLUCOSE SERPL-MCNC: 152 MG/DL — HIGH (ref 70–99)
HCT VFR BLD CALC: 23.8 % — LOW (ref 34.5–45)
HGB BLD-MCNC: 7.4 G/DL — LOW (ref 11.5–15.5)
IANC: 0.63 K/UL — LOW (ref 1.8–7.4)
IMM GRANULOCYTES NFR BLD AUTO: 0 % — SIGNIFICANT CHANGE UP (ref 0–0.9)
LYMPHOCYTES # BLD AUTO: 0.23 K/UL — LOW (ref 1–3.3)
LYMPHOCYTES # BLD AUTO: 19.2 % — SIGNIFICANT CHANGE UP (ref 13–44)
MAGNESIUM SERPL-MCNC: 2 MG/DL — SIGNIFICANT CHANGE UP (ref 1.6–2.6)
MCHC RBC-ENTMCNC: 28.1 PG — SIGNIFICANT CHANGE UP (ref 27–34)
MCHC RBC-ENTMCNC: 31.1 GM/DL — LOW (ref 32–36)
MCV RBC AUTO: 90.5 FL — SIGNIFICANT CHANGE UP (ref 80–100)
MONOCYTES # BLD AUTO: 0.34 K/UL — SIGNIFICANT CHANGE UP (ref 0–0.9)
MONOCYTES NFR BLD AUTO: 28.3 % — HIGH (ref 2–14)
NEUTROPHILS # BLD AUTO: 0.63 K/UL — LOW (ref 1.8–7.4)
NEUTROPHILS NFR BLD AUTO: 52.5 % — SIGNIFICANT CHANGE UP (ref 43–77)
NRBC # BLD: 0 /100 WBCS — SIGNIFICANT CHANGE UP (ref 0–0)
NRBC # FLD: 0 K/UL — SIGNIFICANT CHANGE UP (ref 0–0)
PHOSPHATE SERPL-MCNC: 2.2 MG/DL — LOW (ref 2.5–4.5)
PLATELET # BLD AUTO: 149 K/UL — LOW (ref 150–400)
POTASSIUM SERPL-MCNC: 4.6 MMOL/L — SIGNIFICANT CHANGE UP (ref 3.5–5.3)
POTASSIUM SERPL-SCNC: 4.6 MMOL/L — SIGNIFICANT CHANGE UP (ref 3.5–5.3)
RBC # BLD: 2.63 M/UL — LOW (ref 3.8–5.2)
RBC # FLD: 16 % — HIGH (ref 10.3–14.5)
SODIUM SERPL-SCNC: 142 MMOL/L — SIGNIFICANT CHANGE UP (ref 135–145)
WBC # BLD: 1.23 K/UL — LOW (ref 3.8–10.5)
WBC # FLD AUTO: 1.23 K/UL — LOW (ref 3.8–10.5)

## 2023-02-07 PROCEDURE — 99233 SBSQ HOSP IP/OBS HIGH 50: CPT

## 2023-02-07 RX ORDER — ACETAMINOPHEN 500 MG
975 TABLET ORAL EVERY 6 HOURS
Refills: 0 | Status: DISCONTINUED | OUTPATIENT
Start: 2023-02-07 | End: 2023-02-14

## 2023-02-07 RX ORDER — POTASSIUM PHOSPHATE, MONOBASIC POTASSIUM PHOSPHATE, DIBASIC 236; 224 MG/ML; MG/ML
15 INJECTION, SOLUTION INTRAVENOUS ONCE
Refills: 0 | Status: COMPLETED | OUTPATIENT
Start: 2023-02-07 | End: 2023-02-07

## 2023-02-07 RX ORDER — FILGRASTIM 480MCG/1.6
300 VIAL (ML) INJECTION ONCE
Refills: 0 | Status: COMPLETED | OUTPATIENT
Start: 2023-02-07 | End: 2023-02-07

## 2023-02-07 RX ORDER — CEFAZOLIN SODIUM 1 G
2000 VIAL (EA) INJECTION EVERY 8 HOURS
Refills: 0 | Status: DISCONTINUED | OUTPATIENT
Start: 2023-02-07 | End: 2023-02-08

## 2023-02-07 RX ADMIN — Medication 975 MILLIGRAM(S): at 12:18

## 2023-02-07 RX ADMIN — QUETIAPINE FUMARATE 50 MILLIGRAM(S): 200 TABLET, FILM COATED ORAL at 22:35

## 2023-02-07 RX ADMIN — OXYCODONE HYDROCHLORIDE 5 MILLIGRAM(S): 5 TABLET ORAL at 21:00

## 2023-02-07 RX ADMIN — ESCITALOPRAM OXALATE 20 MILLIGRAM(S): 10 TABLET, FILM COATED ORAL at 12:19

## 2023-02-07 RX ADMIN — GABAPENTIN 300 MILLIGRAM(S): 400 CAPSULE ORAL at 12:19

## 2023-02-07 RX ADMIN — LACTULOSE 5 GRAM(S): 10 SOLUTION ORAL at 17:45

## 2023-02-07 RX ADMIN — Medication 24 MILLIGRAM(S): at 12:35

## 2023-02-07 RX ADMIN — APIXABAN 2.5 MILLIGRAM(S): 2.5 TABLET, FILM COATED ORAL at 05:53

## 2023-02-07 RX ADMIN — LAMOTRIGINE 100 MILLIGRAM(S): 25 TABLET, ORALLY DISINTEGRATING ORAL at 21:36

## 2023-02-07 RX ADMIN — Medication 100 MILLIGRAM(S): at 21:31

## 2023-02-07 RX ADMIN — Medication 5 MILLIGRAM(S): at 12:34

## 2023-02-07 RX ADMIN — Medication 100 MILLIGRAM(S): at 22:35

## 2023-02-07 RX ADMIN — OXYCODONE HYDROCHLORIDE 5 MILLIGRAM(S): 5 TABLET ORAL at 20:29

## 2023-02-07 RX ADMIN — Medication 975 MILLIGRAM(S): at 12:58

## 2023-02-07 RX ADMIN — POTASSIUM PHOSPHATE, MONOBASIC POTASSIUM PHOSPHATE, DIBASIC 62.5 MILLIMOLE(S): 236; 224 INJECTION, SOLUTION INTRAVENOUS at 04:18

## 2023-02-07 RX ADMIN — Medication 975 MILLIGRAM(S): at 18:00

## 2023-02-07 RX ADMIN — GABAPENTIN 300 MILLIGRAM(S): 400 CAPSULE ORAL at 21:35

## 2023-02-07 RX ADMIN — GABAPENTIN 300 MILLIGRAM(S): 400 CAPSULE ORAL at 05:53

## 2023-02-07 RX ADMIN — APIXABAN 2.5 MILLIGRAM(S): 2.5 TABLET, FILM COATED ORAL at 17:45

## 2023-02-07 RX ADMIN — Medication 975 MILLIGRAM(S): at 23:45

## 2023-02-07 RX ADMIN — Medication 50 MILLIGRAM(S): at 05:53

## 2023-02-07 RX ADMIN — LAMOTRIGINE 100 MILLIGRAM(S): 25 TABLET, ORALLY DISINTEGRATING ORAL at 10:40

## 2023-02-07 RX ADMIN — OXYCODONE HYDROCHLORIDE 5 MILLIGRAM(S): 5 TABLET ORAL at 12:58

## 2023-02-07 RX ADMIN — Medication 975 MILLIGRAM(S): at 06:38

## 2023-02-07 RX ADMIN — Medication 975 MILLIGRAM(S): at 05:53

## 2023-02-07 RX ADMIN — Medication 975 MILLIGRAM(S): at 18:30

## 2023-02-07 RX ADMIN — OXYCODONE HYDROCHLORIDE 5 MILLIGRAM(S): 5 TABLET ORAL at 12:33

## 2023-02-07 RX ADMIN — Medication 975 MILLIGRAM(S): at 23:00

## 2023-02-07 NOTE — PROGRESS NOTE ADULT - SUBJECTIVE AND OBJECTIVE BOX
SICU Daily Progress Note  =====================================================  Interval/Overnight Events:   - NAEON    ALLERGIES:  No Known Allergies      --------------------------------------------------------------------------------------    MEDICATIONS:    Neurologic Medications  acetaminophen     Tablet .. 975 milliGRAM(s) Oral every 8 hours  ALPRAZolam 0.5 milliGRAM(s) Oral every 12 hours PRN anxiety  baclofen 5 milliGRAM(s) Oral every 8 hours PRN Muscle Spasm  escitalopram 20 milliGRAM(s) Oral daily  gabapentin 300 milliGRAM(s) Oral every 8 hours  lamoTRIgine 100 milliGRAM(s) Oral every 12 hours  melatonin 3 milliGRAM(s) Oral at bedtime PRN Insomnia  ondansetron Injectable 4 milliGRAM(s) IV Push every 6 hours PRN Nausea and/or Vomiting  oxyCODONE    IR 2.5 milliGRAM(s) Oral every 4 hours PRN Moderate Pain (4 - 6)  oxyCODONE    IR 5 milliGRAM(s) Oral every 4 hours PRN Severe Pain (7 - 10)  QUEtiapine 50 milliGRAM(s) Oral at bedtime  traZODone 100 milliGRAM(s) Oral at bedtime    Respiratory Medications    Cardiovascular Medications    Gastrointestinal Medications  bisacodyl Suppository 10 milliGRAM(s) Rectal daily PRN If no bowel movement by POD#2  lactated ringers. 1000 milliLiter(s) IV Continuous <Continuous>  lactulose Syrup 5 Gram(s) Oral every 12 hours  polyethylene glycol 3350 17 Gram(s) Oral daily  senna 2 Tablet(s) Oral at bedtime    Genitourinary Medications    Hematologic/Oncologic Medications  apixaban 2.5 milliGRAM(s) Oral every 12 hours        Antimicrobial/Immunologic Medications    Endocrine/Metabolic Medications  hydrocortisone sodium succinate Injectable 50 milliGRAM(s) IV Push every 6 hours    Topical/Other Medications  povidone iodine 5% Nasal Swab 1 Application(s) Both Nostrils once  Vumerity 231mg DR capsule 2 Capsule(s) 2 Capsule(s) Oral two times a day    --------------------------------------------------------------------------------------  ICU Vital Signs Last 24 Hrs  T(C): 36.7 (07 Feb 2023 00:00), Max: 36.8 (06 Feb 2023 20:00)  T(F): 98 (07 Feb 2023 00:00), Max: 98.3 (06 Feb 2023 20:00)  HR: 83 (07 Feb 2023 00:00) (77 - 104)  BP: 88/55 (07 Feb 2023 00:00) (84/54 - 114/73)  BP(mean): 66 (07 Feb 2023 00:00) (65 - 85)  ABP: --  ABP(mean): --  RR: 11 (07 Feb 2023 00:00) (11 - 28)  SpO2: 94% (07 Feb 2023 00:00) (93% - 97%)    O2 Parameters below as of 07 Feb 2023 00:00  Patient On (Oxygen Delivery Method): room air            --------------------------------------------------------------------------------------  I&O's Detail    05 Feb 2023 07:01  -  06 Feb 2023 07:00  --------------------------------------------------------  IN:    IV PiggyBack: 1850 mL    Lactated Ringers: 2400 mL    Oral Fluid: 820 mL  Total IN: 5070 mL    OUT:    Voided (mL): 1000 mL  Total OUT: 1000 mL    Total NET: 4070 mL        --------------------------------------------------------------------------------------    EXAM  NEUROLOGY  Exam: Normal, in no acute distress.  Alert and oriented x4.  No focal neurologic deficits.     HEENT  Exam: Normocephalic, atraumatic, EOMI.     RESPIRATORY  Exam: Lungs clear to auscultation, Normal expansion/effort.    CARDIOVASCULAR  Exam: S1, S2.  Regular rate and rhythm.      GI/NUTRITION  Exam: Abdomen soft, Non-tender, Non-distended.     MUSCULOSKELETAL  Exam: Not moving her extremities.    METABOLIC / FLUIDS / ELECTROLYTES  lactated ringers. 1000 milliLiter(s) IV Continuous <Continuous>      HEMATOLOGIC  [x] VTE Prophylaxis: apixaban 2.5 milliGRAM(s) Oral every 12 hours    Transfusions:	[] PRBC	[] Platelets		[] FFP	[] Cryoprecipitate      CBC:            7.5    0.92  )-----------( 105      ( 02-06-23 @ 01:15 )             23.5         Chem:         ( 02-06-23 @ 01:15 )    143  |  108<H>  |  9   ----------------------------<  141<H>  3.1<L>   |  30  |  <0.20<L>        Liver Functions:     Type & Screen: ( 02-05-23 @ 01:44 )    ABO/Rh/Mary Jo:  A Positive           TUBES / LINES / DRAINS  ***  [x] Peripheral IV  [x] Necessity of urinary, arterial, and venous catheters discussed    --------------------------------------------------------------------------------------         SICU Daily Progress Note  =====================================================  Interval/Overnight Events:   Interval/Overnight Events:   - GCSF (for low WBC)  - Medrol Pack started (transition IV steroids to PO)   - if febrile-> start vanc     ALLERGIES:  No Known Allergies      --------------------------------------------------------------------------------------    MEDICATIONS:    Neurologic Medications  acetaminophen     Tablet .. 975 milliGRAM(s) Oral every 8 hours  ALPRAZolam 0.5 milliGRAM(s) Oral every 12 hours PRN anxiety  baclofen 5 milliGRAM(s) Oral every 8 hours PRN Muscle Spasm  escitalopram 20 milliGRAM(s) Oral daily  gabapentin 300 milliGRAM(s) Oral every 8 hours  lamoTRIgine 100 milliGRAM(s) Oral every 12 hours  melatonin 3 milliGRAM(s) Oral at bedtime PRN Insomnia  ondansetron Injectable 4 milliGRAM(s) IV Push every 6 hours PRN Nausea and/or Vomiting  oxyCODONE    IR 2.5 milliGRAM(s) Oral every 4 hours PRN Moderate Pain (4 - 6)  oxyCODONE    IR 5 milliGRAM(s) Oral every 4 hours PRN Severe Pain (7 - 10)  QUEtiapine 50 milliGRAM(s) Oral at bedtime  traZODone 100 milliGRAM(s) Oral at bedtime    Respiratory Medications    Cardiovascular Medications    Gastrointestinal Medications  bisacodyl Suppository 10 milliGRAM(s) Rectal daily PRN If no bowel movement by POD#2  lactated ringers. 1000 milliLiter(s) IV Continuous <Continuous>  lactulose Syrup 5 Gram(s) Oral every 12 hours  polyethylene glycol 3350 17 Gram(s) Oral daily  senna 2 Tablet(s) Oral at bedtime    Genitourinary Medications    Hematologic/Oncologic Medications  apixaban 2.5 milliGRAM(s) Oral every 12 hours        Antimicrobial/Immunologic Medications    Endocrine/Metabolic Medications  hydrocortisone sodium succinate Injectable 50 milliGRAM(s) IV Push every 6 hours    Topical/Other Medications  povidone iodine 5% Nasal Swab 1 Application(s) Both Nostrils once  Vumerity 231mg DR capsule 2 Capsule(s) 2 Capsule(s) Oral two times a day    --------------------------------------------------------------------------------------  ICU Vital Signs Last 24 Hrs  T(C): 36.7 (07 Feb 2023 00:00), Max: 36.8 (06 Feb 2023 20:00)  T(F): 98 (07 Feb 2023 00:00), Max: 98.3 (06 Feb 2023 20:00)  HR: 83 (07 Feb 2023 00:00) (77 - 104)  BP: 88/55 (07 Feb 2023 00:00) (84/54 - 114/73)  BP(mean): 66 (07 Feb 2023 00:00) (65 - 85)  ABP: --  ABP(mean): --  RR: 11 (07 Feb 2023 00:00) (11 - 28)  SpO2: 94% (07 Feb 2023 00:00) (93% - 97%)    O2 Parameters below as of 07 Feb 2023 00:00  Patient On (Oxygen Delivery Method): room air            --------------------------------------------------------------------------------------  I&O's Detail    06 Feb 2023 07:01  -  07 Feb 2023 07:00  --------------------------------------------------------  IN:    IV PiggyBack: 350 mL    Lactated Ringers: 2400 mL    Oral Fluid: 760 mL  Total IN: 3510 mL    OUT:    Voided (mL): 750 mL  Total OUT: 750 mL    Total NET: 2760 mL      07 Feb 2023 07:01  -  07 Feb 2023 13:13  --------------------------------------------------------  IN:    Lactated Ringers: 200 mL    Oral Fluid: 200 mL  Total IN: 400 mL    OUT:  Total OUT: 0 mL    Total NET: 400 mL            --------------------------------------------------------------------------------------    EXAM  NEUROLOGY  Exam: Normal, in no acute distress.  Alert and oriented x4.  No focal neurologic deficits.     HEENT  Exam: Normocephalic, atraumatic, EOMI.     RESPIRATORY  Exam: Lungs clear to auscultation, Normal expansion/effort.    CARDIOVASCULAR  Exam: S1, S2.  Regular rate and rhythm.      GI/NUTRITION  Exam: Abdomen soft, Non-tender, Non-distended.     MUSCULOSKELETAL  Exam: Not moving her extremities.    METABOLIC / FLUIDS / ELECTROLYTES  lactated ringers. 1000 milliLiter(s) IV Continuous <Continuous>      HEMATOLOGIC  [x] VTE Prophylaxis: apixaban 2.5 milliGRAM(s) Oral every 12 hours    Transfusions:	[] PRBC	[] Platelets		[] FFP	[] Cryoprecipitate    Liver Functions:     Type & Screen: ( 02-05-23 @ 01:44 )    ABO/Rh/Mary Jo:  A Positive       CBC (02-07 @ 01:30)                              7.4<L>                         1.23<L>  )----------------(  149<L>     --    % Neutrophils, --    % Lymphocytes, ANC: --                                  23.8<L>              CBC (02-06 @ 01:15)                              7.5<L>                         0.92<LL>  )----------------(  105<L>     38.4<L>% Neutrophils, 19.8  % Lymphocytes, ANC: 0.35<L>                              23.5<L>                BMP (02-07 @ 01:30)             142     |  109<H>  |  11    		Ca++ --      Ca 8.5                ---------------------------------( 152<H>		Mg 2.00               4.6     |  26      |  0.28<L>			Ph 2.2<L>  BMP (02-06 @ 01:15)             143     |  108<H>  |  9     		Ca++ --      Ca 8.5                ---------------------------------( 141<H>		Mg 1.60               3.1<L>  |  30      |  <0.20<L>			Ph 2.8               -> .Blood Blood-Peripheral Culture (02-05 @ 01:45)     NG    NG    No growth to date.            TUBES / LINES / DRAINS  ***  [x] Peripheral IV  [x] Necessity of urinary, arterial, and venous catheters discussed    --------------------------------------------------------------------------------------

## 2023-02-07 NOTE — PROGRESS NOTE ADULT - ASSESSMENT
62 y/o female with PMHx of MS (bedbound) now s/p Right Hemiarthroplasty on 2/2/23. Rapid response was called because patient became hypotensive and desaturated to 80s on surgical floor. Blood pressure slightly responsive to fluid, satting well on NC. CTA done to r/o PE (negative). SICU consulted for hemodynamic monitoring.        PLAN:   Neurologic:   - hx of MS  - q8hr neurovascular checks s/p surgery  - pain control- standing Tylenol, Oxy PRN  - continue home meds (gabapentin, lexapro, vumerity, lamotrigine, seroquel)     Respiratory:  - RA  - maintain SPO> 92%    Cardiovascular:   - MAP >65   - monitor hemodynamics  - s/p 250cc albumin overnight    Gastrointestinal/Nutrition:   - Diet: Regular   - bowel regimen  - Zofran prn for n/v     Renal/Genitourinary:   - LR @100cc/hr  - strict I&Os  - monitor electrolytes, replete prn    Hematologic:   - DVT ppx: Eliquis 2.5 bid   - monitor H/H   - transfusion if Hb<7    Infectious Disease:   - Afebrile overnight  - f/u BCx (2/5)  - hold off on abx for now  - monitor WBC    Endocrine:  - Glucose wnl on ISS  - Continue hydrocortisone 50mg q6h    Lines/Tubes:   -PIV     DISPO: SICU     62 y/o female with PMHx of MS (bedbound) now s/p Right Hemiarthroplasty on 2/2/23. Rapid response was called because patient became hypotensive and desaturated to 80s on surgical floor. Blood pressure slightly responsive to fluid, satting well on NC. CTA done to r/o PE (negative). SICU consulted for hemodynamic monitoring.      PLAN:   Neurologic:   - hx of MS  - q8hr neurovascular checks s/p surgery  - pain control- standing Tylenol, Oxy PRN  - continue home meds (gabapentin, lexapro, vumerity, lamotrigine, seroquel)     Respiratory:  - RA  - maintain SPO> 92%    Cardiovascular:   - MAP >65   - monitor hemodynamics    Gastrointestinal/Nutrition:   - Diet: Regular   - bowel regimen  - Zofran prn for n/v     Renal/Genitourinary:   - LR @100cc/hr  - strict I&Os  - monitor electrolytes, replete prn    Hematologic:   - DVT ppx: Eliquis 2.5 bid   - monitor H/H   - transfusion if Hb<7    Infectious Disease:   - Afebrile overnight  - BCx (2/5): NGTD  - hold off on abx for now  - monitor WBC  - Neulasta x1 for leukopenia  - If febrile will initiate Vanc    Endocrine:  - ISS  - Medrol surinder    Lines/Tubes:   -PIV     DISPO: SICU

## 2023-02-07 NOTE — PROGRESS NOTE ADULT - ATTENDING COMMENTS
I agree with the detailed interval history, physical, and plan, which I have reviewed and edited where appropriate'; also agree with notes/assessment with my team on service.  I have personally examined the patient.  I was physically present for the key portions of the evaluation and management (E/M) service provided.  I reviewed all the pertinent data.  The patient is a critical care patient with life threatening hemodynamic and metabolic instability in SICU.  The SICU team has a constant risk benefit analyzes discussion and coordinating care with the primary team and all consultants.   The patient is in SICU with the chief complaint and diagnosis mentioned in the note.   The plan will be specified in the note.  64 y/o female with PMHx of MS; s/p Right Hemiarthroplasty sp hypotensive and desaturation episodes in SICU consulted for hemodynamic monitoring.    EXAM  NEUROLOGY  Exam:  No focal neurologic deficits.   RESPIRATORY  Exam: Lungs clear   CARDIOVASCULAR  Exam:  Regular rate   GI/NUTRITION  Exam: Abdomen soft, Non-tender,   MUSCULOSKELETAL  Exam: Not moving her extremities.    PLAN:   Neurologic:  MS  - q8hr neurovascular checks   - Tylenol, Oxy PRN  -gabapentin, lexapro, vumerity, lamotrigine, seroquel   Respiratory:  - RA  Cardiovascular:   - MAP >65   Gastrointestinal/Nutrition:   - Diet: Regular   - Zofran   Renal/Genitourinary:   - LR @100cc/hr  Hematologic:   - DVT ppx: Eliquis 2.5 bid   Infectious Disease:   - hold off on abx for now  Endocrine: adrenal insufficiency  - ISS  - Medrol surinder    DISPO: SICU I agree with the detailed interval history, physical, and plan, which I have reviewed and edited where appropriate'; also agree with notes/assessment with my team on service.  I have personally examined the patient.  I was physically present for the key portions of the evaluation and management (E/M) service provided.  I reviewed all the pertinent data.  The patient is a critical care patient with life threatening hemodynamic and metabolic instability in SICU.  The SICU team has a constant risk benefit analyzes discussion and coordinating care with the primary team and all consultants.   The patient is in SICU with the chief complaint and diagnosis mentioned in the note.   The plan will be specified in the note.  64 y/o female with PMHx of MS; s/p Right Hemiarthroplasty sp hypotensive and desaturation episodes in SICU consulted for hemodynamic monitoring.    EXAM  NEUROLOGY  Exam:  No focal neurologic deficits.   RESPIRATORY  Exam: Lungs clear   CARDIOVASCULAR  Exam:  Regular rate   GI/NUTRITION  Exam: Abdomen soft, Non-tender,   MUSCULOSKELETAL  Exam: Not moving her extremities.    PLAN:   Neurologic:  MS  - q8hr neurovascular checks   - Tylenol, Oxy PRN  -gabapentin, lexapro, vumerity, lamotrigine, seroquel   Respiratory:  - RA  Cardiovascular:   - MAP >65   Gastrointestinal/Nutrition:   - Diet: Regular   - Zofran   Renal/Genitourinary:   - LR @100cc/hr  Hematologic:   - DVT ppx: Eliquis 2.5 bid   Infectious Disease:   - hold off on abx for now  Endocrine: adrenal insufficiency  - ISS  - Medrol surinder    DISPO: SICU    ADDENDUM  Patient admitted to SICU on 2/5 due to post operative hypotension due to hypovolemic shock and acute hypoxic respiratory failure with improvement after 5L O2 and aggressive fluid resuscitation, patient transferred to SICU for hemodynamic monitoring.

## 2023-02-07 NOTE — PROGRESS NOTE ADULT - SUBJECTIVE AND OBJECTIVE BOX
Ortho Progress Note    S: Patient seen and examined. No acute events overnight. Pain well controlled with current regimen.     O:    Vital Signs Last 24 Hrs  T(C): 36.2 (07 Feb 2023 04:00), Max: 36.8 (06 Feb 2023 20:00)  T(F): 97.1 (07 Feb 2023 04:00), Max: 98.3 (06 Feb 2023 20:00)  HR: 84 (07 Feb 2023 06:00) (73 - 104)  BP: 122/80 (07 Feb 2023 06:00) (84/54 - 122/80)  BP(mean): 92 (07 Feb 2023 06:00) (65 - 92)  RR: 16 (07 Feb 2023 06:00) (11 - 28)  SpO2: 96% (07 Feb 2023 06:00) (93% - 96%)    Parameters below as of 07 Feb 2023 06:00  Patient On (Oxygen Delivery Method): room air      Physical Exam:  Gen: Laying in bed, NAD, alert and oriented.   Resp: Unlabored breathing  Ext: RLE- dressing c/d/i         Motor exam consistent with baseline foot drop          + SILT DP/SP/ISAAC/Sa/T          +DP, extremity WWP                                7.5    0.92  )-----------( 105      ( 06 Feb 2023 01:15 )             23.5                         8.4    1.60  )-----------( 111      ( 05 Feb 2023 11:50 )             26.4       02-06    143  |  108<H>  |  9   ----------------------------<  141<H>  3.1<L>   |  30  |  <0.20<L>        PT/INR - ( 05 Feb 2023 01:25 )   PT: 19.1 sec;   INR: 1.64 ratio         PTT - ( 05 Feb 2023 01:25 )  PTT:34.7 sec

## 2023-02-07 NOTE — PROGRESS NOTE ADULT - ASSESSMENT
ASSESSMENT/PLAN:   ALEXY STRATTON is a 63yFemale s/p R hip hemiarthroplasty, now POD3    - Pain control  - WBAT RLE  - PT/OT/OOB  - DVT ppx: Eliquis  - Orthopedics team recommending transfusion pRBC for Hgb < 8  - Appreciate care per SICU  - Dispo: pending PT eval      Orthopaedic Surgery  [facility] [id number]  [facility]        [id number]  [facility]  [id number]/ [phone]

## 2023-02-08 LAB
ANION GAP SERPL CALC-SCNC: 6 MMOL/L — LOW (ref 7–14)
BASOPHILS # BLD AUTO: 0 K/UL — SIGNIFICANT CHANGE UP (ref 0–0.2)
BASOPHILS NFR BLD AUTO: 0 % — SIGNIFICANT CHANGE UP (ref 0–2)
BUN SERPL-MCNC: 13 MG/DL — SIGNIFICANT CHANGE UP (ref 7–23)
CALCIUM SERPL-MCNC: 8.5 MG/DL — SIGNIFICANT CHANGE UP (ref 8.4–10.5)
CHLORIDE SERPL-SCNC: 106 MMOL/L — SIGNIFICANT CHANGE UP (ref 98–107)
CO2 SERPL-SCNC: 27 MMOL/L — SIGNIFICANT CHANGE UP (ref 22–31)
CREAT SERPL-MCNC: 0.28 MG/DL — LOW (ref 0.5–1.3)
EGFR: 122 ML/MIN/1.73M2 — SIGNIFICANT CHANGE UP
EOSINOPHIL # BLD AUTO: 0.01 K/UL — SIGNIFICANT CHANGE UP (ref 0–0.5)
EOSINOPHIL NFR BLD AUTO: 0.5 % — SIGNIFICANT CHANGE UP (ref 0–6)
GLUCOSE SERPL-MCNC: 141 MG/DL — HIGH (ref 70–99)
HCT VFR BLD CALC: 25.1 % — LOW (ref 34.5–45)
HGB BLD-MCNC: 7.9 G/DL — LOW (ref 11.5–15.5)
IANC: 1 K/UL — LOW (ref 1.8–7.4)
IMM GRANULOCYTES NFR BLD AUTO: 2 % — HIGH (ref 0–0.9)
LYMPHOCYTES # BLD AUTO: 0.59 K/UL — LOW (ref 1–3.3)
LYMPHOCYTES # BLD AUTO: 28.8 % — SIGNIFICANT CHANGE UP (ref 13–44)
MAGNESIUM SERPL-MCNC: 1.8 MG/DL — SIGNIFICANT CHANGE UP (ref 1.6–2.6)
MCHC RBC-ENTMCNC: 28.7 PG — SIGNIFICANT CHANGE UP (ref 27–34)
MCHC RBC-ENTMCNC: 31.5 GM/DL — LOW (ref 32–36)
MCV RBC AUTO: 91.3 FL — SIGNIFICANT CHANGE UP (ref 80–100)
MONOCYTES # BLD AUTO: 0.41 K/UL — SIGNIFICANT CHANGE UP (ref 0–0.9)
MONOCYTES NFR BLD AUTO: 20 % — HIGH (ref 2–14)
NEUTROPHILS # BLD AUTO: 1 K/UL — LOW (ref 1.8–7.4)
NEUTROPHILS NFR BLD AUTO: 48.7 % — SIGNIFICANT CHANGE UP (ref 43–77)
NRBC # BLD: 0 /100 WBCS — SIGNIFICANT CHANGE UP (ref 0–0)
NRBC # FLD: 0 K/UL — SIGNIFICANT CHANGE UP (ref 0–0)
PHOSPHATE SERPL-MCNC: 2.4 MG/DL — LOW (ref 2.5–4.5)
PLATELET # BLD AUTO: 200 K/UL — SIGNIFICANT CHANGE UP (ref 150–400)
POTASSIUM SERPL-MCNC: 4.1 MMOL/L — SIGNIFICANT CHANGE UP (ref 3.5–5.3)
POTASSIUM SERPL-SCNC: 4.1 MMOL/L — SIGNIFICANT CHANGE UP (ref 3.5–5.3)
RBC # BLD: 2.75 M/UL — LOW (ref 3.8–5.2)
RBC # FLD: 15.9 % — HIGH (ref 10.3–14.5)
SODIUM SERPL-SCNC: 139 MMOL/L — SIGNIFICANT CHANGE UP (ref 135–145)
WBC # BLD: 2.04 K/UL — LOW (ref 3.8–10.5)
WBC # FLD AUTO: 2.04 K/UL — LOW (ref 3.8–10.5)

## 2023-02-08 PROCEDURE — 99232 SBSQ HOSP IP/OBS MODERATE 35: CPT | Mod: GC

## 2023-02-08 RX ORDER — SODIUM,POTASSIUM PHOSPHATES 278-250MG
1 POWDER IN PACKET (EA) ORAL ONCE
Refills: 0 | Status: COMPLETED | OUTPATIENT
Start: 2023-02-08 | End: 2023-02-08

## 2023-02-08 RX ORDER — MAGNESIUM SULFATE 500 MG/ML
1 VIAL (ML) INJECTION ONCE
Refills: 0 | Status: COMPLETED | OUTPATIENT
Start: 2023-02-08 | End: 2023-02-08

## 2023-02-08 RX ORDER — OXYCODONE HYDROCHLORIDE 5 MG/1
5 TABLET ORAL EVERY 4 HOURS
Refills: 0 | Status: DISCONTINUED | OUTPATIENT
Start: 2023-02-08 | End: 2023-02-14

## 2023-02-08 RX ORDER — OXYCODONE HYDROCHLORIDE 5 MG/1
2.5 TABLET ORAL EVERY 4 HOURS
Refills: 0 | Status: DISCONTINUED | OUTPATIENT
Start: 2023-02-08 | End: 2023-02-14

## 2023-02-08 RX ADMIN — GABAPENTIN 300 MILLIGRAM(S): 400 CAPSULE ORAL at 13:26

## 2023-02-08 RX ADMIN — APIXABAN 2.5 MILLIGRAM(S): 2.5 TABLET, FILM COATED ORAL at 17:53

## 2023-02-08 RX ADMIN — Medication 1 TABLET(S): at 05:12

## 2023-02-08 RX ADMIN — Medication 975 MILLIGRAM(S): at 05:08

## 2023-02-08 RX ADMIN — SODIUM CHLORIDE 100 MILLILITER(S): 9 INJECTION, SOLUTION INTRAVENOUS at 02:11

## 2023-02-08 RX ADMIN — Medication 4 MILLIGRAM(S): at 17:54

## 2023-02-08 RX ADMIN — OXYCODONE HYDROCHLORIDE 5 MILLIGRAM(S): 5 TABLET ORAL at 18:25

## 2023-02-08 RX ADMIN — Medication 975 MILLIGRAM(S): at 18:25

## 2023-02-08 RX ADMIN — APIXABAN 2.5 MILLIGRAM(S): 2.5 TABLET, FILM COATED ORAL at 05:13

## 2023-02-08 RX ADMIN — GABAPENTIN 300 MILLIGRAM(S): 400 CAPSULE ORAL at 21:24

## 2023-02-08 RX ADMIN — GABAPENTIN 300 MILLIGRAM(S): 400 CAPSULE ORAL at 05:13

## 2023-02-08 RX ADMIN — OXYCODONE HYDROCHLORIDE 5 MILLIGRAM(S): 5 TABLET ORAL at 11:30

## 2023-02-08 RX ADMIN — ESCITALOPRAM OXALATE 20 MILLIGRAM(S): 10 TABLET, FILM COATED ORAL at 11:50

## 2023-02-08 RX ADMIN — Medication 100 MILLIGRAM(S): at 23:58

## 2023-02-08 RX ADMIN — Medication 5 MILLIGRAM(S): at 10:44

## 2023-02-08 RX ADMIN — Medication 975 MILLIGRAM(S): at 11:46

## 2023-02-08 RX ADMIN — Medication 4 MILLIGRAM(S): at 06:09

## 2023-02-08 RX ADMIN — Medication 5 MILLIGRAM(S): at 20:25

## 2023-02-08 RX ADMIN — LACTULOSE 5 GRAM(S): 10 SOLUTION ORAL at 17:54

## 2023-02-08 RX ADMIN — Medication 975 MILLIGRAM(S): at 05:45

## 2023-02-08 RX ADMIN — Medication 975 MILLIGRAM(S): at 17:53

## 2023-02-08 RX ADMIN — Medication 0.5 MILLIGRAM(S): at 20:26

## 2023-02-08 RX ADMIN — Medication 975 MILLIGRAM(S): at 12:22

## 2023-02-08 RX ADMIN — OXYCODONE HYDROCHLORIDE 5 MILLIGRAM(S): 5 TABLET ORAL at 10:44

## 2023-02-08 RX ADMIN — LAMOTRIGINE 100 MILLIGRAM(S): 25 TABLET, ORALLY DISINTEGRATING ORAL at 10:53

## 2023-02-08 RX ADMIN — Medication 100 MILLIGRAM(S): at 13:27

## 2023-02-08 RX ADMIN — OXYCODONE HYDROCHLORIDE 5 MILLIGRAM(S): 5 TABLET ORAL at 17:54

## 2023-02-08 RX ADMIN — QUETIAPINE FUMARATE 50 MILLIGRAM(S): 200 TABLET, FILM COATED ORAL at 23:58

## 2023-02-08 RX ADMIN — Medication 100 GRAM(S): at 02:10

## 2023-02-08 RX ADMIN — Medication 8 MILLIGRAM(S): at 21:23

## 2023-02-08 RX ADMIN — Medication 4 MILLIGRAM(S): at 13:26

## 2023-02-08 RX ADMIN — Medication 100 MILLIGRAM(S): at 05:08

## 2023-02-08 RX ADMIN — LAMOTRIGINE 100 MILLIGRAM(S): 25 TABLET, ORALLY DISINTEGRATING ORAL at 21:24

## 2023-02-08 NOTE — CHART NOTE - NSCHARTNOTEFT_GEN_A_CORE
Called heme/onc service to discuss potential ppx for neutropenia. Heme/onc said since there are no active signs of infection and neutropenia is currently uptrending, no need for current G-CSF treatment of abx ppx. rec'd speaking with outpatient neurologist to determine if Vumerity can be held while inpatient and to call heme/onc again if neutrophils <500 or pt has signs of infection.     Outpatient neurologist recommended holding Vumerity while in the hospital and trending neutrophils. Called heme/onc service to discuss potential ppx for neutropenia. Heme/onc said since there are no active signs of infection and neutropenia is currently uptrending, no need for current G-CSF treatment of abx ppx. rec'd speaking with outpatient neurologist to determine if Vumerity can be held while inpatient and to call heme/onc again if neutrophils <500 or pt has signs of infection.     Outpatient neurologist recommended holding Vumerity while in the hospital and trending neutrophils.    Outpt neurologist: Viral Higuera  [phone]

## 2023-02-08 NOTE — PROGRESS NOTE ADULT - SUBJECTIVE AND OBJECTIVE BOX
Orthopedic Surgery Progress Note     S: Patient seen and examined today. No acute events overnight. Pain is well controlled. Denies f/c, chest pain, shortness of breath, dizziness.    MEDICATIONS  (STANDING):  acetaminophen     Tablet .. 975 milliGRAM(s) Oral every 6 hours  apixaban 2.5 milliGRAM(s) Oral every 12 hours  ceFAZolin   IVPB 2000 milliGRAM(s) IV Intermittent every 8 hours  escitalopram 20 milliGRAM(s) Oral daily  gabapentin 300 milliGRAM(s) Oral every 8 hours  lactated ringers. 1000 milliLiter(s) (100 mL/Hr) IV Continuous <Continuous>  lactulose Syrup 5 Gram(s) Oral every 12 hours  lamoTRIgine 100 milliGRAM(s) Oral every 12 hours  methylPREDNISolone   Oral   methylPREDNISolone 4 milliGRAM(s) Oral before breakfast  methylPREDNISolone 4 milliGRAM(s) Oral after lunch  methylPREDNISolone 4 milliGRAM(s) Oral after dinner  methylPREDNISolone 8 milliGRAM(s) Oral at bedtime  polyethylene glycol 3350 17 Gram(s) Oral daily  povidone iodine 5% Nasal Swab 1 Application(s) Both Nostrils once  QUEtiapine 50 milliGRAM(s) Oral at bedtime  senna 2 Tablet(s) Oral at bedtime  traZODone 100 milliGRAM(s) Oral at bedtime  Vumerity 231mg DR capsule 2 Capsule(s) 2 Capsule(s) Oral two times a day    MEDICATIONS  (PRN):  ALPRAZolam 0.5 milliGRAM(s) Oral every 12 hours PRN anxiety  baclofen 5 milliGRAM(s) Oral every 8 hours PRN Muscle Spasm  bisacodyl Suppository 10 milliGRAM(s) Rectal daily PRN If no bowel movement by POD#2  melatonin 3 milliGRAM(s) Oral at bedtime PRN Insomnia  ondansetron Injectable 4 milliGRAM(s) IV Push every 6 hours PRN Nausea and/or Vomiting  oxyCODONE    IR 2.5 milliGRAM(s) Oral every 4 hours PRN Moderate Pain (4 - 6)  oxyCODONE    IR 5 milliGRAM(s) Oral every 4 hours PRN Severe Pain (7 - 10)      Physical Exam:  Gen: NAD  RLE:   dressing c/d/i  Motor exam consistent with baseline foot drop  + SILT DP/SP/ISAAC/Sa/T  +DP, extremity WWP    Vital Signs Last 24 Hrs  T(C): 36.9 (08 Feb 2023 00:00), Max: 36.9 (08 Feb 2023 00:00)  T(F): 98.4 (08 Feb 2023 00:00), Max: 98.4 (08 Feb 2023 00:00)  HR: 77 (08 Feb 2023 05:00) (75 - 117)  BP: 138/74 (08 Feb 2023 05:00) (98/57 - 147/89)  BP(mean): 92 (08 Feb 2023 05:00) (70 - 114)  RR: 12 (08 Feb 2023 05:00) (11 - 34)  SpO2: 98% (08 Feb 2023 05:00) (92% - 98%)    Parameters below as of 08 Feb 2023 06:00  Patient On (Oxygen Delivery Method): room air        02-06-23 @ 07:01  -  02-07-23 @ 07:00  --------------------------------------------------------  IN: 3510 mL / OUT: 750 mL / NET: 2760 mL    02-07-23 @ 07:01  -  02-08-23 @ 06:11  --------------------------------------------------------  IN: 3050 mL / OUT: 1200 mL / NET: 1850 mL                    LABS:                        7.9    2.04  )-----------( 200      ( 08 Feb 2023 01:00 )             25.1     02-08    139  |  106  |  13  ----------------------------<  141<H>  4.1   |  27  |  0.28<L>    Ca    8.5      08 Feb 2023 01:00  Phos  2.4     02-08  Mg     1.80     02-08

## 2023-02-08 NOTE — CHART NOTE - NSCHARTNOTEFT_GEN_A_CORE
Pt received bed 9T 926A, signed out to overnight ortho resident. Pt remains hemodynamically stable, ready to transfer to floor.

## 2023-02-08 NOTE — PROGRESS NOTE ADULT - ASSESSMENT
62 y/o female with PMHx of MS (bedbound) now s/p Right Hemiarthroplasty on 2/2/23. Rapid response was called because patient became hypotensive and desaturated to 80s on surgical floor. Blood pressure slightly responsive to fluid. CTA done to r/o PE (negative). SICU consulted for hemodynamic monitoring.    Interval/Overnight Events:   - GCSF (for low WBC) (pt refusing)  - Medrol Pack started (transition IV steroids to PO)   - Ancef started for neutropenia    PLAN:   Neurologic:   - hx of MS  - q8hr neurovascular checks s/p surgery  - pain control- standing Tylenol, Oxy PRN  - continue home meds (gabapentin, lexapro, vumerity, lamotrigine, seroquel)     Respiratory:  - RA  - maintain SPO> 92%    Cardiovascular:   - MAP >65   - monitor hemodynamics    Gastrointestinal/Nutrition:   - Diet: Regular   - bowel regimen  - Zofran prn for n/v     Renal/Genitourinary:   - LR @100cc/hr  - strict I&Os  - monitor electrolytes, replete prn    Hematologic:   - DVT ppx: Eliquis 2.5 bid   - monitor H/H   - transfusion if Hb<7    Infectious Disease:   - Afebrile overnight  - BCx (2/5): NGTD  - hold off on abx for now  - monitor WBC  - Neulasta x1 for leukopenia  - If febrile will initiate Vanc    Endocrine:  - ISS  - Medrol surinder    Lines/Tubes:   -PIV     DISPO: SICU     64 y/o female with PMHx of MS (bedbound) now s/p Right Hemiarthroplasty on 2/2/23. Rapid response was called because patient became hypotensive and desaturated to 80s on surgical floor. Blood pressure slightly responsive to fluid. CTA done to r/o PE (negative). SICU consulted for hemodynamic monitoring.    Interval/Overnight Events:   - GCSF (for low WBC) (pt refusing)  - Medrol Pack started (transition IV steroids to PO)   - Ancef started for neutropenia    PLAN:   Neurologic:   - hx of MS  - q8hr neurovascular checks s/p surgery  - pain control- standing Tylenol, Oxy PRN  - continue home meds (gabapentin, lexapro, vumerity, lamotrigine, seroquel)     Respiratory:  - RA  - maintain SPO> 92%    Cardiovascular:   - MAP >65   - monitor hemodynamics    Gastrointestinal/Nutrition:   - Diet: Regular   - bowel regimen  - Zofran prn for n/v     Renal/Genitourinary:   - LR @100cc/hr  - strict I&Os  - monitor electrolytes, replete prn    Hematologic:   - DVT ppx: Eliquis 2.5 bid   - monitor H/H   - transfusion if Hb<7    Infectious Disease:   - Afebrile overnight  - BCx (2/5): NGTD  - hold off on abx for now  - monitor WBC  - Neulasta x1 for leukopenia  - If febrile will initiate Vanc    Endocrine:  - ISS  - Medrol pack    Lines/Tubes:   -PIV     DISPO: SICU     62 y/o female with PMHx of MS (bedbound) now s/p Right Hemiarthroplasty on 2/2/23. Rapid response was called because patient became hypotensive and desaturated to 80s on surgical floor. Blood pressure slightly responsive to fluid. CTA done to r/o PE (negative). SICU consulted for hemodynamic monitoring.    Interval/Overnight Events:   - GCSF (for low WBC) (pt refusing)  - Medrol Pack started (transition IV steroids to PO)   - Ancef started for neutropenia    PLAN:   Neurologic:   - hx of MS  - q8hr neurovascular checks s/p surgery  - pain control- standing Tylenol, Oxy PRN  - continue home meds (gabapentin, lexapro, vumerity, lamotrigine, seroquel)     Respiratory:  - RA  - maintain SPO> 92%    Cardiovascular:   - MAP >65   - monitor hemodynamics    Gastrointestinal/Nutrition:   - Diet: Regular   - bowel regimen  - Zofran prn for n/v     Renal/Genitourinary:   - IVL  - strict I&Os  - monitor electrolytes, replete prn    Hematologic:   - DVT ppx: Eliquis 2.5 bid   - monitor H/H   - transfusion if Hb<7  - declined GCSF  - Hematology consult for chronic neutropenia prophylaxis recs    Infectious Disease:   - Afebrile overnight  - BCx (2/5): NGTD  - monitor WBC  - GCSF for leukopenia-> patient declined   - Ancef    Endocrine:  - ISS  - Medrol pack    Lines/Tubes:   -PIV     DISPO: Listed     62 y/o female with PMHx of MS (bedbound) now s/p Right Hemiarthroplasty on 2/2/23. Rapid response was called because patient became hypotensive and desaturated to 80s on surgical floor. Blood pressure slightly responsive to fluid. CTA done to r/o PE (negative). SICU consulted for hemodynamic monitoring.    Interval/Overnight Events:   - GCSF (for low WBC) (pt refusing)  - Medrol Pack started (transition IV steroids to PO)   - Ancef started for neutropenia    PLAN:   Neurologic:   - hx of MS  - q8hr neurovascular checks s/p surgery  - pain control- standing Tylenol, Oxy PRN  - continue home meds (gabapentin, lexapro, vumerity, lamotrigine, seroquel)   - per heme/onc, consult neuro for MS management then call back if neuro insists on c/w MS meds    Respiratory:  - RA  - maintain SPO> 92%    Cardiovascular:   - MAP >65   - monitor hemodynamics    Gastrointestinal/Nutrition:   - Diet: Regular   - bowel regimen  - Zofran prn for n/v     Renal/Genitourinary:   - D/C IVF and monitor BP  - strict I&Os  - monitor electrolytes, replete prn    Hematologic:   - DVT ppx: Eliquis 2.5 bid   - monitor H/H   - transfusion if Hb<7  - declined GCSF  - Per heme/onc, don't treat neutropenia > 500. Consult neuro for potentially holding vumerity     Infectious Disease:   - Afebrile overnight  - BCx (2/5): NGTD  - monitor WBC  - GCSF for leukopenia-> patient declined   - Ancef    Endocrine:  - ISS  - Medrol pack    Lines/Tubes:   -PIV     DISPO: Listed

## 2023-02-08 NOTE — PROGRESS NOTE ADULT - ASSESSMENT
ALEXY STRATTON is a 63yFemale s/p R hip hemiarthroplasty, now POD5    - Pain control  - WBAT RLE  - PT/OT/OOB  - DVT ppx: Eliquis  - Orthopedics team recommending transfusion pRBC for Hgb < 8  - being treated for neutropenia with Ancef; per SICU patient refused GCSF  - Appreciate care per SICU  - Dispo: pending PT eval

## 2023-02-08 NOTE — PROGRESS NOTE ADULT - SUBJECTIVE AND OBJECTIVE BOX
SICU Daily Progress Note  =====================================================  Interval/Overnight Events: CONY  - GCSF (for low WBC) (pt refusing)  - Medrol Pack started (transition IV steroids to PO)   - Ancef started for neutropenia    ALLERGIES:  No Known Allergies      --------------------------------------------------------------------------------------    MEDICATIONS:    Neurologic Medications  acetaminophen     Tablet .. 975 milliGRAM(s) Oral every 6 hours  ALPRAZolam 0.5 milliGRAM(s) Oral every 12 hours PRN anxiety  baclofen 5 milliGRAM(s) Oral every 8 hours PRN Muscle Spasm  escitalopram 20 milliGRAM(s) Oral daily  gabapentin 300 milliGRAM(s) Oral every 8 hours  lamoTRIgine 100 milliGRAM(s) Oral every 12 hours  melatonin 3 milliGRAM(s) Oral at bedtime PRN Insomnia  ondansetron Injectable 4 milliGRAM(s) IV Push every 6 hours PRN Nausea and/or Vomiting  oxyCODONE    IR 2.5 milliGRAM(s) Oral every 4 hours PRN Moderate Pain (4 - 6)  oxyCODONE    IR 5 milliGRAM(s) Oral every 4 hours PRN Severe Pain (7 - 10)  QUEtiapine 50 milliGRAM(s) Oral at bedtime  traZODone 100 milliGRAM(s) Oral at bedtime    Respiratory Medications    Cardiovascular Medications    Gastrointestinal Medications  bisacodyl Suppository 10 milliGRAM(s) Rectal daily PRN If no bowel movement by POD#2  lactated ringers. 1000 milliLiter(s) IV Continuous <Continuous>  lactulose Syrup 5 Gram(s) Oral every 12 hours  polyethylene glycol 3350 17 Gram(s) Oral daily  senna 2 Tablet(s) Oral at bedtime    Genitourinary Medications    Hematologic/Oncologic Medications  apixaban 2.5 milliGRAM(s) Oral every 12 hours    Antimicrobial/Immunologic Medications  ceFAZolin   IVPB 2000 milliGRAM(s) IV Intermittent every 8 hours    Endocrine/Metabolic Medications  methylPREDNISolone   Oral   methylPREDNISolone 4 milliGRAM(s) Oral before breakfast  methylPREDNISolone 4 milliGRAM(s) Oral after lunch  methylPREDNISolone 4 milliGRAM(s) Oral after dinner  methylPREDNISolone 8 milliGRAM(s) Oral at bedtime    Topical/Other Medications  povidone iodine 5% Nasal Swab 1 Application(s) Both Nostrils once  Vumerity 231mg DR capsule 2 Capsule(s) 2 Capsule(s) Oral two times a day    --------------------------------------------------------------------------------------    VITAL SIGNS:  ICU Vital Signs Last 24 Hrs  T(C): 36.9 (08 Feb 2023 00:00), Max: 36.9 (08 Feb 2023 00:00)  T(F): 98.4 (08 Feb 2023 00:00), Max: 98.4 (08 Feb 2023 00:00)  HR: 83 (08 Feb 2023 00:00) (73 - 117)  BP: 101/66 (08 Feb 2023 00:00) (92/55 - 147/89)  BP(mean): 77 (08 Feb 2023 00:00) (67 - 114)  ABP: --  ABP(mean): --  RR: 14 (08 Feb 2023 00:00) (11 - 34)  SpO2: 92% (08 Feb 2023 00:00) (92% - 98%)    O2 Parameters below as of 08 Feb 2023 00:00  Patient On (Oxygen Delivery Method): room air    --------------------------------------------------------------------------------------    INS AND OUTS:  I&O's Detail    06 Feb 2023 07:01  -  07 Feb 2023 07:00  --------------------------------------------------------  IN:    IV PiggyBack: 350 mL    Lactated Ringers: 2400 mL    Oral Fluid: 760 mL  Total IN: 3510 mL    OUT:    Voided (mL): 750 mL  Total OUT: 750 mL    Total NET: 2760 mL      07 Feb 2023 07:01  -  08 Feb 2023 00:56  --------------------------------------------------------  IN:    IV PiggyBack: 50 mL    Lactated Ringers: 1600 mL    Oral Fluid: 550 mL  Total IN: 2200 mL    OUT:    Voided (mL): 350 mL  Total OUT: 350 mL    Total NET: 1850 mL    --------------------------------------------------------------------------------------    EXAM  NEUROLOGY  Exam: Normal, in no acute distress.  Alert and oriented x4.  No focal neurologic deficits.    HEENT  Exam: Normocephalic, atraumatic, EOMI.     RESPIRATORY  Exam: Lungs clear to auscultation, Normal expansion/effort.    CARDIOVASCULAR  Exam: S1, S2.  Regular rate and rhythm.      GI/NUTRITION  Exam: Abdomen soft, Non-tender, Non-distended.   Current Diet: Regular    VASCULAR  Exam: Extremities warm, pink, well-perfused    SKIN  Exam: Good skin turgor, no skin breakdown.     METABOLIC / FLUIDS / ELECTROLYTES  lactated ringers. 1000 milliLiter(s) IV Continuous <Continuous>      HEMATOLOGIC  [x] VTE Prophylaxis: apixaban 2.5 milliGRAM(s) Oral every 12 hours    Transfusions:	[] PRBC	[] Platelets		[] FFP	[] Cryoprecipitate    INFECTIOUS DISEASE  Antimicrobials/Immunologic Medications:  ceFAZolin   IVPB 2000 milliGRAM(s) IV Intermittent every 8 hours    Day #      of     ***    TUBES / LINES / DRAINS  ***  [x] Peripheral IV  [x] Necessity of urinary, arterial, and venous catheters discussed    --------------------------------------------------------------------------------------    LABS                        7.4    1.23  )-----------( 149      ( 07 Feb 2023 01:30 )             23.8   02-07    142  |  109<H>  |  11  ----------------------------<  152<H>  4.6   |  26  |  0.28<L>    Ca    8.5      07 Feb 2023 01:30  Phos  2.2     02-07  Mg     2.00     02-07    --------------------------------------------------------------------------------------    OTHER LABORATORY:    SICU Daily Progress Note  =====================================================  Interval/Overnight Events: CONY  - GCSF (for low WBC) (pt refusing)  - Medrol Pack started (transition IV steroids to PO)   - Ancef started for neutropenia    ALLERGIES:  No Known Allergies      --------------------------------------------------------------------------------------    MEDICATIONS:    Neurologic Medications  acetaminophen     Tablet .. 975 milliGRAM(s) Oral every 6 hours  ALPRAZolam 0.5 milliGRAM(s) Oral every 12 hours PRN anxiety  baclofen 5 milliGRAM(s) Oral every 8 hours PRN Muscle Spasm  escitalopram 20 milliGRAM(s) Oral daily  gabapentin 300 milliGRAM(s) Oral every 8 hours  lamoTRIgine 100 milliGRAM(s) Oral every 12 hours  melatonin 3 milliGRAM(s) Oral at bedtime PRN Insomnia  ondansetron Injectable 4 milliGRAM(s) IV Push every 6 hours PRN Nausea and/or Vomiting  oxyCODONE    IR 2.5 milliGRAM(s) Oral every 4 hours PRN Moderate Pain (4 - 6)  oxyCODONE    IR 5 milliGRAM(s) Oral every 4 hours PRN Severe Pain (7 - 10)  QUEtiapine 50 milliGRAM(s) Oral at bedtime  traZODone 100 milliGRAM(s) Oral at bedtime    Respiratory Medications    Cardiovascular Medications    Gastrointestinal Medications  bisacodyl Suppository 10 milliGRAM(s) Rectal daily PRN If no bowel movement by POD#2  lactated ringers. 1000 milliLiter(s) IV Continuous <Continuous>  lactulose Syrup 5 Gram(s) Oral every 12 hours  polyethylene glycol 3350 17 Gram(s) Oral daily  senna 2 Tablet(s) Oral at bedtime    Genitourinary Medications    Hematologic/Oncologic Medications  apixaban 2.5 milliGRAM(s) Oral every 12 hours    Antimicrobial/Immunologic Medications  ceFAZolin   IVPB 2000 milliGRAM(s) IV Intermittent every 8 hours    Endocrine/Metabolic Medications  methylPREDNISolone   Oral   methylPREDNISolone 4 milliGRAM(s) Oral before breakfast  methylPREDNISolone 4 milliGRAM(s) Oral after lunch  methylPREDNISolone 4 milliGRAM(s) Oral after dinner  methylPREDNISolone 8 milliGRAM(s) Oral at bedtime    Topical/Other Medications  povidone iodine 5% Nasal Swab 1 Application(s) Both Nostrils once  Vumerity 231mg DR capsule 2 Capsule(s) 2 Capsule(s) Oral two times a day    --------------------------------------------------------------------------------------    VITAL SIGNS:  ICU Vital Signs Last 24 Hrs  T(C): 36.9 (08 Feb 2023 00:00), Max: 36.9 (08 Feb 2023 00:00)  T(F): 98.4 (08 Feb 2023 00:00), Max: 98.4 (08 Feb 2023 00:00)  HR: 83 (08 Feb 2023 00:00) (73 - 117)  BP: 101/66 (08 Feb 2023 00:00) (92/55 - 147/89)  BP(mean): 77 (08 Feb 2023 00:00) (67 - 114)  ABP: --  ABP(mean): --  RR: 14 (08 Feb 2023 00:00) (11 - 34)  SpO2: 92% (08 Feb 2023 00:00) (92% - 98%)    O2 Parameters below as of 08 Feb 2023 00:00  Patient On (Oxygen Delivery Method): room air    --------------------------------------------------------------------------------------    I&O's Detail    07 Feb 2023 07:01  -  08 Feb 2023 07:00  --------------------------------------------------------  IN:    IV PiggyBack: 200 mL    Lactated Ringers: 2400 mL    Oral Fluid: 550 mL  Total IN: 3150 mL    OUT:    Voided (mL): 1200 mL  Total OUT: 1200 mL    Total NET: 1950 mL          --------------------------------------------------------------------------------------    EXAM  NEUROLOGY  Exam: Normal, in no acute distress.  Alert and oriented x4.  No focal neurologic deficits.    HEENT  Exam: Normocephalic, atraumatic, EOMI.     RESPIRATORY  Exam: Lungs clear to auscultation, Normal expansion/effort.    CARDIOVASCULAR  Exam: S1, S2.  Regular rate and rhythm.      GI/NUTRITION  Exam: Abdomen soft, Non-tender, Non-distended.   Current Diet: Regular    VASCULAR  Exam: Extremities warm, pink, well-perfused    SKIN  Exam: Good skin turgor, no skin breakdown.     METABOLIC / FLUIDS / ELECTROLYTES  lactated ringers. 1000 milliLiter(s) IV Continuous <Continuous>      HEMATOLOGIC  [x] VTE Prophylaxis: apixaban 2.5 milliGRAM(s) Oral every 12 hours    Transfusions:	[] PRBC	[] Platelets		[] FFP	[] Cryoprecipitate    INFECTIOUS DISEASE  Antimicrobials/Immunologic Medications:  ceFAZolin   IVPB 2000 milliGRAM(s) IV Intermittent every 8 hours    Day #      of     ***    TUBES / LINES / DRAINS  ***  [x] Peripheral IV  [x] Necessity of urinary, arterial, and venous catheters discussed    --------------------------------------------------------------------------------------    CBC (02-08 @ 01:00)                              7.9<L>                         2.04<L>  )----------------(  200        --    % Neutrophils, --    % Lymphocytes, ANC: --                                  25.1<L>              CBC (02-07 @ 01:30)                              7.4<L>                         1.23<L>  )----------------(  149<L>     52.5  % Neutrophils, 19.2  % Lymphocytes, ANC: 0.63<L>                              23.8<L>                BMP (02-08 @ 01:00)             139     |  106     |  13    		Ca++ --      Ca 8.5                ---------------------------------( 141<H>		Mg 1.80               4.1     |  27      |  0.28<L>			Ph 2.4<L>  BMP (02-07 @ 01:30)             142     |  109<H>  |  11    		Ca++ --      Ca 8.5                ---------------------------------( 152<H>		Mg 2.00               4.6     |  26      |  0.28<L>			Ph 2.2<L>            -> .Blood Blood-Peripheral Culture (02-05 @ 01:45)     NG    NG    No growth to date.          --------------------------------------------------------------------------------------    OTHER LABORATORY:

## 2023-02-08 NOTE — PROGRESS NOTE ADULT - ATTENDING COMMENTS
I agree with the history, physical, and plan, which I have reviewed and edited where appropriate.  I agree with notes/assessment of health care providers on my service.  I have personally examined the patient.  I was physically present for the key portions of the evaluation and management (E/M) service provided.  I reviewed data and laboratory tests/x-rays and all pertinent electronic images.  The patient is a critical care patient with life threatening hemodynamic and metabolic instability in SICU.  Risk benefit analyses discussed.    The patient is in SICU with diagnosis mentioned in the note.    The plan is specified below.    64 y/o female with PMHx of MS (bedbound) now s/p Right Hemiarthroplasty on 2/2/23. Rapid response was called because patient became hypotensive and desaturated to 80s on surgical floor. Blood pressure responsive to bolus, satting well on NC. CTA done to r/o PE (negative). SICU consulted for hemodynamic monitoring.  BP improved after hydration and stress dose steroids. Stable for transfer to floor.     PLAN:   Neurologic: hx of MS, postoperative pain   - q8hr neurovascular checks s/p surgery  - pain control- standing Tylenol, Oxy PRN  - continue home meds (gabapentin, lexapro, lamotrigine, seroquel, xanax)   - d/c vumerity (confirmed with outpatient neurologist)    Respiratory:  - RA    Cardiovascular: hypotension   - MAP >65     Gastrointestinal/Nutrition:   - Diet: Regular   - bowel regimen    Renal/Genitourinary:   - IV lock   - voiding    Hematologic: leukopenia likely due to vumerity   - DVT ppx: Eliquis 2.5 bid    - heme consult for neutropenia, recommending holding vumerity     Infectious Disease: neutropenia   - f/u BCx (2/5)  - d/c ancef     Endocrine: relative adrenal insufficiency   - Glucose wnl on ISS  - taper stress dose steroids

## 2023-02-09 LAB
ANION GAP SERPL CALC-SCNC: 7 MMOL/L — SIGNIFICANT CHANGE UP (ref 7–14)
BLD GP AB SCN SERPL QL: NEGATIVE — SIGNIFICANT CHANGE UP
BLD GP AB SCN SERPL QL: NEGATIVE — SIGNIFICANT CHANGE UP
BUN SERPL-MCNC: 10 MG/DL — SIGNIFICANT CHANGE UP (ref 7–23)
CALCIUM SERPL-MCNC: 8.6 MG/DL — SIGNIFICANT CHANGE UP (ref 8.4–10.5)
CHLORIDE SERPL-SCNC: 106 MMOL/L — SIGNIFICANT CHANGE UP (ref 98–107)
CO2 SERPL-SCNC: 29 MMOL/L — SIGNIFICANT CHANGE UP (ref 22–31)
CREAT SERPL-MCNC: 0.27 MG/DL — LOW (ref 0.5–1.3)
EGFR: 123 ML/MIN/1.73M2 — SIGNIFICANT CHANGE UP
GLUCOSE SERPL-MCNC: 125 MG/DL — HIGH (ref 70–99)
HCT VFR BLD CALC: 32.8 % — LOW (ref 34.5–45)
HGB BLD-MCNC: 10.2 G/DL — LOW (ref 11.5–15.5)
MCHC RBC-ENTMCNC: 27.8 PG — SIGNIFICANT CHANGE UP (ref 27–34)
MCHC RBC-ENTMCNC: 31.1 GM/DL — LOW (ref 32–36)
MCV RBC AUTO: 89.4 FL — SIGNIFICANT CHANGE UP (ref 80–100)
NRBC # BLD: 0 /100 WBCS — SIGNIFICANT CHANGE UP (ref 0–0)
NRBC # FLD: 0 K/UL — SIGNIFICANT CHANGE UP (ref 0–0)
PLATELET # BLD AUTO: 248 K/UL — SIGNIFICANT CHANGE UP (ref 150–400)
POTASSIUM SERPL-MCNC: 4.2 MMOL/L — SIGNIFICANT CHANGE UP (ref 3.5–5.3)
POTASSIUM SERPL-SCNC: 4.2 MMOL/L — SIGNIFICANT CHANGE UP (ref 3.5–5.3)
RBC # BLD: 3.67 M/UL — LOW (ref 3.8–5.2)
RBC # FLD: 16.3 % — HIGH (ref 10.3–14.5)
RH IG SCN BLD-IMP: POSITIVE — SIGNIFICANT CHANGE UP
RH IG SCN BLD-IMP: POSITIVE — SIGNIFICANT CHANGE UP
SODIUM SERPL-SCNC: 142 MMOL/L — SIGNIFICANT CHANGE UP (ref 135–145)
WBC # BLD: 1.9 K/UL — LOW (ref 3.8–10.5)
WBC # FLD AUTO: 1.9 K/UL — LOW (ref 3.8–10.5)

## 2023-02-09 RX ADMIN — Medication 100 MILLIGRAM(S): at 22:37

## 2023-02-09 RX ADMIN — Medication 5 MILLIGRAM(S): at 14:51

## 2023-02-09 RX ADMIN — APIXABAN 2.5 MILLIGRAM(S): 2.5 TABLET, FILM COATED ORAL at 18:15

## 2023-02-09 RX ADMIN — OXYCODONE HYDROCHLORIDE 5 MILLIGRAM(S): 5 TABLET ORAL at 14:30

## 2023-02-09 RX ADMIN — QUETIAPINE FUMARATE 50 MILLIGRAM(S): 200 TABLET, FILM COATED ORAL at 22:37

## 2023-02-09 RX ADMIN — GABAPENTIN 300 MILLIGRAM(S): 400 CAPSULE ORAL at 21:05

## 2023-02-09 RX ADMIN — LACTULOSE 5 GRAM(S): 10 SOLUTION ORAL at 06:19

## 2023-02-09 RX ADMIN — Medication 975 MILLIGRAM(S): at 13:00

## 2023-02-09 RX ADMIN — Medication 975 MILLIGRAM(S): at 12:12

## 2023-02-09 RX ADMIN — Medication 4 MILLIGRAM(S): at 06:08

## 2023-02-09 RX ADMIN — Medication 0.5 MILLIGRAM(S): at 08:04

## 2023-02-09 RX ADMIN — Medication 975 MILLIGRAM(S): at 19:46

## 2023-02-09 RX ADMIN — LAMOTRIGINE 100 MILLIGRAM(S): 25 TABLET, ORALLY DISINTEGRATING ORAL at 11:56

## 2023-02-09 RX ADMIN — GABAPENTIN 300 MILLIGRAM(S): 400 CAPSULE ORAL at 14:52

## 2023-02-09 RX ADMIN — Medication 4 MILLIGRAM(S): at 22:40

## 2023-02-09 RX ADMIN — LAMOTRIGINE 100 MILLIGRAM(S): 25 TABLET, ORALLY DISINTEGRATING ORAL at 22:37

## 2023-02-09 RX ADMIN — Medication 975 MILLIGRAM(S): at 00:00

## 2023-02-09 RX ADMIN — Medication 4 MILLIGRAM(S): at 18:14

## 2023-02-09 RX ADMIN — GABAPENTIN 300 MILLIGRAM(S): 400 CAPSULE ORAL at 06:06

## 2023-02-09 RX ADMIN — Medication 4 MILLIGRAM(S): at 14:51

## 2023-02-09 RX ADMIN — Medication 975 MILLIGRAM(S): at 18:15

## 2023-02-09 RX ADMIN — Medication 975 MILLIGRAM(S): at 01:00

## 2023-02-09 RX ADMIN — Medication 975 MILLIGRAM(S): at 06:12

## 2023-02-09 RX ADMIN — Medication 975 MILLIGRAM(S): at 07:09

## 2023-02-09 RX ADMIN — OXYCODONE HYDROCHLORIDE 5 MILLIGRAM(S): 5 TABLET ORAL at 13:34

## 2023-02-09 RX ADMIN — ESCITALOPRAM OXALATE 20 MILLIGRAM(S): 10 TABLET, FILM COATED ORAL at 12:12

## 2023-02-09 RX ADMIN — APIXABAN 2.5 MILLIGRAM(S): 2.5 TABLET, FILM COATED ORAL at 06:07

## 2023-02-09 RX ADMIN — LACTULOSE 5 GRAM(S): 10 SOLUTION ORAL at 18:15

## 2023-02-09 NOTE — PROGRESS NOTE ADULT - SUBJECTIVE AND OBJECTIVE BOX
ORTHO PROGRESS NOTE     Pt seen and examined at bedside, denies SOB, CP, Dizziness. N/V/D /HA.  No significant overnight events. Pain well controlled. Rec'd 1U prbc overnight for acute blood loss anemia postop    Vital Signs Last 24 Hrs  T(C): 36.7 (09 Feb 2023 06:00), Max: 37.5 (08 Feb 2023 20:00)  T(F): 98 (09 Feb 2023 06:00), Max: 99.5 (08 Feb 2023 20:00)  HR: 72 (09 Feb 2023 06:00) (72 - 96)  BP: 148/82 (09 Feb 2023 06:00) (114/60 - 148/82)  BP(mean): 100 (08 Feb 2023 21:00) (88 - 112)  RR: 18 (09 Feb 2023 06:00) (12 - 23)  SpO2: 96% (09 Feb 2023 06:00) (95% - 100%)    Parameters below as of 09 Feb 2023 06:00  Patient On (Oxygen Delivery Method): room air        Gen: No apparent distress    right LE:  Dressing C/D I    BLE: motor intact EHL/FHL/TA/GS .  Sensation is grossly intact to light touch in the distal extremities.  Compartments are soft bilaterally.  Extremities are warm .  DP 2+ BLE     Labs:  CBC Full  -  ( 08 Feb 2023 01:00 )  WBC Count : 2.04 K/uL  RBC Count : 2.75 M/uL  Hemoglobin : 7.9 g/dL  Hematocrit : 25.1 %  Platelet Count - Automated : 200 K/uL  Mean Cell Volume : 91.3 fL  Mean Cell Hemoglobin : 28.7 pg  Mean Cell Hemoglobin Concentration : 31.5 gm/dL  Auto Neutrophil # : 1.00 K/uL  Auto Lymphocyte # : 0.59 K/uL  Auto Monocyte # : 0.41 K/uL  Auto Eosinophil # : 0.01 K/uL  Auto Basophil # : 0.00 K/uL  Auto Neutrophil % : 48.7 %  Auto Lymphocyte % : 28.8 %  Auto Monocyte % : 20.0 %  Auto Eosinophil % : 0.5 %  Auto Basophil % : 0.0 %        02-08    139  |  106  |  13  ----------------------------<  141<H>  4.1   |  27  |  0.28<L>    Ca    8.5      08 Feb 2023 01:00  Phos  2.4     02-08  Mg     1.80     02-08           A/P  s/p right hip hemiarthroplasty , POD #6    - Pain control/ Analgesia  - DVT ppx Eliquis, foot pumps  - PT/OT - wbat/oob    - Incentive Spirometer  - notify Ortho for questions

## 2023-02-10 DIAGNOSIS — I95.9 HYPOTENSION, UNSPECIFIED: ICD-10-CM

## 2023-02-10 LAB
CULTURE RESULTS: SIGNIFICANT CHANGE UP
HCT VFR BLD CALC: 32.8 % — LOW (ref 34.5–45)
HGB BLD-MCNC: 10.4 G/DL — LOW (ref 11.5–15.5)
MCHC RBC-ENTMCNC: 28.6 PG — SIGNIFICANT CHANGE UP (ref 27–34)
MCHC RBC-ENTMCNC: 31.7 GM/DL — LOW (ref 32–36)
MCV RBC AUTO: 90.1 FL — SIGNIFICANT CHANGE UP (ref 80–100)
NRBC # BLD: 0 /100 WBCS — SIGNIFICANT CHANGE UP (ref 0–0)
NRBC # FLD: 0 K/UL — SIGNIFICANT CHANGE UP (ref 0–0)
PLATELET # BLD AUTO: 310 K/UL — SIGNIFICANT CHANGE UP (ref 150–400)
RBC # BLD: 3.64 M/UL — LOW (ref 3.8–5.2)
RBC # FLD: 16.5 % — HIGH (ref 10.3–14.5)
SPECIMEN SOURCE: SIGNIFICANT CHANGE UP
WBC # BLD: 2.33 K/UL — LOW (ref 3.8–10.5)
WBC # FLD AUTO: 2.33 K/UL — LOW (ref 3.8–10.5)

## 2023-02-10 PROCEDURE — 99232 SBSQ HOSP IP/OBS MODERATE 35: CPT

## 2023-02-10 RX ORDER — DIROXIMEL FUMARATE 231 MG/1
2 CAPSULE ORAL
Qty: 0 | Refills: 0 | DISCHARGE

## 2023-02-10 RX ORDER — GABAPENTIN 400 MG/1
1 CAPSULE ORAL
Qty: 0 | Refills: 0 | DISCHARGE

## 2023-02-10 RX ORDER — ACETAMINOPHEN 500 MG
3 TABLET ORAL
Qty: 0 | Refills: 0 | DISCHARGE
Start: 2023-02-10

## 2023-02-10 RX ADMIN — SENNA PLUS 2 TABLET(S): 8.6 TABLET ORAL at 21:13

## 2023-02-10 RX ADMIN — ESCITALOPRAM OXALATE 20 MILLIGRAM(S): 10 TABLET, FILM COATED ORAL at 12:36

## 2023-02-10 RX ADMIN — OXYCODONE HYDROCHLORIDE 5 MILLIGRAM(S): 5 TABLET ORAL at 14:26

## 2023-02-10 RX ADMIN — Medication 975 MILLIGRAM(S): at 11:21

## 2023-02-10 RX ADMIN — Medication 975 MILLIGRAM(S): at 23:06

## 2023-02-10 RX ADMIN — Medication 4 MILLIGRAM(S): at 08:49

## 2023-02-10 RX ADMIN — Medication 975 MILLIGRAM(S): at 05:53

## 2023-02-10 RX ADMIN — GABAPENTIN 300 MILLIGRAM(S): 400 CAPSULE ORAL at 12:36

## 2023-02-10 RX ADMIN — LAMOTRIGINE 100 MILLIGRAM(S): 25 TABLET, ORALLY DISINTEGRATING ORAL at 21:13

## 2023-02-10 RX ADMIN — GABAPENTIN 300 MILLIGRAM(S): 400 CAPSULE ORAL at 21:14

## 2023-02-10 RX ADMIN — LAMOTRIGINE 100 MILLIGRAM(S): 25 TABLET, ORALLY DISINTEGRATING ORAL at 11:21

## 2023-02-10 RX ADMIN — QUETIAPINE FUMARATE 50 MILLIGRAM(S): 200 TABLET, FILM COATED ORAL at 21:13

## 2023-02-10 RX ADMIN — Medication 4 MILLIGRAM(S): at 21:13

## 2023-02-10 RX ADMIN — POLYETHYLENE GLYCOL 3350 17 GRAM(S): 17 POWDER, FOR SOLUTION ORAL at 11:21

## 2023-02-10 RX ADMIN — LACTULOSE 5 GRAM(S): 10 SOLUTION ORAL at 17:21

## 2023-02-10 RX ADMIN — Medication 975 MILLIGRAM(S): at 17:21

## 2023-02-10 RX ADMIN — GABAPENTIN 300 MILLIGRAM(S): 400 CAPSULE ORAL at 05:53

## 2023-02-10 RX ADMIN — Medication 4 MILLIGRAM(S): at 12:36

## 2023-02-10 RX ADMIN — Medication 100 MILLIGRAM(S): at 22:22

## 2023-02-10 RX ADMIN — Medication 5 MILLIGRAM(S): at 21:13

## 2023-02-10 RX ADMIN — Medication 975 MILLIGRAM(S): at 06:57

## 2023-02-10 RX ADMIN — APIXABAN 2.5 MILLIGRAM(S): 2.5 TABLET, FILM COATED ORAL at 17:21

## 2023-02-10 RX ADMIN — OXYCODONE HYDROCHLORIDE 5 MILLIGRAM(S): 5 TABLET ORAL at 15:20

## 2023-02-10 RX ADMIN — LACTULOSE 5 GRAM(S): 10 SOLUTION ORAL at 05:54

## 2023-02-10 RX ADMIN — APIXABAN 2.5 MILLIGRAM(S): 2.5 TABLET, FILM COATED ORAL at 05:53

## 2023-02-10 NOTE — PROGRESS NOTE ADULT - SUBJECTIVE AND OBJECTIVE BOX
Ortho Progress Note    S: Patient seen and examined. No acute events overnight. Pain well controlled with current regimen. Denies lightheadedness/dizziness, CP/SOB. Tolerating diet.       O:  Physical Exam:  Gen: Laying in bed, NAD, alert and oriented.   Resp: Unlabored breathing  Ext: RLE- dressing c/d/i          Motor exam intact at baseline          + SILT DP/SP/ISAAC/Sa/T          +DP, extremity WWP    Vital Signs Last 24 Hrs  T(C): 36.5 (10 Feb 2023 06:00), Max: 36.8 (09 Feb 2023 22:12)  T(F): 97.7 (10 Feb 2023 06:00), Max: 98.2 (09 Feb 2023 22:12)  HR: 74 (10 Feb 2023 06:00) (74 - 97)  BP: 111/69 (10 Feb 2023 06:00) (111/69 - 139/68)  BP(mean): --  RR: 18 (10 Feb 2023 06:00) (16 - 18)  SpO2: 95% (10 Feb 2023 06:00) (93% - 96%)    Parameters below as of 10 Feb 2023 06:00  Patient On (Oxygen Delivery Method): room air                              10.2   1.90  )-----------( 248      ( 09 Feb 2023 06:13 )             32.8       02-09    142  |  106  |  10  ----------------------------<  125<H>  4.2   |  29  |  0.27<L>

## 2023-02-10 NOTE — PROGRESS NOTE ADULT - SUBJECTIVE AND OBJECTIVE BOX
[facility] Division of Hospital Medicine  Isaak Juaresbrodyzeinab M.D  Pager [phone]  Available via MS Teams    SUBJECTIVE / OVERNIGHT EVENTS: No acute events overnight. Denies any Chest pain, fevers or chills.     ADDITIONAL REVIEW OF SYSTEMS:    MEDICATIONS  (STANDING):  acetaminophen     Tablet .. 975 milliGRAM(s) Oral every 6 hours  apixaban 2.5 milliGRAM(s) Oral every 12 hours  escitalopram 20 milliGRAM(s) Oral daily  gabapentin 300 milliGRAM(s) Oral every 8 hours  lactulose Syrup 5 Gram(s) Oral every 12 hours  lamoTRIgine 100 milliGRAM(s) Oral every 12 hours  methylPREDNISolone   Oral   methylPREDNISolone 4 milliGRAM(s) Oral before breakfast  methylPREDNISolone 4 milliGRAM(s) Oral at bedtime  polyethylene glycol 3350 17 Gram(s) Oral daily  povidone iodine 5% Nasal Swab 1 Application(s) Both Nostrils once  QUEtiapine 50 milliGRAM(s) Oral at bedtime  senna 2 Tablet(s) Oral at bedtime  traZODone 100 milliGRAM(s) Oral at bedtime    MEDICATIONS  (PRN):  ALPRAZolam 0.5 milliGRAM(s) Oral every 12 hours PRN anxiety  baclofen 5 milliGRAM(s) Oral every 8 hours PRN Muscle Spasm  bisacodyl Suppository 10 milliGRAM(s) Rectal daily PRN If no bowel movement by POD#2  melatonin 3 milliGRAM(s) Oral at bedtime PRN Insomnia  ondansetron Injectable 4 milliGRAM(s) IV Push every 6 hours PRN Nausea and/or Vomiting  oxyCODONE    IR 2.5 milliGRAM(s) Oral every 4 hours PRN Moderate Pain (4 - 6)  oxyCODONE    IR 5 milliGRAM(s) Oral every 4 hours PRN Severe Pain (7 - 10)      I&O's Summary    09 Feb 2023 07:01  -  10 Feb 2023 07:00  --------------------------------------------------------  IN: 0 mL / OUT: 2400 mL / NET: -2400 mL        PHYSICAL EXAM:  Vital Signs Last 24 Hrs  T(C): 37.3 (10 Feb 2023 14:00), Max: 37.3 (10 Feb 2023 14:00)  T(F): 99.2 (10 Feb 2023 14:00), Max: 99.2 (10 Feb 2023 14:00)  HR: 86 (10 Feb 2023 14:00) (74 - 86)  BP: 118/60 (10 Feb 2023 14:00) (111/69 - 139/68)  BP(mean): --  RR: 17 (10 Feb 2023 14:00) (16 - 18)  SpO2: 95% (10 Feb 2023 14:00) (93% - 96%)    Parameters below as of 10 Feb 2023 14:00  Patient On (Oxygen Delivery Method): room air      CONSTITUTIONAL: NAD, well-developed, well-groomed  EYES: PERRLA; conjunctiva and sclera clear  ENMT: Moist oral mucosa, no pharyngeal injection or exudates; normal dentition  NECK: Supple, no palpable masses; no thyromegaly  RESPIRATORY: Normal respiratory effort; lungs are clear to auscultation bilaterally  CARDIOVASCULAR: Regular rate and rhythm, normal S1 and S2, no murmur/rub/gallop; No lower extremity edema; Peripheral pulses are 2+ bilaterally  ABDOMEN: Nontender to palpation, normoactive bowel sounds, no rebound/guarding; No hepatosplenomegaly  MUSCULOSKELETAL:  no clubbing or cyanosis of digits; no joint swelling or tenderness to palpation  PSYCH: A+O to person, place, and time; affect appropriate  NEUROLOGY: sensation intact  SKIN: No rashes; no palpable lesions    LABS:                        10.4   2.33  )-----------( 310      ( 10 Feb 2023 07:37 )             32.8     02-09    142  |  106  |  10  ----------------------------<  125<H>  4.2   |  29  |  0.27<L>    Ca    8.6      09 Feb 2023 06:13                COVID-19 PCR: NotDetec (02 Feb 2023 18:55)      RADIOLOGY & ADDITIONAL TESTS:  New Results Reviewed Today: yes       COMMUNICATION:  Care Discussed with Consultants/Other Providers and Details of Discussion: orthopedics

## 2023-02-10 NOTE — PROGRESS NOTE ADULT - ASSESSMENT
63 F MS bedbound, depression who presents w/ visiting NP noticed patient was home alone w/o 24/7 aid.  Found to have s/p right hip hemiarthroplasty , POD #6

## 2023-02-10 NOTE — PROGRESS NOTE ADULT - ASSESSMENT
A/P 63F s/p right hip hemiarthroplasty , POD #6    - Pain control/ Analgesia  - DVT ppx Eliquis, foot pumps  - PT/OT - wbat/oob    - Incentive Spirometer  - Dispo: Home w/ home PT

## 2023-02-11 PROCEDURE — 99232 SBSQ HOSP IP/OBS MODERATE 35: CPT

## 2023-02-11 RX ORDER — OXYCODONE HYDROCHLORIDE 5 MG/1
1 TABLET ORAL
Qty: 42 | Refills: 0
Start: 2023-02-11 | End: 2023-02-17

## 2023-02-11 RX ORDER — TRAMADOL HYDROCHLORIDE 50 MG/1
1 TABLET ORAL
Qty: 21 | Refills: 0
Start: 2023-02-11 | End: 2023-02-17

## 2023-02-11 RX ORDER — SENNA PLUS 8.6 MG/1
2 TABLET ORAL
Qty: 14 | Refills: 0
Start: 2023-02-11 | End: 2023-02-17

## 2023-02-11 RX ORDER — APIXABAN 2.5 MG/1
1 TABLET, FILM COATED ORAL
Qty: 84 | Refills: 0
Start: 2023-02-11 | End: 2023-03-24

## 2023-02-11 RX ORDER — POLYETHYLENE GLYCOL 3350 17 G/17G
17 POWDER, FOR SOLUTION ORAL
Qty: 119 | Refills: 0
Start: 2023-02-11 | End: 2023-02-17

## 2023-02-11 RX ORDER — NALOXONE HYDROCHLORIDE 4 MG/.1ML
4 SPRAY NASAL
Qty: 1 | Refills: 0
Start: 2023-02-11 | End: 2023-02-11

## 2023-02-11 RX ADMIN — Medication 975 MILLIGRAM(S): at 06:03

## 2023-02-11 RX ADMIN — Medication 975 MILLIGRAM(S): at 12:49

## 2023-02-11 RX ADMIN — Medication 5 MILLIGRAM(S): at 11:49

## 2023-02-11 RX ADMIN — LACTULOSE 5 GRAM(S): 10 SOLUTION ORAL at 10:12

## 2023-02-11 RX ADMIN — Medication 975 MILLIGRAM(S): at 18:48

## 2023-02-11 RX ADMIN — Medication 975 MILLIGRAM(S): at 23:05

## 2023-02-11 RX ADMIN — GABAPENTIN 300 MILLIGRAM(S): 400 CAPSULE ORAL at 21:42

## 2023-02-11 RX ADMIN — Medication 975 MILLIGRAM(S): at 05:39

## 2023-02-11 RX ADMIN — LAMOTRIGINE 100 MILLIGRAM(S): 25 TABLET, ORALLY DISINTEGRATING ORAL at 21:42

## 2023-02-11 RX ADMIN — Medication 4 MILLIGRAM(S): at 21:42

## 2023-02-11 RX ADMIN — APIXABAN 2.5 MILLIGRAM(S): 2.5 TABLET, FILM COATED ORAL at 05:39

## 2023-02-11 RX ADMIN — LAMOTRIGINE 100 MILLIGRAM(S): 25 TABLET, ORALLY DISINTEGRATING ORAL at 10:12

## 2023-02-11 RX ADMIN — Medication 4 MILLIGRAM(S): at 05:39

## 2023-02-11 RX ADMIN — Medication 975 MILLIGRAM(S): at 11:49

## 2023-02-11 RX ADMIN — OXYCODONE HYDROCHLORIDE 5 MILLIGRAM(S): 5 TABLET ORAL at 11:51

## 2023-02-11 RX ADMIN — OXYCODONE HYDROCHLORIDE 5 MILLIGRAM(S): 5 TABLET ORAL at 12:49

## 2023-02-11 RX ADMIN — Medication 100 MILLIGRAM(S): at 23:05

## 2023-02-11 RX ADMIN — QUETIAPINE FUMARATE 50 MILLIGRAM(S): 200 TABLET, FILM COATED ORAL at 21:42

## 2023-02-11 RX ADMIN — ESCITALOPRAM OXALATE 20 MILLIGRAM(S): 10 TABLET, FILM COATED ORAL at 11:49

## 2023-02-11 RX ADMIN — GABAPENTIN 300 MILLIGRAM(S): 400 CAPSULE ORAL at 05:39

## 2023-02-11 RX ADMIN — Medication 5 MILLIGRAM(S): at 21:43

## 2023-02-11 RX ADMIN — Medication 975 MILLIGRAM(S): at 00:02

## 2023-02-11 RX ADMIN — APIXABAN 2.5 MILLIGRAM(S): 2.5 TABLET, FILM COATED ORAL at 18:48

## 2023-02-11 RX ADMIN — GABAPENTIN 300 MILLIGRAM(S): 400 CAPSULE ORAL at 14:02

## 2023-02-11 NOTE — PROGRESS NOTE ADULT - ASSESSMENT
63 F MS bedbound, depression who presents w/ visiting NP noticed patient was home alone w/o 24/7 aid.  Now s/p right hip hemiarthroplasty , POD #7

## 2023-02-11 NOTE — PROGRESS NOTE ADULT - SUBJECTIVE AND OBJECTIVE BOX
ORTHO PROGRESS NOTE     Pt seen and examined at bedside, denies SOB, CP, Dizziness. N/V/D /HA.  No significant overnight events. Pain well controlled.    Vital Signs Last 24 Hrs  T(C): 36.7 (11 Feb 2023 05:00), Max: 37.3 (10 Feb 2023 14:00)  T(F): 98 (11 Feb 2023 05:00), Max: 99.2 (10 Feb 2023 14:00)  HR: 73 (11 Feb 2023 05:00) (73 - 92)  BP: 127/66 (11 Feb 2023 05:00) (118/60 - 147/68)  BP(mean): --  RR: 18 (11 Feb 2023 05:00) (17 - 19)  SpO2: 95% (11 Feb 2023 05:00) (94% - 96%)    Parameters below as of 11 Feb 2023 05:00  Patient On (Oxygen Delivery Method): room air        Gen: NAD, alert and oriented  Resp: Unlabored breathing  RLE: Dressing c/d/i       SILT DP/SP/ Pippa/Saph/tib       5/5 EHL 5/5 FHL 5/5 TA 5/5 Gastroc 5/5 IP        DP+,        soft compartments, no calf ttp,       Labs:  CBC Full  -  ( 10 Feb 2023 07:37 )  WBC Count : 2.33 K/uL  RBC Count : 3.64 M/uL  Hemoglobin : 10.4 g/dL  Hematocrit : 32.8 %  Platelet Count - Automated : 310 K/uL  Mean Cell Volume : 90.1 fL  Mean Cell Hemoglobin : 28.6 pg  Mean Cell Hemoglobin Concentration : 31.7 gm/dL  Auto Neutrophil # : x  Auto Lymphocyte # : x  Auto Monocyte # : x  Auto Eosinophil # : x  Auto Basophil # : x  Auto Neutrophil % : x  Auto Lymphocyte % : x  Auto Monocyte % : x  Auto Eosinophil % : x  Auto Basophil % : x              A/P 63F s/p right hip hemiarthroplasty ,     - Pain control/ Analgesia  - DVT ppx Eliquis, foot pumps  - PT/OT - wbat/oob    - Incentive Spirometer  - Dispo: Home w/ home PT

## 2023-02-11 NOTE — PROGRESS NOTE ADULT - SUBJECTIVE AND OBJECTIVE BOX
[facility] Division of Hospital Medicine  Isaak Juaresevelyn AGARWAL  Pager [phone]  Available via MS Teams    SUBJECTIVE / OVERNIGHT EVENTS: No acute events overnight. Patient feels well overall. Wants to go home tomorrow.     ADDITIONAL REVIEW OF SYSTEMS:    MEDICATIONS  (STANDING):  acetaminophen     Tablet .. 975 milliGRAM(s) Oral every 6 hours  apixaban 2.5 milliGRAM(s) Oral every 12 hours  escitalopram 20 milliGRAM(s) Oral daily  gabapentin 300 milliGRAM(s) Oral every 8 hours  lactulose Syrup 5 Gram(s) Oral every 12 hours  lamoTRIgine 100 milliGRAM(s) Oral every 12 hours  methylPREDNISolone   Oral   methylPREDNISolone 4 milliGRAM(s) Oral before breakfast  methylPREDNISolone 4 milliGRAM(s) Oral at bedtime  polyethylene glycol 3350 17 Gram(s) Oral daily  povidone iodine 5% Nasal Swab 1 Application(s) Both Nostrils once  QUEtiapine 50 milliGRAM(s) Oral at bedtime  senna 2 Tablet(s) Oral at bedtime  traZODone 100 milliGRAM(s) Oral at bedtime    MEDICATIONS  (PRN):  baclofen 5 milliGRAM(s) Oral every 8 hours PRN Muscle Spasm  bisacodyl Suppository 10 milliGRAM(s) Rectal daily PRN If no bowel movement by POD#2  melatonin 3 milliGRAM(s) Oral at bedtime PRN Insomnia  ondansetron Injectable 4 milliGRAM(s) IV Push every 6 hours PRN Nausea and/or Vomiting  oxyCODONE    IR 2.5 milliGRAM(s) Oral every 4 hours PRN Moderate Pain (4 - 6)  oxyCODONE    IR 5 milliGRAM(s) Oral every 4 hours PRN Severe Pain (7 - 10)      I&O's Summary    10 Feb 2023 07:01  -  11 Feb 2023 07:00  --------------------------------------------------------  IN: 30 mL / OUT: 1100 mL / NET: -1070 mL        PHYSICAL EXAM:  Vital Signs Last 24 Hrs  T(C): 36.4 (11 Feb 2023 14:33), Max: 37.2 (10 Feb 2023 18:05)  T(F): 97.6 (11 Feb 2023 14:33), Max: 98.9 (10 Feb 2023 18:05)  HR: 70 (11 Feb 2023 14:33) (70 - 92)  BP: 106/58 (11 Feb 2023 14:33) (106/58 - 147/68)  BP(mean): --  RR: 18 (11 Feb 2023 14:33) (17 - 19)  SpO2: 95% (11 Feb 2023 14:33) (94% - 95%)    Parameters below as of 11 Feb 2023 14:33  Patient On (Oxygen Delivery Method): room air      CONSTITUTIONAL: NAD, well-developed, well-groomed  RESPIRATORY: Normal respiratory effort; lungs are clear to auscultation bilaterally  CARDIOVASCULAR: Regular rate and rhythm, normal S1 and S2, no murmur/rub/gallop; No lower extremity edema; Peripheral pulses are 2+ bilaterally  ABDOMEN: Nontender to palpation, normoactive bowel sounds, no rebound/guarding; No hepatosplenomegaly  MUSCULOSKELETAL:  no clubbing or cyanosis of digits; no joint swelling or tenderness to palpation  PSYCH: A+O to person, place, and time; affect appropriate  NEUROLOGY: sensation intact  SKIN: No rashes; no palpable lesions      LABS:                        10.4   2.33  )-----------( 310      ( 10 Feb 2023 07:37 )             32.8                     COVID-19 PCR: NotDetec (02 Feb 2023 18:55)      RADIOLOGY & ADDITIONAL TESTS:  New Results Reviewed Today:   New Imaging Personally Reviewed Today:  New Electrocardiogram Personally Reviewed Today:  Prior or Outpatient Records Reviewed Today:    COMMUNICATION:  Care Discussed with Consultants/Other Providers and Details of Discussion:  Discussions with Patient/Family:  PCP Communication:

## 2023-02-12 PROCEDURE — 99232 SBSQ HOSP IP/OBS MODERATE 35: CPT

## 2023-02-12 RX ADMIN — GABAPENTIN 300 MILLIGRAM(S): 400 CAPSULE ORAL at 21:00

## 2023-02-12 RX ADMIN — OXYCODONE HYDROCHLORIDE 5 MILLIGRAM(S): 5 TABLET ORAL at 01:06

## 2023-02-12 RX ADMIN — Medication 5 MILLIGRAM(S): at 22:40

## 2023-02-12 RX ADMIN — Medication 3 MILLIGRAM(S): at 22:38

## 2023-02-12 RX ADMIN — OXYCODONE HYDROCHLORIDE 5 MILLIGRAM(S): 5 TABLET ORAL at 00:00

## 2023-02-12 RX ADMIN — Medication 975 MILLIGRAM(S): at 11:34

## 2023-02-12 RX ADMIN — APIXABAN 2.5 MILLIGRAM(S): 2.5 TABLET, FILM COATED ORAL at 17:48

## 2023-02-12 RX ADMIN — GABAPENTIN 300 MILLIGRAM(S): 400 CAPSULE ORAL at 06:13

## 2023-02-12 RX ADMIN — Medication 975 MILLIGRAM(S): at 06:13

## 2023-02-12 RX ADMIN — Medication 975 MILLIGRAM(S): at 00:01

## 2023-02-12 RX ADMIN — Medication 100 MILLIGRAM(S): at 22:38

## 2023-02-12 RX ADMIN — OXYCODONE HYDROCHLORIDE 5 MILLIGRAM(S): 5 TABLET ORAL at 21:51

## 2023-02-12 RX ADMIN — Medication 975 MILLIGRAM(S): at 06:59

## 2023-02-12 RX ADMIN — Medication 975 MILLIGRAM(S): at 17:43

## 2023-02-12 RX ADMIN — Medication 4 MILLIGRAM(S): at 06:13

## 2023-02-12 RX ADMIN — LACTULOSE 5 GRAM(S): 10 SOLUTION ORAL at 10:54

## 2023-02-12 RX ADMIN — OXYCODONE HYDROCHLORIDE 5 MILLIGRAM(S): 5 TABLET ORAL at 17:47

## 2023-02-12 RX ADMIN — QUETIAPINE FUMARATE 50 MILLIGRAM(S): 200 TABLET, FILM COATED ORAL at 22:38

## 2023-02-12 RX ADMIN — GABAPENTIN 300 MILLIGRAM(S): 400 CAPSULE ORAL at 14:25

## 2023-02-12 RX ADMIN — APIXABAN 2.5 MILLIGRAM(S): 2.5 TABLET, FILM COATED ORAL at 06:13

## 2023-02-12 RX ADMIN — OXYCODONE HYDROCHLORIDE 5 MILLIGRAM(S): 5 TABLET ORAL at 18:39

## 2023-02-12 RX ADMIN — OXYCODONE HYDROCHLORIDE 5 MILLIGRAM(S): 5 TABLET ORAL at 22:51

## 2023-02-12 RX ADMIN — ESCITALOPRAM OXALATE 20 MILLIGRAM(S): 10 TABLET, FILM COATED ORAL at 11:39

## 2023-02-12 RX ADMIN — LAMOTRIGINE 100 MILLIGRAM(S): 25 TABLET, ORALLY DISINTEGRATING ORAL at 10:54

## 2023-02-12 RX ADMIN — Medication 5 MILLIGRAM(S): at 11:39

## 2023-02-12 RX ADMIN — LAMOTRIGINE 100 MILLIGRAM(S): 25 TABLET, ORALLY DISINTEGRATING ORAL at 21:01

## 2023-02-12 RX ADMIN — Medication 975 MILLIGRAM(S): at 12:34

## 2023-02-12 RX ADMIN — Medication 975 MILLIGRAM(S): at 18:43

## 2023-02-12 NOTE — PROGRESS NOTE ADULT - PROBLEM SELECTOR PLAN 4
- c/w lamictal 100 mg BID.
c/w xanax 0.5 mg BID as needed for anxiety  -c/w lexapro 20 mg  -trazodone 100 mg at bedtime and seroquel 50 mg bedtime as well w/ hold parameters for sedation while on pain medications.
- c/w lamictal 100 mg BID.
- c/w lamictal 100 mg BID.

## 2023-02-12 NOTE — PROGRESS NOTE ADULT - PROBLEM SELECTOR PLAN 6
- now improved, Hgb 10.6, received 1 units of pRBCS on 2/9 with appropriate response  - no overt signs of bleeding, patient hemodynamically stable '  - per ortho want to transfuse for Hgb <8, maintain active T&S
·  Recommendation: F-none  E-replete prn  N- on Regular diet   AC on Eliquis   sacral ulcer: skin care as per protocol, T&P q 2 hrs  plan of care d/w pt and ortho

## 2023-02-12 NOTE — PROGRESS NOTE ADULT - ASSESSMENT
63 F MS bedbound, depression who presents w/ visiting NP noticed patient was home alone w/o 24/7 aid.  Now s/p right hip hemiarthroplasty , POD #8

## 2023-02-12 NOTE — PROGRESS NOTE ADULT - PROBLEM SELECTOR PLAN 1
s/p Right Hemiarthroplasty on 2/3. tolerated procedure well , Patient is hemodynamically stable; Currently POD #8  -  c/w  Multimodal Pain control  - Weight bearing status: WBAT   - PT/OT rec home PT, pending A reinstatement, possible d/c tomorrow    - bowel regimen
s/p Right Hemiarthroplasty on 2/2 . tolerated procedure well , Patient is hemodynamically stable; recovering well   -  c/w   Multimodal Pain control  -    DVT ppx: on eliquis    -    Weight bearing status: WBAT   -    PT/OT  -bowel regimen
s/p Right Hemiarthroplasty on 2/3. tolerated procedure well , Patient is hemodynamically stable; Currently POD #7  -  c/w  Multimodal Pain control  - Weight bearing status: WBAT   - PT/OT rec home PT   - bowel regimen
s/p Right Hemiarthroplasty on 2/3. tolerated procedure well , Patient is hemodynamically stable; Currently POD #7  -  c/w  Multimodal Pain control  - Weight bearing status: WBAT   - PT/OT rec home PT, pending HHA reinstatment   - bowel regimen

## 2023-02-12 NOTE — PROGRESS NOTE ADULT - PROBLEM SELECTOR PLAN 2
- Resolved   - patient had RRT on 2/5 for hypotension and hypoxia was in SICU for approximately 3 days for hemodynamic monitoring, never placed on pressors   - CTA negative for PE, TTE with normal EF   - hypotension improved with stress dose steroids and IVF resuscitation   - BCx NGTD, no concern for underlying infection   - BP now controlled, received stress dose steroids x2 days in SICU and currently on medrol dose back steroid taper per surgery  - continue to monitor
- Resolved   - patient had RRT on 2/5 for hypotension and hypoxia was in SICU for approximately 3 days for hemodynamic monitoring, never placed on pressors   - CTA negative for PE, TTE with normal EF   - hypotension improved with stress dose steroids and IVF resuscitation   - BCx NGTD, no concern for underlying infection   - BP now controlled, received stress dose steroids x2 days in SICU and currently on medrol dose back steroid taper per surgery  - continue to monitor
c/w Vumerity   -c/w baclofen 5 mg TID prn muscle spasms.  -supportive care
- Resolved   - patient had RRT on 2/5 for hypotension and hypoxia was in SICU for approximately 3 days for hemodynamic monitoring, never placed on pressors   - CTA negative for PE, TTE with normal EF   - hypotension improved with stress dose steroids and IVF resuscitation   - BCx NGTD, no concern for underlying infection   - BP now controlled, received stress dose steroids x2 days in SICU and currently on medrol dose back steroid taper per surgery  - continue to monitor

## 2023-02-12 NOTE — PROGRESS NOTE ADULT - PROBLEM SELECTOR PLAN 7
"    Assessment & Plan     Physically well but worried  Normal well developed female without particular health issue     23 minutes spent on the date of the encounter doing chart review, history and exam, documentation and further activities per the note       FUTURE APPOINTMENTS:       - Follow-up visit in 1 year for CPE    No follow-ups on file.    Ifeanyi Gar MD  [facility] TAY Ma is a 38 year old who presents for the following health issues     HPI     New Patient/Transfer of Care        Review of Systems   Constitutional, HEENT, cardiovascular, pulmonary, gi and gu systems are negative, except as otherwise noted.      Objective    /82   Pulse 85   Temp 97.9  F (36.6  C) (Tympanic)   Resp 8   Ht 1.66 m (5' 5.35\")   Wt 50.5 kg (111 lb 6.4 oz)   SpO2 97%   BMI 18.34 kg/m    Body mass index is 18.34 kg/m .  Physical Exam   GENERAL: healthy, alert and no distress  NECK: no adenopathy, no asymmetry, masses, or scars and thyroid normal to palpation  RESP: lungs clear to auscultation - no rales, rhonchi or wheezes  CV: regular rate and rhythm, normal S1 S2, no S3 or S4, no murmur, click or rub, no peripheral edema and peripheral pulses strong  ABDOMEN: soft, nontender, no hepatosplenomegaly, no masses and bowel sounds normal  MS: no gross musculoskeletal defects noted, no edema                  "
DVT ppx: eliquis

## 2023-02-12 NOTE — PROGRESS NOTE ADULT - SUBJECTIVE AND OBJECTIVE BOX
[facility] Division of [facility] Medicine  Isaak Vazquez M.D  Pager [phone]  Available via MS Teams    SUBJECTIVE / OVERNIGHT EVENTS: no acute events overnight. Patient denies any CP, SOB, fevers or chills.     ADDITIONAL REVIEW OF SYSTEMS:    MEDICATIONS  (STANDING):  acetaminophen     Tablet .. 975 milliGRAM(s) Oral every 6 hours  apixaban 2.5 milliGRAM(s) Oral every 12 hours  escitalopram 20 milliGRAM(s) Oral daily  gabapentin 300 milliGRAM(s) Oral every 8 hours  lactulose Syrup 5 Gram(s) Oral every 12 hours  lamoTRIgine 100 milliGRAM(s) Oral every 12 hours  polyethylene glycol 3350 17 Gram(s) Oral daily  povidone iodine 5% Nasal Swab 1 Application(s) Both Nostrils once  QUEtiapine 50 milliGRAM(s) Oral at bedtime  senna 2 Tablet(s) Oral at bedtime  traZODone 100 milliGRAM(s) Oral at bedtime    MEDICATIONS  (PRN):  baclofen 5 milliGRAM(s) Oral every 8 hours PRN Muscle Spasm  bisacodyl Suppository 10 milliGRAM(s) Rectal daily PRN If no bowel movement by POD#2  melatonin 3 milliGRAM(s) Oral at bedtime PRN Insomnia  ondansetron Injectable 4 milliGRAM(s) IV Push every 6 hours PRN Nausea and/or Vomiting  oxyCODONE    IR 2.5 milliGRAM(s) Oral every 4 hours PRN Moderate Pain (4 - 6)  oxyCODONE    IR 5 milliGRAM(s) Oral every 4 hours PRN Severe Pain (7 - 10)      I&O's Summary    11 Feb 2023 07:01  -  12 Feb 2023 07:00  --------------------------------------------------------  IN: 0 mL / OUT: 1600 mL / NET: -1600 mL    12 Feb 2023 07:01  -  12 Feb 2023 14:29  --------------------------------------------------------  IN: 0 mL / OUT: 500 mL / NET: -500 mL        PHYSICAL EXAM:  Vital Signs Last 24 Hrs  T(C): 36.6 (12 Feb 2023 09:53), Max: 37 (11 Feb 2023 22:00)  T(F): 97.9 (12 Feb 2023 09:53), Max: 98.6 (11 Feb 2023 22:00)  HR: 67 (12 Feb 2023 09:53) (67 - 87)  BP: 120/66 (12 Feb 2023 09:53) (106/58 - 139/70)  BP(mean): --  RR: 18 (12 Feb 2023 09:53) (16 - 18)  SpO2: 95% (12 Feb 2023 09:53) (94% - 95%)    Parameters below as of 12 Feb 2023 09:53  Patient On (Oxygen Delivery Method): room air      CONSTITUTIONAL: NAD, well-developed, well-groomed  RESPIRATORY: Normal respiratory effort; lungs are clear to auscultation bilaterally  CARDIOVASCULAR: Regular rate and rhythm, normal S1 and S2, no murmur/rub/gallop; No lower extremity edema; Peripheral pulses are 2+ bilaterally  ABDOMEN: Nontender to palpation, normoactive bowel sounds, no rebound/guarding; No hepatosplenomegaly  MUSCULOSKELETAL:  Normal gait; no clubbing or cyanosis of digits; no joint swelling or tenderness to palpation  PSYCH: A+O to person, place, and time; affect appropriate  NEUROLOGY: CN 2-12 are intact and symmetric; no gross sensory deficits   SKIN: No rashes; no palpable lesions    LABS:                    COVID-19 PCR: NotDetec (02 Feb 2023 18:55)      RADIOLOGY & ADDITIONAL TESTS:  New Results Reviewed Today:   New Imaging Personally Reviewed Today:  New Electrocardiogram Personally Reviewed Today:  Prior or Outpatient Records Reviewed Today:    COMMUNICATION:  Care Discussed with Consultants/Other Providers and Details of Discussion:  Discussions with Patient/Family:  PCP Communication:

## 2023-02-12 NOTE — PROGRESS NOTE ADULT - PROBLEM SELECTOR PROBLEM 1
Fracture of femoral neck, right

## 2023-02-12 NOTE — PROGRESS NOTE ADULT - PROBLEM SELECTOR PROBLEM 6
Nutrition, metabolism, and development symptoms
Postoperative anemia due to acute blood loss

## 2023-02-12 NOTE — PROGRESS NOTE ADULT - PROBLEM SELECTOR PLAN 5
c/w xanax 0.5 mg BID as needed for anxiety  -c/w lexapro 20 mg  - trazodone 100 mg at bedtime and seroquel 50 mg bedtime as well w/ hold parameters for sedation while on pain medications.
c/w xanax 0.5 mg BID as needed for anxiety  -c/w lexapro 20 mg  - trazodone 100 mg at bedtime and seroquel 50 mg bedtime as well w/ hold parameters for sedation while on pain medications.
Hb trended down post op, pt asymptomatic  will CTM  no indication for transfusion at this time
c/w xanax 0.5 mg BID as needed for anxiety  -c/w lexapro 20 mg  - trazodone 100 mg at bedtime and seroquel 50 mg bedtime as well w/ hold parameters for sedation while on pain medications.

## 2023-02-12 NOTE — PROGRESS NOTE ADULT - SUBJECTIVE AND OBJECTIVE BOX
Pt seen/examined. Doing well. Pain controlled. No acute overnight complaints or events.    T(C): 37 (02-11-23 @ 22:00), Max: 37 (02-11-23 @ 22:00)  HR: 72 (02-11-23 @ 22:00) (70 - 90)  BP: 139/70 (02-11-23 @ 22:00) (106/58 - 139/70)  RR: 18 (02-11-23 @ 22:00) (17 - 19)  SpO2: 95% (02-11-23 @ 22:00) (94% - 95%)  Wt(kg): --  - Gen: NAD    Gen: NAD, alert and oriented  Resp: Unlabored breathing  RLE: Dressing c/d/i       SILT DP/SP/ Pippa/Saph/tib       5/5 EHL 5/5 FHL 5/5 TA 5/5 Gastroc 5/5 IP        DP+,        soft compartments, no calf ttp,      A/P 63F s/p right hip hemiarthroplasty ,     - Pain control/ Analgesia  - methypred taper  - DVT ppx Eliquis, foot pumps  - PT/OT - wbat/oob    - Incentive Spirometer  - Dispo: Home w/ home PT

## 2023-02-12 NOTE — PROGRESS NOTE ADULT - PROBLEM SELECTOR PLAN 3
- on Vumerity at home, however patient became leukopenic   - heme/onc was consulted by primary and was told to home medication; patient aware and will need to follow up with outpatient Neurologist on discharge to discuss resuming the medication   - c/w baclofen 5 mg TID prn muscle spasms.  -supportive care
- on Vumerity at home, however patient became leukopenic   - heme/onc was consulted by primary and was told to home medication; patient aware and will need to follow up with outpatient Neurologist on discharge to discuss resuming the medication   - c/w baclofen 5 mg TID prn muscle spasms.  -supportive care
c/w lamictal 100 mg BID.
- on Vumerity at home, however patient became leukopenic   - heme/onc was consulted by primary and was told to home medication; patient aware and will need to follow up with outpatient Neurologist in discharge   - c/w baclofen 5 mg TID prn muscle spasms.  -supportive care

## 2023-02-13 ENCOUNTER — TRANSCRIPTION ENCOUNTER (OUTPATIENT)
Age: 63
End: 2023-02-13

## 2023-02-13 LAB
ANION GAP SERPL CALC-SCNC: 9 MMOL/L — SIGNIFICANT CHANGE UP (ref 7–14)
BUN SERPL-MCNC: 21 MG/DL — SIGNIFICANT CHANGE UP (ref 7–23)
CALCIUM SERPL-MCNC: 8.9 MG/DL — SIGNIFICANT CHANGE UP (ref 8.4–10.5)
CHLORIDE SERPL-SCNC: 103 MMOL/L — SIGNIFICANT CHANGE UP (ref 98–107)
CO2 SERPL-SCNC: 26 MMOL/L — SIGNIFICANT CHANGE UP (ref 22–31)
CREAT SERPL-MCNC: 0.31 MG/DL — LOW (ref 0.5–1.3)
EGFR: 119 ML/MIN/1.73M2 — SIGNIFICANT CHANGE UP
GLUCOSE SERPL-MCNC: 94 MG/DL — SIGNIFICANT CHANGE UP (ref 70–99)
HCT VFR BLD CALC: 35.5 % — SIGNIFICANT CHANGE UP (ref 34.5–45)
HGB BLD-MCNC: 11 G/DL — LOW (ref 11.5–15.5)
MCHC RBC-ENTMCNC: 29.1 PG — SIGNIFICANT CHANGE UP (ref 27–34)
MCHC RBC-ENTMCNC: 31 GM/DL — LOW (ref 32–36)
MCV RBC AUTO: 93.9 FL — SIGNIFICANT CHANGE UP (ref 80–100)
NRBC # BLD: 0 /100 WBCS — SIGNIFICANT CHANGE UP (ref 0–0)
NRBC # FLD: 0 K/UL — SIGNIFICANT CHANGE UP (ref 0–0)
PLATELET # BLD AUTO: 366 K/UL — SIGNIFICANT CHANGE UP (ref 150–400)
POTASSIUM SERPL-MCNC: 4.3 MMOL/L — SIGNIFICANT CHANGE UP (ref 3.5–5.3)
POTASSIUM SERPL-SCNC: 4.3 MMOL/L — SIGNIFICANT CHANGE UP (ref 3.5–5.3)
RBC # BLD: 3.78 M/UL — LOW (ref 3.8–5.2)
RBC # FLD: 17.3 % — HIGH (ref 10.3–14.5)
SODIUM SERPL-SCNC: 138 MMOL/L — SIGNIFICANT CHANGE UP (ref 135–145)
WBC # BLD: 4.1 K/UL — SIGNIFICANT CHANGE UP (ref 3.8–10.5)
WBC # FLD AUTO: 4.1 K/UL — SIGNIFICANT CHANGE UP (ref 3.8–10.5)

## 2023-02-13 RX ADMIN — GABAPENTIN 300 MILLIGRAM(S): 400 CAPSULE ORAL at 22:35

## 2023-02-13 RX ADMIN — OXYCODONE HYDROCHLORIDE 5 MILLIGRAM(S): 5 TABLET ORAL at 22:30

## 2023-02-13 RX ADMIN — Medication 975 MILLIGRAM(S): at 06:46

## 2023-02-13 RX ADMIN — LACTULOSE 5 GRAM(S): 10 SOLUTION ORAL at 10:17

## 2023-02-13 RX ADMIN — Medication 975 MILLIGRAM(S): at 13:00

## 2023-02-13 RX ADMIN — GABAPENTIN 300 MILLIGRAM(S): 400 CAPSULE ORAL at 06:24

## 2023-02-13 RX ADMIN — LAMOTRIGINE 100 MILLIGRAM(S): 25 TABLET, ORALLY DISINTEGRATING ORAL at 10:18

## 2023-02-13 RX ADMIN — Medication 975 MILLIGRAM(S): at 19:07

## 2023-02-13 RX ADMIN — APIXABAN 2.5 MILLIGRAM(S): 2.5 TABLET, FILM COATED ORAL at 06:25

## 2023-02-13 RX ADMIN — OXYCODONE HYDROCHLORIDE 5 MILLIGRAM(S): 5 TABLET ORAL at 12:00

## 2023-02-13 RX ADMIN — ESCITALOPRAM OXALATE 20 MILLIGRAM(S): 10 TABLET, FILM COATED ORAL at 12:00

## 2023-02-13 RX ADMIN — OXYCODONE HYDROCHLORIDE 5 MILLIGRAM(S): 5 TABLET ORAL at 21:23

## 2023-02-13 RX ADMIN — QUETIAPINE FUMARATE 50 MILLIGRAM(S): 200 TABLET, FILM COATED ORAL at 21:23

## 2023-02-13 RX ADMIN — Medication 975 MILLIGRAM(S): at 06:25

## 2023-02-13 RX ADMIN — Medication 975 MILLIGRAM(S): at 18:07

## 2023-02-13 RX ADMIN — LAMOTRIGINE 100 MILLIGRAM(S): 25 TABLET, ORALLY DISINTEGRATING ORAL at 21:23

## 2023-02-13 RX ADMIN — Medication 975 MILLIGRAM(S): at 12:00

## 2023-02-13 RX ADMIN — Medication 5 MILLIGRAM(S): at 21:23

## 2023-02-13 RX ADMIN — APIXABAN 2.5 MILLIGRAM(S): 2.5 TABLET, FILM COATED ORAL at 18:07

## 2023-02-13 RX ADMIN — Medication 5 MILLIGRAM(S): at 06:41

## 2023-02-13 RX ADMIN — OXYCODONE HYDROCHLORIDE 5 MILLIGRAM(S): 5 TABLET ORAL at 11:03

## 2023-02-13 RX ADMIN — Medication 100 MILLIGRAM(S): at 22:35

## 2023-02-13 RX ADMIN — GABAPENTIN 300 MILLIGRAM(S): 400 CAPSULE ORAL at 13:37

## 2023-02-13 NOTE — DISCHARGE NOTE NURSING/CASE MANAGEMENT/SOCIAL WORK - PATIENT PORTAL LINK FT
You can access the FollowMyHealth Patient Portal offered by [facility] by registering at the following website: [email or web address]. By joining [employer]’s FollowMyHealth portal, you will also be able to view your health information using other applications (apps) compatible with our system.

## 2023-02-13 NOTE — PROGRESS NOTE ADULT - SUBJECTIVE AND OBJECTIVE BOX
ORTHO PROGRESS NOTE     Pt seen and examined at bedside, denies SOB, CP, Dizziness. N/V/D /HA.  No significant overnight events. Pain well controlled.    Vital Signs Last 24 Hrs  T(C): 36.6 (13 Feb 2023 01:42), Max: 37.1 (12 Feb 2023 22:12)  T(F): 97.9 (13 Feb 2023 01:42), Max: 98.8 (12 Feb 2023 22:12)  HR: 81 (13 Feb 2023 01:42) (67 - 85)  BP: 123/57 (13 Feb 2023 01:42) (106/69 - 123/57)  BP(mean): --  RR: 16 (13 Feb 2023 01:42) (16 - 18)  SpO2: 98% (13 Feb 2023 01:42) (95% - 98%)    Parameters below as of 13 Feb 2023 01:42  Patient On (Oxygen Delivery Method): room air        Gen: NAD, alert and oriented  Resp: Unlabored breathing  RLE: Dressing c/d/i       SILT DP/SP/ Pippa/Saph/tib       5/5 EHL 5/5 FHL 5/5 TA 5/5 Gastroc 5/5 IP        DP+,        soft compartments, no calf ttp,       Labs:              A/P 63F s/p right hip hemiarthroplasty ,     - Pain control/ Analgesia  - methypred taper  - DVT ppx Eliquis, foot pumps  - PT/OT - wbat/oob    - Incentive Spirometer  - Dispo: Home w/ home PT

## 2023-02-13 NOTE — DISCHARGE NOTE NURSING/CASE MANAGEMENT/SOCIAL WORK - NSDPDISTO_GEN_ALL_CORE
Pt. is afebrile and offers no complaints. In no acute distress. Right hip aquacel dressing: clean, dry and intact. Pt is tolerating diet well, and voiding in adequate amounts. PT performed bedside exercises, as patient is bedbound./Home with home care

## 2023-02-13 NOTE — DISCHARGE NOTE NURSING/CASE MANAGEMENT/SOCIAL WORK - NSDCPNINST_GEN_ALL_CORE
Make a follow up appointment with DR Rader. Call MD if you develop a fever, or if there is redness, swelling, drainage or pain not relieved by pain medication. No heavy lifting, bending, or straining to move your bowels. Take over the counter stool softeners as needed to prevent constipation which may be caused by pain medication.

## 2023-02-13 NOTE — DISCHARGE NOTE NURSING/CASE MANAGEMENT/SOCIAL WORK - NSDCPEFALRISK_GEN_ALL_CORE
For information on Fall & Injury Prevention, visit: [email or web address] OR  [email or web address] OR  https://www.cdc.gov/steadi/patient.html

## 2023-02-13 NOTE — DISCHARGE NOTE NURSING/CASE MANAGEMENT/SOCIAL WORK - NSSCTYPOFSERV_GEN_ALL_CORE
RN/PT services. Nurse to visit within 24-48 hours of discharge. Nurse to call prior to visit. Physical therapy to follow.

## 2023-02-13 NOTE — CHART NOTE - NSCHARTNOTEFT_GEN_A_CORE
Reason for Follow-Up Assessment: Length of Stay     63 F MS bedbound, depression who presents w/ visiting NP noticed patient was home alone w/o 24/7 aid.  Now s/p right hip hemiarthroplasty , POD #8.    Patient has been exhibiting fair to good PO intake, endorses she has been eating more while in the hospital than she normally would at home.  No chewing or swallowing difficulties reported. No food allergies noted or reported. No GI distress reported i.e. nausea, vomiting, diarrhea.     Source: [ X ] Patient [ ] Family [ ] RN [ X ] Chart     Diet, Regular:   Supplement Feeding Modality:  Oral  Ensure Plus High Protein Cans or Servings Per Day:  1       Frequency:  Daily (02-06-23 @ 15:14)    Ensure Plus High Protein Therapeutic Shake 1x daily (provides 350 kcal, 20g protein).     PO intake:  [ ] Poor < 50%  [ X ] Fair 50-75% [ X ] Good  % [ ] Inconsistent PO intake    Enteral /Parenteral Nutrition:       [ X ] n/a    Weights: 2/12 - 56.9kg      2/3 - 57.2kg      adm/dosing 72.6kg    ? adm weight error     Edema: No edema at present, previous edema over hospital course    Pressure Injuries:  No pressure injuries noted    _______________ Pertinent Medications_______________  MEDICATIONS  (STANDING):  acetaminophen     Tablet .. 975 milliGRAM(s) Oral every 6 hours  apixaban 2.5 milliGRAM(s) Oral every 12 hours  escitalopram 20 milliGRAM(s) Oral daily  gabapentin 300 milliGRAM(s) Oral every 8 hours  lactulose Syrup 5 Gram(s) Oral every 12 hours  lamoTRIgine 100 milliGRAM(s) Oral every 12 hours  polyethylene glycol 3350 17 Gram(s) Oral daily  povidone iodine 5% Nasal Swab 1 Application(s) Both Nostrils once  QUEtiapine 50 milliGRAM(s) Oral at bedtime  senna 2 Tablet(s) Oral at bedtime  traZODone 100 milliGRAM(s) Oral at bedtime    MEDICATIONS  (PRN):  baclofen 5 milliGRAM(s) Oral every 8 hours PRN Muscle Spasm  bisacodyl Suppository 10 milliGRAM(s) Rectal daily PRN If no bowel movement by POD#2  melatonin 3 milliGRAM(s) Oral at bedtime PRN Insomnia  ondansetron Injectable 4 milliGRAM(s) IV Push every 6 hours PRN Nausea and/or Vomiting  oxyCODONE    IR 2.5 milliGRAM(s) Oral every 4 hours PRN Moderate Pain (4 - 6)  oxyCODONE    IR 5 milliGRAM(s) Oral every 4 hours PRN Severe Pain (7 - 10)    __________________ Pertinent Labs__________________   02-13 Na138 mmol/L Glu 94 mg/dL K+ 4.3 mmol/L Cr  0.31 mg/dL<L> BUN 21 mg/dL 02-08 Phos 2.4 mg/dL<L>    Estimated Needs:   [ ] no change since previous assessment  [ X ] recalculated: 56.9kg most current weight  Kcal 25-30kcal/js=[id number]  Pro 1.0-1.2gm/kg=57-68gm protein    Previous Nutrition Diagnosis: Inadequate Oral Intake     Nutrition Diagnosis is [ ] ongoing  [X] resolved [ ] not applicable     New Nutrition Diagnosis: n/a    Monitoring and Evaluation:     [ x ] Tolerance to diet prescription / adequacy of meal intake  [ x ] Weight trends   [ x ] Pertinent labs      Recommendations:  1) Diet / Supplement Changes: Continue diet with Ensure Plus High Protein Therapeutic Shake 1x daily (350 kcal, 20g protein).   2) Obtain and record current weights to best monitor for acute changes in nutritional status.  3) Please consistently document % meal intake in nursing flowsheets.    Noelle Varghese, MS, RDN, CDN  Pager [phone]  Also available on MS Teams

## 2023-02-14 VITALS
HEART RATE: 62 BPM | TEMPERATURE: 99 F | RESPIRATION RATE: 18 BRPM | OXYGEN SATURATION: 94 % | SYSTOLIC BLOOD PRESSURE: 99 MMHG | DIASTOLIC BLOOD PRESSURE: 58 MMHG

## 2023-02-14 RX ADMIN — Medication 975 MILLIGRAM(S): at 07:00

## 2023-02-14 RX ADMIN — GABAPENTIN 300 MILLIGRAM(S): 400 CAPSULE ORAL at 05:45

## 2023-02-14 RX ADMIN — APIXABAN 2.5 MILLIGRAM(S): 2.5 TABLET, FILM COATED ORAL at 05:45

## 2023-02-14 RX ADMIN — Medication 975 MILLIGRAM(S): at 05:44

## 2023-02-14 NOTE — PROGRESS NOTE ADULT - PROVIDER SPECIALTY LIST ADULT
Orthopedics
SICU
Orthopedics
SICU
SICU
Orthopedics
Hospitalist
Internal Medicine

## 2023-02-14 NOTE — PROGRESS NOTE ADULT - SUBJECTIVE AND OBJECTIVE BOX
Ortho Progress Note    S: Patient seen and examined. No acute events overnight. Pain well controlled with current regimen. Denies lightheadedness/dizziness, CP/SOB. Tolerating diet.       O:  Physical Exam:  Gen: Laying in bed, NAD, alert and oriented.   Resp: Unlabored breathing  Ext: Dressing CDI          +DP, extremity WWP    Vital Signs Last 24 Hrs  T(C): 36.7 (14 Feb 2023 05:47), Max: 37.2 (13 Feb 2023 21:35)  T(F): 98.1 (14 Feb 2023 05:47), Max: 99 (13 Feb 2023 21:35)  HR: 71 (14 Feb 2023 05:47) (69 - 85)  BP: 120/61 (14 Feb 2023 05:47) (95/60 - 120/61)  BP(mean): --  RR: 18 (14 Feb 2023 05:47) (16 - 18)  SpO2: 97% (14 Feb 2023 05:47) (94% - 100%)    Parameters below as of 14 Feb 2023 05:47  Patient On (Oxygen Delivery Method): room air                              11.0   4.10  )-----------( 366      ( 13 Feb 2023 06:35 )             35.5       02-13    138  |  103  |  21  ----------------------------<  94  4.3   |  26  |  0.31<L>              A/P 63F s/p right hip hemiarthroplasty, doing well  - Pain control  - DVT ppx Eliquis, foot pumps  - PT/OT - wbat/oob    - Incentive Spirometer  - Dispo: Home w/ home PT today

## 2023-04-05 DIAGNOSIS — S72.001A FRACTURE OF UNSPECIFIED PART OF NECK OF RIGHT FEMUR, INITIAL ENCOUNTER FOR CLOSED FRACTURE: ICD-10-CM

## 2023-04-05 DIAGNOSIS — Z96.649 PRESENCE OF UNSPECIFIED ARTIFICIAL HIP JOINT: ICD-10-CM

## 2023-09-05 NOTE — PATIENT PROFILE ADULT - PATIENT'S PREFERRED PRONOUN
Silver Nitrate Text: The wound bed was treated with silver nitrate after the biopsy was performed. Her/She

## 2024-09-12 NOTE — PATIENT PROFILE ADULT - MONEY FOR FOOD
[facility]  [address]  [address]  [place] [address]  Phone   Fax     SLEEP CLINIC FOLLOW UP PROGRESS NOTE.    Ni Rodríguez  [id number]   1978  46 y.o.  female      PCP: Salvador Engle MD      Date of visit: 9/12/2024    Chief Complaint   Patient presents with    Sleep Apnea       Medications and allergies are reviewed by me and documented in the encounter.     SOCIAL (habits pertaining to sleep medicine)  History tobacco use:No   History of alcohol use: 0 per week  Caffeine use: 1 beverages/d    HPI:  This is a 46 y.o. PMHx Generalized Anxiety Disorder.  Here for managemetn of Obstructive Sleep Apnea (SENA 7.6 on 7/27/23 HST)  . Patient is using positive airway pressure therapy and the symptoms of sleep apnea have improved significantly on the therapy. Normally patient goes to bed at 10 PM and wakes up at 8 AM .  The patient wakes up 5-8 time(s) during the night and has no problem going back to sleep.  Feels refreshed after waking up.     Overall patient's Impression of their PAP therapy is:  Not well  Since starting zepbound for  obesity she's experiencing aerophagia  No issues with FFM  No over air pressure issues save aerophagia which improved with EPR increase     Compliance data as reviewed by me with patient room today:  Date range 6/13/2024 - 9/10/2024  Overall use 84%  4-hour tl 80%  Average days used 8 hours 35 minutes  Device AirSense 11 AutoSet  Settings 8-16 cm H2O  EPR 2 ramp only  Ramp 6 cm H2O for 20 minutes  95th percentile pressure is 11.5 cm H2O   maximum pressure is 12.0 cm H2O  AHI is 2.1  Apnea index breakdown central 1.6 obstructive 0.2 unknown 0 RERA 0.1   0 minutes 0%  Mask:  FFM - rubber material  - covers bridge of nose - prefers this mask   DME: [employer]     -HERBERT  Duloxetine 60 mg qhs    States she's doing well today   Compliant with home therapies reviewed    -Obesity on zepbound doing well with weight loss but a   "lot of nausea   Planning on reducing dose soon   Wt Readings from Last 3 Encounters:   09/12/24 93.9 kg (207 lb)   07/16/24 103 kg (227 lb 12.8 oz)   06/25/24 102 kg (224 lb)         -Last seen in sleep clinic ~9 months ago on 1/18/2024 AHI 1.1, DME [employer], mask FFM  -8/9/2024 patient message regarding aerophagia.  Instructed sleep staff to increase EPR to 2.  Offer sooner follow-up in clinic.      REVIEW OF SYSTEMS:   Is negative unless otherwise noted in HPI  [place] Sleepiness Scale :Total score: 10     Disclaimer History: The above history is based on this sleep physician's in room encounter with the patient. Pre encounter self administered questionnaires are taken into consideration and discussed with patient for any discordance. The above documentation by this sleep physician is the most accurate clinical information determined by in room sleep physician encounter with patient.     PHYSICAL EXAMINATION:  Vitals:    09/12/24 1300   BP: 97/70   Pulse: 100   SpO2: 95%   Weight: 93.9 kg (207 lb)   Height: 170.2 cm (67\")    Body mass index is 32.42 kg/m².   CONSTITUTIONAL: Well appearing, in no overt pain or respiratory distress   NOSE: No septal defect  RESP SYSTEM:  No overt respiratory distress, speaks in clear sentences without dyspnea, no accessory muscle use  CARDIOVASULAR: No edema noted  NEURO: Oriented x 3, no gross focal deficits     Compliance data as reviewed by me with patient room today:  Date range 6/13/2024 - 9/10/2024  Overall use 84%  4-hour tl 80%  Average days used 8 hours 35 minutes  Device AirSense 11 AutoSet  Settings 8-16 cm H2O  EPR 2 ramp only  Ramp 6 cm H2O for 20 minutes  95th percentile pressure is 11.5 cm H2O   maximum pressure is 12.0 cm H2O  AHI is 2.1  Apnea index breakdown central 1.6 obstructive 0.2 unknown 0 RERA 0.1   0 minutes 0%  Mask:  FFM - rubber material  - covers bridge of nose - prefers this mask   DME: [employer]     ASSESSMENT AND PLAN:  Obstructive sleep apnea ( G " 47.33).    Aerophagia  -Specific Changes made by me today:  I. After review of compliance data, in visit clinical correlation, and through shared decision making:  Aerophagia is benign zepbound contributing with intended effect of this medication follow up with prescribing physician for zepbound. Setting changes to help with aerophagia:  Increased EPR to 3 full time  Narrowed air pressure to 8 - 12.4 cm H2O  Set ramp at 8 cm H2O for 20 minutes  II.  Counseled refill humidfier chambe to max line qnightly and check with overnight awakening.   III. Counseled patient to follow-up with me in 2 months for therapy review.  Counseled may request sooner follow-up to sleep clinic anytime the patient feels necessary.  The symptoms of sleep apnea have improved with the device and the treatment.  Patient's compliance with the device is excellent for treatment of sleep apnea.  I have independently reviewed the smart card down load and discussed with the patient the download data and encouarged the patient to continue to use the device.The residual AHI is acceptable. The device is benefiting the patient and the device is medically necessary.  Without proper control of sleep apnea and good compliance there is a increased risk for hypertension, diabetes mellitus and nonrestorative sleep with hypersomnia which can increase risk for motor vehicle accidents.  Untreated sleep apnea is also a risk factor for development of atrial fibrillation, pulmonary hypertension, insulin resistance and stroke. The patient is also instructed to get the supplies from the [facility] and and change them on a regular basis.  A prescription for supplies has been sent to the [facility].  I have also discussed the good sleep hygiene habits and adequate amount of sleep needed for good health.  Obesity with BMI is Body mass index is 32.42 kg/m².. Zebound effects g.I follow up with PCP to management.Counseled weight loss will be beneficial for reduction in  severity of sleep apnea, healthy diet/exercise to achieve same, follow up with primary care physician for serial monitoring and to further guide management.  HERBERT, Counseled PAP therapy compliance for treatment sleep apnea may be beneficial for comorbid mood disorder. Follow up with PCP to discuss if cymbatla qhs dosing can be changed to qam dosing to prevent sleep disturbance contribution from this medication.     Follow up in 2 months . Patient's questions were answered.        EMR Dragon/Transcription disclaimer:   Much of this encounter note is an electronic transcription/translation of spoken language to printed text. The electronic translation of spoken language may permit erroneous, or at times, nonsensical words or phrases to be inadvertently transcribed; Although I have reviewed the note for such errors, some may still exist.       NPI #: [id number]    Rosie Fajardo, DO  Sleep Medicine  [facility]  09/12/24   no

## 2024-11-28 ENCOUNTER — INPATIENT (INPATIENT)
Facility: HOSPITAL | Age: 64
LOS: 5 days | Discharge: HOME HEALTH | End: 2024-12-04
Attending: HOSPITALIST | Admitting: HOSPITALIST
Payer: MEDICARE

## 2024-11-28 VITALS — RESPIRATION RATE: 25 BRPM | OXYGEN SATURATION: 82 %

## 2024-11-28 DIAGNOSIS — I95.9 HYPOTENSION, UNSPECIFIED: ICD-10-CM

## 2024-11-28 DIAGNOSIS — G35 MULTIPLE SCLEROSIS: ICD-10-CM

## 2024-11-28 DIAGNOSIS — F32.9 MAJOR DEPRESSIVE DISORDER, SINGLE EPISODE, UNSPECIFIED: ICD-10-CM

## 2024-11-28 DIAGNOSIS — D64.9 ANEMIA, UNSPECIFIED: ICD-10-CM

## 2024-11-28 DIAGNOSIS — R57.1 HYPOVOLEMIC SHOCK: ICD-10-CM

## 2024-11-28 DIAGNOSIS — Z29.9 ENCOUNTER FOR PROPHYLACTIC MEASURES, UNSPECIFIED: ICD-10-CM

## 2024-11-28 DIAGNOSIS — K62.89 OTHER SPECIFIED DISEASES OF ANUS AND RECTUM: ICD-10-CM

## 2024-11-28 LAB
ADD ON TEST-SPECIMEN IN LAB: SIGNIFICANT CHANGE UP
ALBUMIN SERPL ELPH-MCNC: 3.4 G/DL — SIGNIFICANT CHANGE UP (ref 3.3–5)
ALP SERPL-CCNC: 66 U/L — SIGNIFICANT CHANGE UP (ref 40–120)
ALT FLD-CCNC: 21 U/L — SIGNIFICANT CHANGE UP (ref 4–33)
ANION GAP SERPL CALC-SCNC: 21 MMOL/L — HIGH (ref 7–14)
APPEARANCE UR: CLEAR — SIGNIFICANT CHANGE UP
APTT BLD: 26.8 SEC — SIGNIFICANT CHANGE UP (ref 24.5–35.6)
APTT BLD: SIGNIFICANT CHANGE UP SEC (ref 24.5–35.6)
AST SERPL-CCNC: 23 U/L — SIGNIFICANT CHANGE UP (ref 4–32)
BASOPHILS # BLD AUTO: 0 K/UL — SIGNIFICANT CHANGE UP (ref 0–0.2)
BASOPHILS # BLD AUTO: 0.01 K/UL — SIGNIFICANT CHANGE UP (ref 0–0.2)
BASOPHILS NFR BLD AUTO: 0 % — SIGNIFICANT CHANGE UP (ref 0–2)
BASOPHILS NFR BLD AUTO: 0.2 % — SIGNIFICANT CHANGE UP (ref 0–2)
BILIRUB SERPL-MCNC: 0.4 MG/DL — SIGNIFICANT CHANGE UP (ref 0.2–1.2)
BILIRUB UR-MCNC: NEGATIVE — SIGNIFICANT CHANGE UP
BLD GP AB SCN SERPL QL: NEGATIVE — SIGNIFICANT CHANGE UP
BLOOD GAS VENOUS COMPREHENSIVE RESULT: SIGNIFICANT CHANGE UP
BUN SERPL-MCNC: 95 MG/DL — HIGH (ref 7–23)
CALCIUM SERPL-MCNC: 8.6 MG/DL — SIGNIFICANT CHANGE UP (ref 8.4–10.5)
CHLORIDE SERPL-SCNC: 99 MMOL/L — SIGNIFICANT CHANGE UP (ref 98–107)
CO2 SERPL-SCNC: 17 MMOL/L — LOW (ref 22–31)
COLOR SPEC: YELLOW — SIGNIFICANT CHANGE UP
CREAT SERPL-MCNC: 0.6 MG/DL — SIGNIFICANT CHANGE UP (ref 0.5–1.3)
DIFF PNL FLD: NEGATIVE — SIGNIFICANT CHANGE UP
EGFR: 101 ML/MIN/1.73M2 — SIGNIFICANT CHANGE UP
EOSINOPHIL # BLD AUTO: 0 K/UL — SIGNIFICANT CHANGE UP (ref 0–0.5)
EOSINOPHIL # BLD AUTO: 0 K/UL — SIGNIFICANT CHANGE UP (ref 0–0.5)
EOSINOPHIL NFR BLD AUTO: 0 % — SIGNIFICANT CHANGE UP (ref 0–6)
EOSINOPHIL NFR BLD AUTO: 0 % — SIGNIFICANT CHANGE UP (ref 0–6)
FLUAV AG NPH QL: SIGNIFICANT CHANGE UP
FLUBV AG NPH QL: SIGNIFICANT CHANGE UP
GAS PNL BLDV: SIGNIFICANT CHANGE UP
GAS PNL BLDV: SIGNIFICANT CHANGE UP
GLUCOSE SERPL-MCNC: 170 MG/DL — HIGH (ref 70–99)
GLUCOSE UR QL: NEGATIVE MG/DL — SIGNIFICANT CHANGE UP
HCT VFR BLD CALC: 17.5 % — CRITICAL LOW (ref 34.5–45)
HCT VFR BLD CALC: 21.2 % — LOW (ref 34.5–45)
HGB BLD-MCNC: 5.5 G/DL — CRITICAL LOW (ref 11.5–15.5)
HGB BLD-MCNC: 7.3 G/DL — LOW (ref 11.5–15.5)
IANC: 2.34 K/UL — SIGNIFICANT CHANGE UP (ref 1.8–7.4)
IANC: 2.34 K/UL — SIGNIFICANT CHANGE UP (ref 1.8–7.4)
IMM GRANULOCYTES NFR BLD AUTO: 0.4 % — SIGNIFICANT CHANGE UP (ref 0–0.9)
IMM GRANULOCYTES NFR BLD AUTO: 0.6 % — SIGNIFICANT CHANGE UP (ref 0–0.9)
INR BLD: 1.19 RATIO — HIGH (ref 0.85–1.16)
KETONES UR-MCNC: NEGATIVE MG/DL — SIGNIFICANT CHANGE UP
LACTATE SERPL-SCNC: 7.8 MMOL/L — CRITICAL HIGH (ref 0.5–2)
LEUKOCYTE ESTERASE UR-ACNC: ABNORMAL
LYMPHOCYTES # BLD AUTO: 0.88 K/UL — LOW (ref 1–3.3)
LYMPHOCYTES # BLD AUTO: 1.89 K/UL — SIGNIFICANT CHANGE UP (ref 1–3.3)
LYMPHOCYTES # BLD AUTO: 25.2 % — SIGNIFICANT CHANGE UP (ref 13–44)
LYMPHOCYTES # BLD AUTO: 38.9 % — SIGNIFICANT CHANGE UP (ref 13–44)
MCHC RBC-ENTMCNC: 31.1 PG — SIGNIFICANT CHANGE UP (ref 27–34)
MCHC RBC-ENTMCNC: 31.4 G/DL — LOW (ref 32–36)
MCHC RBC-ENTMCNC: 32.7 PG — SIGNIFICANT CHANGE UP (ref 27–34)
MCHC RBC-ENTMCNC: 34.4 G/DL — SIGNIFICANT CHANGE UP (ref 32–36)
MCV RBC AUTO: 104.2 FL — HIGH (ref 80–100)
MCV RBC AUTO: 90.2 FL — SIGNIFICANT CHANGE UP (ref 80–100)
MONOCYTES # BLD AUTO: 0.25 K/UL — SIGNIFICANT CHANGE UP (ref 0–0.9)
MONOCYTES # BLD AUTO: 0.6 K/UL — SIGNIFICANT CHANGE UP (ref 0–0.9)
MONOCYTES NFR BLD AUTO: 12.3 % — SIGNIFICANT CHANGE UP (ref 2–14)
MONOCYTES NFR BLD AUTO: 7.2 % — SIGNIFICANT CHANGE UP (ref 2–14)
NEUTROPHILS # BLD AUTO: 2.34 K/UL — SIGNIFICANT CHANGE UP (ref 1.8–7.4)
NEUTROPHILS # BLD AUTO: 2.34 K/UL — SIGNIFICANT CHANGE UP (ref 1.8–7.4)
NEUTROPHILS NFR BLD AUTO: 48.2 % — SIGNIFICANT CHANGE UP (ref 43–77)
NEUTROPHILS NFR BLD AUTO: 67 % — SIGNIFICANT CHANGE UP (ref 43–77)
NITRITE UR-MCNC: NEGATIVE — SIGNIFICANT CHANGE UP
NRBC # BLD: 0 /100 WBCS — SIGNIFICANT CHANGE UP (ref 0–0)
NRBC # BLD: 0 /100 WBCS — SIGNIFICANT CHANGE UP (ref 0–0)
NRBC # FLD: 0 K/UL — SIGNIFICANT CHANGE UP (ref 0–0)
NRBC # FLD: 0 K/UL — SIGNIFICANT CHANGE UP (ref 0–0)
OB PNL STL: POSITIVE
PH UR: 5.5 — SIGNIFICANT CHANGE UP (ref 5–8)
PLATELET # BLD AUTO: 122 K/UL — LOW (ref 150–400)
PLATELET # BLD AUTO: 214 K/UL — SIGNIFICANT CHANGE UP (ref 150–400)
POTASSIUM SERPL-MCNC: 5.4 MMOL/L — HIGH (ref 3.5–5.3)
POTASSIUM SERPL-SCNC: 5.4 MMOL/L — HIGH (ref 3.5–5.3)
PROT SERPL-MCNC: 5.5 G/DL — LOW (ref 6–8.3)
PROT UR-MCNC: NEGATIVE MG/DL — SIGNIFICANT CHANGE UP
PROTHROM AB SERPL-ACNC: 14.1 SEC — HIGH (ref 9.9–13.4)
RBC # BLD: 1.68 M/UL — LOW (ref 3.8–5.2)
RBC # BLD: 2.35 M/UL — LOW (ref 3.8–5.2)
RBC # FLD: 14.2 % — SIGNIFICANT CHANGE UP (ref 10.3–14.5)
RBC # FLD: 18.5 % — HIGH (ref 10.3–14.5)
RH IG SCN BLD-IMP: POSITIVE — SIGNIFICANT CHANGE UP
RSV RNA NPH QL NAA+NON-PROBE: SIGNIFICANT CHANGE UP
SARS-COV-2 RNA SPEC QL NAA+PROBE: SIGNIFICANT CHANGE UP
SODIUM SERPL-SCNC: 137 MMOL/L — SIGNIFICANT CHANGE UP (ref 135–145)
SP GR SPEC: 1.05 — HIGH (ref 1–1.03)
TROPONIN T, HIGH SENSITIVITY RESULT: 39 NG/L — SIGNIFICANT CHANGE UP
UROBILINOGEN FLD QL: 0.2 MG/DL — SIGNIFICANT CHANGE UP (ref 0.2–1)
WBC # BLD: 3.49 K/UL — LOW (ref 3.8–10.5)
WBC # BLD: 4.86 K/UL — SIGNIFICANT CHANGE UP (ref 3.8–10.5)
WBC # FLD AUTO: 3.49 K/UL — LOW (ref 3.8–10.5)
WBC # FLD AUTO: 4.86 K/UL — SIGNIFICANT CHANGE UP (ref 3.8–10.5)

## 2024-11-28 PROCEDURE — 71045 X-RAY EXAM CHEST 1 VIEW: CPT | Mod: 26

## 2024-11-28 PROCEDURE — 99223 1ST HOSP IP/OBS HIGH 75: CPT

## 2024-11-28 PROCEDURE — 99291 CRITICAL CARE FIRST HOUR: CPT

## 2024-11-28 PROCEDURE — 74177 CT ABD & PELVIS W/CONTRAST: CPT | Mod: 26,MC

## 2024-11-28 PROCEDURE — 71275 CT ANGIOGRAPHY CHEST: CPT | Mod: 26,MC

## 2024-11-28 RX ORDER — ESCITALOPRAM OXALATE 10 MG/1
20 TABLET, FILM COATED ORAL DAILY
Refills: 0 | Status: DISCONTINUED | OUTPATIENT
Start: 2024-11-28 | End: 2024-12-04

## 2024-11-28 RX ORDER — DEXTROAMPHETAMINE SACCHARATE, AMPHETAMINE ASPARTATE, DEXTROAMPHETAMINE SULFATE AND AMPHETAMINE SULFATE 1.25; 1.25; 1.25; 1.25 MG/1; MG/1; MG/1; MG/1
1 TABLET ORAL
Refills: 0 | DISCHARGE

## 2024-11-28 RX ORDER — ALPRAZOLAM 0.5 MG
0.5 TABLET ORAL
Refills: 0 | Status: DISCONTINUED | OUTPATIENT
Start: 2024-11-28 | End: 2024-12-04

## 2024-11-28 RX ORDER — PIPERACILLIN SODIUM AND TAZOBACTAM SODIUM 4; .5 G/20ML; G/20ML
3.38 INJECTION, POWDER, LYOPHILIZED, FOR SOLUTION INTRAVENOUS EVERY 8 HOURS
Refills: 0 | Status: DISCONTINUED | OUTPATIENT
Start: 2024-11-28 | End: 2024-12-02

## 2024-11-28 RX ORDER — SODIUM CHLORIDE 9 MG/ML
1000 INJECTION, SOLUTION INTRAMUSCULAR; INTRAVENOUS; SUBCUTANEOUS
Refills: 0 | Status: DISCONTINUED | OUTPATIENT
Start: 2024-11-28 | End: 2024-11-29

## 2024-11-28 RX ORDER — ACETAMINOPHEN 500MG 500 MG/1
675 TABLET, COATED ORAL ONCE
Refills: 0 | Status: COMPLETED | OUTPATIENT
Start: 2024-11-28 | End: 2024-11-28

## 2024-11-28 RX ORDER — LAMOTRIGINE 50 MG/1
100 TABLET, EXTENDED RELEASE ORAL
Refills: 0 | Status: DISCONTINUED | OUTPATIENT
Start: 2024-11-28 | End: 2024-12-04

## 2024-11-28 RX ORDER — 0.9 % SODIUM CHLORIDE 0.9 %
1400 INTRAVENOUS SOLUTION INTRAVENOUS ONCE
Refills: 0 | Status: COMPLETED | OUTPATIENT
Start: 2024-11-28 | End: 2024-11-28

## 2024-11-28 RX ORDER — BACLOFEN 100 %
20 POWDER (GRAM) MISCELLANEOUS EVERY 12 HOURS
Refills: 0 | Status: DISCONTINUED | OUTPATIENT
Start: 2024-11-28 | End: 2024-12-04

## 2024-11-28 RX ORDER — DEXTROAMPHETAMINE SACCHARATE, AMPHETAMINE ASPARTATE, DEXTROAMPHETAMINE SULFATE AND AMPHETAMINE SULFATE 1.25; 1.25; 1.25; 1.25 MG/1; MG/1; MG/1; MG/1
20 TABLET ORAL
Refills: 0 | Status: DISCONTINUED | OUTPATIENT
Start: 2024-11-28 | End: 2024-12-04

## 2024-11-28 RX ORDER — GABAPENTIN 300 MG/1
1 CAPSULE ORAL
Refills: 0 | DISCHARGE

## 2024-11-28 RX ORDER — PANTOPRAZOLE SODIUM 40 MG/1
40 TABLET, DELAYED RELEASE ORAL EVERY 12 HOURS
Refills: 0 | Status: DISCONTINUED | OUTPATIENT
Start: 2024-11-28 | End: 2024-12-01

## 2024-11-28 RX ORDER — VANCOMYCIN HCL 900 MCG/MG
1000 POWDER (GRAM) MISCELLANEOUS ONCE
Refills: 0 | Status: COMPLETED | OUTPATIENT
Start: 2024-11-28 | End: 2024-11-28

## 2024-11-28 RX ORDER — HYDROCORTISONE ACETATE 25 MG/ML
100 VIAL (ML) INJECTION EVERY 8 HOURS
Refills: 0 | Status: DISCONTINUED | OUTPATIENT
Start: 2024-11-28 | End: 2024-11-29

## 2024-11-28 RX ORDER — GABAPENTIN 300 MG/1
300 CAPSULE ORAL EVERY 8 HOURS
Refills: 0 | Status: DISCONTINUED | OUTPATIENT
Start: 2024-11-28 | End: 2024-12-04

## 2024-11-28 RX ORDER — SODIUM CHLORIDE 9 MG/ML
500 INJECTION, SOLUTION INTRAMUSCULAR; INTRAVENOUS; SUBCUTANEOUS ONCE
Refills: 0 | Status: COMPLETED | OUTPATIENT
Start: 2024-11-28 | End: 2024-11-28

## 2024-11-28 RX ORDER — ACETAMINOPHEN 500MG 500 MG/1
650 TABLET, COATED ORAL EVERY 6 HOURS
Refills: 0 | Status: DISCONTINUED | OUTPATIENT
Start: 2024-11-28 | End: 2024-12-04

## 2024-11-28 RX ORDER — TRAZODONE HYDROCHLORIDE 150 MG/1
100 TABLET ORAL AT BEDTIME
Refills: 0 | Status: DISCONTINUED | OUTPATIENT
Start: 2024-11-28 | End: 2024-12-04

## 2024-11-28 RX ORDER — DEXTROAMPHETAMINE SACCHARATE, AMPHETAMINE ASPARTATE, DEXTROAMPHETAMINE SULFATE AND AMPHETAMINE SULFATE 1.25; 1.25; 1.25; 1.25 MG/1; MG/1; MG/1; MG/1
20 TABLET ORAL DAILY
Refills: 0 | Status: DISCONTINUED | OUTPATIENT
Start: 2024-11-28 | End: 2024-11-28

## 2024-11-28 RX ORDER — PIPERACILLIN SODIUM AND TAZOBACTAM SODIUM 4; .5 G/20ML; G/20ML
3.38 INJECTION, POWDER, LYOPHILIZED, FOR SOLUTION INTRAVENOUS ONCE
Refills: 0 | Status: COMPLETED | OUTPATIENT
Start: 2024-11-28 | End: 2024-11-28

## 2024-11-28 RX ORDER — NOREPINEPHRINE BITARTRATE 1 MG/ML
0.05 INJECTION, SOLUTION, CONCENTRATE INTRAVENOUS
Qty: 8 | Refills: 0 | Status: DISCONTINUED | OUTPATIENT
Start: 2024-11-28 | End: 2024-11-28

## 2024-11-28 RX ADMIN — PIPERACILLIN SODIUM AND TAZOBACTAM SODIUM 3.38 GRAM(S): 4; .5 INJECTION, POWDER, LYOPHILIZED, FOR SOLUTION INTRAVENOUS at 13:03

## 2024-11-28 RX ADMIN — PIPERACILLIN SODIUM AND TAZOBACTAM SODIUM 25 GRAM(S): 4; .5 INJECTION, POWDER, LYOPHILIZED, FOR SOLUTION INTRAVENOUS at 22:20

## 2024-11-28 RX ADMIN — Medication 1000 MILLIGRAM(S): at 14:04

## 2024-11-28 RX ADMIN — Medication 1400 MILLILITER(S): at 09:15

## 2024-11-28 RX ADMIN — SODIUM CHLORIDE 500 MILLILITER(S): 9 INJECTION, SOLUTION INTRAMUSCULAR; INTRAVENOUS; SUBCUTANEOUS at 11:11

## 2024-11-28 RX ADMIN — SODIUM CHLORIDE 500 MILLILITER(S): 9 INJECTION, SOLUTION INTRAMUSCULAR; INTRAVENOUS; SUBCUTANEOUS at 12:11

## 2024-11-28 RX ADMIN — Medication 100 MILLIGRAM(S): at 22:19

## 2024-11-28 RX ADMIN — PIPERACILLIN SODIUM AND TAZOBACTAM SODIUM 200 GRAM(S): 4; .5 INJECTION, POWDER, LYOPHILIZED, FOR SOLUTION INTRAVENOUS at 12:33

## 2024-11-28 RX ADMIN — Medication 100 MILLIGRAM(S): at 12:32

## 2024-11-28 RX ADMIN — NOREPINEPHRINE BITARTRATE 0.05 MICROGRAM(S)/KG/MIN: 1 INJECTION, SOLUTION, CONCENTRATE INTRAVENOUS at 14:50

## 2024-11-28 RX ADMIN — ACETAMINOPHEN 500MG 270 MILLIGRAM(S): 500 TABLET, COATED ORAL at 22:19

## 2024-11-28 RX ADMIN — Medication 250 MILLIGRAM(S): at 13:04

## 2024-11-28 RX ADMIN — NOREPINEPHRINE BITARTRATE 4.22 MICROGRAM(S)/KG/MIN: 1 INJECTION, SOLUTION, CONCENTRATE INTRAVENOUS at 13:35

## 2024-11-28 RX ADMIN — Medication 1400 MILLILITER(S): at 10:15

## 2024-11-28 NOTE — ED PROVIDER NOTE - PROGRESS NOTE DETAILS
Archie Allison MD, PGY3  Lab work showing hemoglobin of 5.5, hematocrit 17.5.  AUSTIN performed, brown stool noted, no acute bleed, will send fecal occult.  Patient also with acidosis to 7.2, lactate of 7.5.  Patient also persistently hypotensive, maps persistently around 50, has received 2 L IV fluid.  Hypotension more likely secondary to septic shock rather than hypovolemic shock despite low hemoglobin as patient with no obvious signs of bleeding at this time.  Suspect decrease in hemoglobin to be more chronic rather than acute drop.  Will order blood work and start norepinephrine for patient.  Will give Zosyn, vancomycin.  With history of multiple sclerosis, patient has history of prior chronic steroid use, family unaware patient is currently taking steroids right now.  Will give stress dose steroids.  Take patient to CT scanner.  Will need MICU versus SICU consultation pending results of CT imaging. Archie Allison MD, PGY3  Lab work showing hemoglobin of 5.5, hematocrit 17.5.  AUSTIN performed, brown stool noted, no acute bleed, will send fecal occult.  Patient also with acidosis to 7.2, lactate of 7.5.  Patient also persistently hypotensive, maps persistently around 50, has received 2 L IV fluid.  Hypotension more likely secondary to septic shock rather than hypovolemic shock despite low hemoglobin as patient with no obvious signs of bleeding at this time.  Suspect decrease in hemoglobin to be more chronic rather than acute drop.  Will order blood for transfusion and start norepinephrine for patient.  Will give Zosyn, vancomycin.  With history of multiple sclerosis, patient has history of prior chronic steroid use, family unaware patient is currently taking steroids right now.  Will give stress dose steroids.  Take patient to CT scanner.  Will need MICU versus SICU consultation pending results of CT imaging.  Pt consented for blood, document in chart. Archie Allison MD, PGY3  MICU at bedside, concerned patient may be bleeding in stomach, recommending placing NG tube to evaluate for residence of blood.  NG tube placed, no blood appreciated.  As per MICU they believe after blood resuscitation patient will no longer require pressors recommending attempting to wean pressors while blood transfusions ongoing.  Will continue to reassess.

## 2024-11-28 NOTE — ED ADULT TRIAGE NOTE - CHIEF COMPLAINT QUOTE
from home, c/o SOB that started this morning. per EMS, O2 77% on room air. arrives on non-rebreather mask. unable to obtain BP. pt brought directly to [facility]

## 2024-11-28 NOTE — H&P ADULT - PROBLEM SELECTOR PLAN 2
Hb 5.5 on admission s/p one unit pRBC transfusion with repeat Hb>7  may be due to GI bleed  obtain iron studies and hemolytic workup  also send B12 and folate as MCV>100 on admission  maintain 2 large bore IVs and PPI BID  maintain active T/S Hb 5.5 on admission s/p one unit pRBC transfusion with repeat Hb>7  may be due to GI bleed  obtain iron studies and hemolytic workup  also send B12 and folate as MCV>100 on admission  maintain 2 large bore IVs and PPI BID  maintain active T/S  also continue with ng tube

## 2024-11-28 NOTE — ED PROVIDER NOTE - NS ED ATTENDING STATEMENT MOD
I have personally provided the amount of critical care time documented below concurrently with the resident/fellow.  This time excludes time spent on separate procedures and time spent teaching. I have reviewed the resident’s / fellow’s documentation and I agree with the history, exam, and assessment and plan of care.
poor balance

## 2024-11-28 NOTE — ED ADULT NURSE NOTE - OBJECTIVE STATEMENT
Received patient in [facility] c/o SOB, vomiting, abdominal pain. Patient is on cardiac monitor sinus tachycardia w/O2 sat 98% on a 2L/NC. Patient denies chest pain, headache, fever. HX MS. Patient is A&OX2, airway patent, breathing unlabored and even, radial pulses palpable, right arm contracted, abdomen soft, tender. Pending lab results, IV fluid bolus infusing, awaiting CT scan. Side rails up and safety maintained. Fall precaution in place. Call bells within reach.

## 2024-11-28 NOTE — CONSULT NOTE ADULT - CRITICAL CARE ATTENDING COMMENT
63 y/o female with PMHx of MS presenting for dyspnea and vomiting. Found to have Hb 5.5 and likely hypovolemic shock requiring pressors in ED.  Pocus done at bedside with hyperdynamic LV, IVC of less than 5mm with complete inspiratory collaps, stomach is dialted with heterogenous echodense fluid.   In the setting of low HB concer initially was for hemorrhagic shock with UGIB,  NGT placed at bedside with just digested food.   So 2Uprbc were ordered and pt on levo with expectation that if she is not bleeding right now she will improve.   repeat Labs post trasfusion and would stop levo if able after the first unit.   On follow up now off pressors and resolved lactate and hyperkalemia   Was critical ill and managed well and resuscitated by ER team now likely safe to complete investigation to the cause of this on the floor.  Resolved hypovolemic shock and anemia of unclear etiology.   - abx pending cultures  - Keep h/h over 7.   - reconsult as needed. 63 y/o female with PMHx of MS presenting for dyspnea and vomiting. Found to have Hb 5.5 and likely hypovolemic shock requiring pressors in ED.  Pocus done at bedside with hyperdynamic LV, IVC of less than 5mm with complete inspiratory collaps, stomach is dialted with heterogenous echodense fluid.   In the setting of low HB concer initially was for hemorrhagic shock with UGIB,  NGT placed at bedside with just digested food.   So 2Uprbc were ordered and pt on levo with expectation that if she is not bleeding right now she will improve.   repeat Labs post trasfusion and would stop levo if able after the first unit.   On follow up now off pressors and resolved lactate and hyperkalemia   Was critical ill and managed well and resuscitated by ER team now likely safe to complete investigation to the cause of this on the floor.  Resolved hypovolemic shock and anemia of unclear etiology.   - abx pending cultures  - Keep h/h over 7.   - reconsult as needed.  - of note the patients mother Stated that she would like her DNR if it came to that as she has led a painful life. Though once her AMS resolves I would clarify with the patient herself.

## 2024-11-28 NOTE — H&P ADULT - HISTORY OF PRESENT ILLNESS
ERP at bedside.    65 yo F with pmhx of MS and R hip arthroplasty comes to the ED after experiencing R shoulder pain and palpitations. She began started experiencing palpitations a few days ago and her symptoms persisted. She notified her aide of this, which prompted her to come to the ED. She cannot identify any alleviating, aggravating, nor inciting factors for her palpitations. She also 63 yo F with pmhx of MS and R femoral fx s/p hip arthroplasty comes to the ED after experiencing palpitations. She began started experiencing palpitations a few days ago and her symptoms persisted. She notified her aide of this, which prompted her to come to the ED. She cannot identify any alleviating, aggravating, nor inciting factors for her palpitations. She also endorses unintentional weight loss, however is unable to state how much and over in what time period. She has also not experienced syncope nor near syncope, recent fevers nor night sweats. Today, the patient states her symptoms are improved when she first arrived to the hospital and has no other acute complaints. She is also bedbound at baseline. Of note, the patient was noted to be taking Vumertiy for MS, however she no longer takes this due to leukopenia she experienced.    In the ED, the patient's vitals were notable for hypotension, tachycardia and was placed on supplemental oxygen. Her labs showed anemia and an elevated lactate. A CT Ch/A/P was significant for proctitis and possible urinary bladder stones. She received 1.9L fluid resuscitation, vanc/zosyn, one unit prbc transfusion, solu-cortef and was on levophed, which is now off, and had an ng tube placed. She is admitted for further management.

## 2024-11-28 NOTE — ED ADULT NURSE REASSESSMENT NOTE - NS ED NURSE REASSESS COMMENT FT1
1st PRBC blood transfusion completed, patient denies any adverse reactions such as fever, chills, SOB, chest pain, rash. Patient is on cardiac monitor sinus tachycardia w/O2 sat 100% on a 2L/NC. V/S taken and documented. Side rails up and safety maintained. Fall precaution in place. Call bells within reach. Family at the bedside.

## 2024-11-28 NOTE — H&P ADULT - NSHPLABSRESULTS_GEN_ALL_CORE
LABS: When present labs, imaging, and ECG were personally reviewed                          7.3    3.49  )-----------( 122      ( 2024 18:23 )             21.2           137  |  99  |  95[H]  ----------------------------<  170[H]  5.4[H]   |  17[L]  |  0.60    Ca    8.6      2024 08:45    TPro  5.5[L]  /  Alb  3.4  /  TBili  0.4  /  DBili  x   /  AST  23  /  ALT  21  /  AlkPhos  66         LIVER FUNCTIONS - ( 2024 08:45 )  Alb: 3.4 g/dL / Pro: 5.5 g/dL / ALK PHOS: 66 U/L / ALT: 21 U/L / AST: 23 U/L / GGT: x                    Urinalysis Basic - ( 2024 11:06 )    Color: Yellow / Appearance: Clear / S.051 / pH: x  Gluc: x / Ketone: Negative mg/dL  / Bili: Negative / Urobili: 0.2 mg/dL   Blood: x / Protein: Negative mg/dL / Nitrite: Negative   Leuk Esterase: Small / RBC: 2 /HPF / WBC 4 /HPF   Sq Epi: x / Non Sq Epi: 1 /HPF / Bacteria: Negative /HPF        PT/INR - ( 2024 11:37 )   PT: 14.1 sec;   INR: 1.19 ratio         PTT - ( 2024 11:37 )  PTT:26.8 sec    Lactate Trend   @ 12:31 Lactate:5.0    @ 08:45 Lactate:7.8             CAPILLARY BLOOD GLUCOSE                RADIOLOGY & ADDITIONAL TESTS:   IMPRESSION:  No pulmonary embolism.    Proctitis.    Layering hyperdensity within the left posterolateral urinary bladder, may   represent urinary stones.    EKG: sinus tachycardia, QTc 465ms

## 2024-11-28 NOTE — H&P ADULT - PROBLEM SELECTOR PLAN 4
hx of MS and no longer taking Vumerity due to leukopenia   continue with gabapentin, lamictal, and baclofen

## 2024-11-28 NOTE — H&P ADULT - PROBLEM SELECTOR PLAN 1
patient hypotensive on admission  could be due to hypovolemic/hemorrhagic in setting of possible GI bleed given anemia on presentation  may also be due to septic shock, source can be proctitis   s/p fluid and pRBC resuscitation in ED    PLAN  continue with zosyn   follow up infectious workup UCx and blood cx (RVP and UA -)  appreciate GI recs for concern of GI bleed  maintain 2 large bore IVs and PPI BID patient hypotensive on admission  could be due to hypovolemic/hemorrhagic in setting of possible GI bleed given anemia on presentation  may also be due to septic shock, source can be proctitis   s/p fluid and pRBC resuscitation in ED    PLAN  continue with zosyn  will also continue with solu-cortef and IV fluids    follow up infectious workup UCx and blood cx (RVP and UA -)  appreciate GI recs for concern of GI bleed  maintain 2 large bore IVs and PPI BID

## 2024-11-28 NOTE — H&P ADULT - ASSESSMENT
63 yo F with pmhx of MS and R femoral fx s/p hip arthroplasty comes to the ED after experiencing palpitations. On admission, vitals significant for hypotension and tachycardia with labs showing hemoglobin 5.5 and a lactate>7 s/p fluid resuscitation, one unit pRBC transfusion, solu-cortef, and levophed (now off). She is admitted for further management.

## 2024-11-28 NOTE — ED ADULT NURSE REASSESSMENT NOTE - NS ED NURSE REASSESS COMMENT FT1
Patient's BP is low 76/35,  sinus tachycardia on the monitor, Dr. Allison notified immediately, IV fluid bolus infusing. Pending type and screen result, awaiting CT scan. Patient's BP is low 76/35,  sinus tachycardia on the monitor, Dr. Allison notified immediately, IV fluid bolus infusing. Patient denies any pain or discomfort at this time. Pending type and screen result for blood transfusion, awaiting CT scan.

## 2024-11-28 NOTE — ED ADULT NURSE NOTE - NSFALLHARMRISKINTERV_ED_ALL_ED

## 2024-11-28 NOTE — ED ADULT NURSE REASSESSMENT NOTE - NS ED NURSE REASSESS COMMENT FT1
Received pt in room, AAOX2, ST cardiac monitor, Pt on room air, NG tube in right nares on low suction, Pt voiding on a premafit. MD cancelled second unit of PRBC because pt hemoglobin went up, no acute distress noted, caregiver at bedside.

## 2024-11-28 NOTE — H&P ADULT - NSHPREVIEWOFSYSTEMS_GEN_ALL_CORE
Review of Systems:  Constitutional: No fever, + weight loss, good appetite/po intake  Head: No headache   Eyes: No blurry vision, No diplopia  Neuro: No tremors, No muscle weakness   Cardiovascular: No chest pain, + palpitations  Respiratory: No SOB, No cough  GI: No nausea, No vomiting, No diarrhea  : No dysuria, No hematuria  Skin: No rash  MSK: No joint pain   Psych: No depression  Heme: No abnormal bruising, no abnormal bleeding

## 2024-11-28 NOTE — H&P ADULT - ATTENDING COMMENTS
Pt is a 65 yo F with pmhx of MS, bedbound at baseline, anxiety/depression presents w/ dyspnea, episode of vomiting, and palpitations since yesterday. Hx limited 2/2 pt is poor historian however states she overall has not felt well and did have 1 episode of nbnb emesis yesterday. She denies blood in stool/black stools. Pt found to be hypotensive and tachycardiac in setting of Hb 5.5, initially requiring IVF, levophed, 1 unit of prbc, and stress dose steroids. Pt seen by MICU team and titrated off levophed after 1 unit of prbc. CTA chest negative for PE and CT a/p showing proctitis. NGT placed in ED w/ gastric output of digested food. Rectal exam performed by ED providers w/ brown stool. Pt admitted for further evaluation and management.     #Hypotension  - suspect likely in setting of hypovolemia, possible bleed although unclear source vs sepsis. Improved s/p IVF, 1 unit prbc and stress dose steroids. Off levophed. Not on steroids at home but previously treated for AI while in SICU in 2023.  - c/w steroid for now given improvement , titrate down  - c/w IV abx, f/u cultures  - monitor BPs closely     #Anemia  - Hb 5.5, macrocytic. No clear source of bleed. Hb responded to 1 units prbc, now 7.3  - f/u hemolytic w/u, b12, folate, iron studies  - active T&S. Transfuse Hb <7  - f/u GI eval    Rest as above Pt is a 63 yo F with pmhx of MS, bedbound at baseline, anxiety/depression presents w/ dyspnea, episode of vomiting, and palpitations since yesterday. Hx limited 2/2 pt is poor historian however states she overall has not felt well and did have 1 episode of nbnb emesis yesterday. She denies blood in stool/black stools. Pt found to be hypotensive and tachycardiac in setting of Hb 5.5, initially requiring IVF, levophed, 1 unit of prbc, and stress dose steroids. Pt seen by MICU team and titrated off levophed after 1 unit of prbc. CTA chest negative for PE and CT a/p showing proctitis. NGT placed in ED w/ gastric output of digested food. Rectal exam performed by ED providers w/ brown stool. Pt admitted for further evaluation and management.     #Hypotension  - suspect likely in setting of hypovolemia, possible bleed although unclear source vs sepsis. Improved s/p IVF, 1 unit prbc and stress dose steroids. Off levophed. Not on steroids at home but previously treated for AI while in SICU in 2023.  - c/w steroid for now given improvement , titrate down  - c/w IV abx, f/u cultures  - monitor BPs closely     #Anemia  - Hb 5.5, macrocytic. No clear source of bleed. Hb responded to 1 units prbc, now 7.3  - f/u hemolytic w/u, b12, folate, iron studies  - active T&S. Transfuse Hb <7  - f/u GI eval    #MICHELLE  - baseline Cr 0.2-0.3  - likely pre-renal in setting of hypovolemia vs ATN in setting of hypotension  - monitor Cr  - avoid nephrotoxic agents  - dose medications renally    Rest as above

## 2024-11-28 NOTE — ED PROVIDER NOTE - CLINICAL SUMMARY MEDICAL DECISION MAKING FREE TEXT BOX
Franci: Patient is a 64-year-old woman with history of MS who presents to the emergency department with difficulty breathing and vomiting.  Patient with labile blood pressure and low oxygen saturation that may or may not be associated with difficulty getting a waveform.  Patient found to have abdominal pain.  Patient has been tachycardic for the last 2 days.  Will start with sepsis protocol looking for any infectious causes urine or abdomen.  Will need abdominal CT because of abdominal pain found on exam.  Patient has shortness of breath and tachycardia in the setting of being mostly bedbound will look for PE.  Patient at high risk because of previous diseases and presentation.  Patient will definitely need to be admitted. Franci: Patient is a 64-year-old woman with history of MS who presents to the emergency department with difficulty breathing and vomiting.  Patient with labile blood pressure and low oxygen saturation that may or may not be associated with difficulty getting a waveform.  Patient found to have abdominal pain.  Patient has been tachycardic for the last 2 days.  Will start with sepsis protocol looking for any infectious causes urine or abdomen.  Will need abdominal CT because of abdominal pain found on exam.  Patient has shortness of breath and tachycardia in the setting of being mostly bedbound will look for PE.  Patient at high risk because of previous diseases and presentation.  Patient will definitely need to be admitted.    Archie Allison MD, PGY3  64-year-old female with past medical history of multiple sclerosis presenting to emergency department accompanied by aids brought in by EMS for heart palpitations, shortness of breath, vomiting.  As per patient's aids yesterday she began complaining that her heart was racing, having difficult time breathing but did not want to come to the emergency department.  This morning aides noticed patient to be vomiting, nonbloody nonbilious.  Still complaining of chest discomfort, heart racing prompting visit to emergency department.  As per EMS O2 sat low 80s, put on 15 L nonrebreather.  Patient not providing much additional history at this time, but does state she feels short of breath and that her heart is racing.  Denies any recent fever, headache, cough, diarrhea, dysuria, rash, extremity edema.  Not on anticoagulation, no reported recent bleeding.    Gen: Frail appearing  HEENT:  mucous membranes dry, PERRL  CV: tachycardic +S1/S2, no M/R/G, 2+ radial pulses b/l  Resp: CTAB, no W/R/R, no accessory muscle use, no increased work of breathing  GI: Abdomen with generalized ttp without rebound or guarding. Old healed surgical scars over abdomen..   MSK: no LE edema  Neuro: A&Ox2, following commands, moving all four extremities spontaneously    In the emergency department patient brought in by EMS on 15 L nonrebreather.  Patient taken off 15 L and put on 2 L nasal cannula, saturating 95 to 99% on room air with good waveform.  Had transient low blood pressure reading of 90s over 60s, possibly due to cuff positioning as repeat blood pressures obtained all with systolics above 120.  Patient mentating normally.  At baseline mental status as per aides who are accompanying her.  Alert to person place, situation.  On exam patient frail appearing, uncomfortable.  Abdomen with generalized tenderness to palpation without rebound or guarding.  Multiple surgical scars seen over abdomen.  Differential including but not limited to SBO, gastritis, pancreatitis, ACS, pulmonary embolism, pneumonia, viral illness, anemia, electrolyte abnormalities.  Will obtain rectal temperature, plan to obtain septic workup including cultures, labs, urine.  Will CTA chest abdomen pelvis.  Patient saturating well on 2 L nasal cannula at this time.  Disposition pending workup and patient response to interventions.  Will reassess.

## 2024-11-28 NOTE — CONSULT NOTE ADULT - ASSESSMENT
65 y/o female with PMHx of MS presenting for dyspnea and vomiting. Found to have Hb 5.5 and likely hypovolemic shock requiring pressors in ED.    #Hypovolemic shock  #Acute on chronic anemia  - Transfuse 2u PRBC and repeat CBC - Hb will likely drop as we are resuscitating volume  - Attempt to wean pressors after transfused  - Trend VBG + lactate until resolution of acidosis, likely induced from anemia/hypovolemia  - Send UA/infectious workup to r/o septic shock  - Consider GI eval for possible GIB      *Note not finalized until signed by attending*

## 2024-11-28 NOTE — ED ADULT NURSE REASSESSMENT NOTE - NS ED NURSE REASSESS COMMENT FT1
Patient's hemoglobin from previous VBG is 7.2, will repeat CBC first as per Dr. Prajapati's order. Will hold off 2nd PRBC for now.

## 2024-11-28 NOTE — H&P ADULT - NSHPPHYSICALEXAM_GEN_ALL_CORE
LOS:     VITALS:   T(C): 36.7 (11-28-24 @ 21:53), Max: 36.9 (11-28-24 @ 17:06)  HR: 117 (11-28-24 @ 21:53) (104 - 119)  BP: 120/77 (11-28-24 @ 21:53) (74/40 - 122/73)  RR: 19 (11-28-24 @ 21:53) (14 - 25)  SpO2: 100% (11-28-24 @ 21:53) (82% - 100%)    GENERAL: NAD, frail appearing  ENT: dry mucous membranes  CHEST/LUNG: Clear to auscultation bilaterally; No rales, rhonchi, wheezing, or rubs. Unlabored respirations  HEART: elevated rate  ABDOMEN: BSx4; Soft, nontender, nondistended  RECTAL: no melena nor hematochezia (chaperoned by JULIEN Rocha)  EXTREMITIES: No clubbing, cyanosis, or edema  NERVOUS SYSTEM:  A&Ox3

## 2024-11-28 NOTE — H&P ADULT - CONVERSATION DETAILS
I spoke with the patient's mother over the phone to discuss goals of care. O inquired about and explained the risks, benefits, and indications of resuscitation and intubation. Similarly, I also explained the risks and benefits of DNR and DNI, for which he/she expressed an understanding. She stated that her daughter will be DNR/DNI as she would not want to prolong her suffering.

## 2024-11-29 LAB
A1C WITH ESTIMATED AVERAGE GLUCOSE RESULT: 4.9 % — SIGNIFICANT CHANGE UP (ref 4–5.6)
ADD ON TEST-SPECIMEN IN LAB: SIGNIFICANT CHANGE UP
ADD ON TEST-SPECIMEN IN LAB: SIGNIFICANT CHANGE UP
ANION GAP SERPL CALC-SCNC: 11 MMOL/L — SIGNIFICANT CHANGE UP (ref 7–14)
BASOPHILS # BLD AUTO: 0 K/UL — SIGNIFICANT CHANGE UP (ref 0–0.2)
BASOPHILS NFR BLD AUTO: 0 % — SIGNIFICANT CHANGE UP (ref 0–2)
BUN SERPL-MCNC: 43 MG/DL — HIGH (ref 7–23)
CALCIUM SERPL-MCNC: 8.3 MG/DL — LOW (ref 8.4–10.5)
CHLORIDE SERPL-SCNC: 106 MMOL/L — SIGNIFICANT CHANGE UP (ref 98–107)
CO2 SERPL-SCNC: 23 MMOL/L — SIGNIFICANT CHANGE UP (ref 22–31)
CREAT SERPL-MCNC: 0.28 MG/DL — LOW (ref 0.5–1.3)
EGFR: 121 ML/MIN/1.73M2 — SIGNIFICANT CHANGE UP
EOSINOPHIL # BLD AUTO: 0 K/UL — SIGNIFICANT CHANGE UP (ref 0–0.5)
EOSINOPHIL NFR BLD AUTO: 0 % — SIGNIFICANT CHANGE UP (ref 0–6)
ESTIMATED AVERAGE GLUCOSE: 94 — SIGNIFICANT CHANGE UP
FERRITIN SERPL-MCNC: 45 NG/ML — SIGNIFICANT CHANGE UP (ref 13–330)
FOLATE SERPL-MCNC: >20 NG/ML — HIGH (ref 3.1–17.5)
GLUCOSE BLDC GLUCOMTR-MCNC: 119 MG/DL — HIGH (ref 70–99)
GLUCOSE BLDC GLUCOMTR-MCNC: 130 MG/DL — HIGH (ref 70–99)
GLUCOSE BLDC GLUCOMTR-MCNC: 133 MG/DL — HIGH (ref 70–99)
GLUCOSE BLDC GLUCOMTR-MCNC: 136 MG/DL — HIGH (ref 70–99)
GLUCOSE BLDC GLUCOMTR-MCNC: 137 MG/DL — HIGH (ref 70–99)
GLUCOSE BLDC GLUCOMTR-MCNC: 140 MG/DL — HIGH (ref 70–99)
GLUCOSE SERPL-MCNC: 135 MG/DL — HIGH (ref 70–99)
HAPTOGLOB SERPL-MCNC: 97 MG/DL — SIGNIFICANT CHANGE UP (ref 34–200)
HCT VFR BLD CALC: 20.9 % — CRITICAL LOW (ref 34.5–45)
HCT VFR BLD CALC: 26.8 % — LOW (ref 34.5–45)
HGB BLD-MCNC: 7 G/DL — CRITICAL LOW (ref 11.5–15.5)
HGB BLD-MCNC: 9.1 G/DL — LOW (ref 11.5–15.5)
IANC: 1.41 K/UL — LOW (ref 1.8–7.4)
IMM GRANULOCYTES NFR BLD AUTO: 0.9 % — SIGNIFICANT CHANGE UP (ref 0–0.9)
IRON SATN MFR SERPL: 22 UG/DL — LOW (ref 30–160)
IRON SATN MFR SERPL: 23 UG/DL — LOW (ref 30–160)
IRON SATN MFR SERPL: 8 % — LOW (ref 14–50)
LACTATE SERPL-SCNC: 1.5 MMOL/L — SIGNIFICANT CHANGE UP (ref 0.5–2)
LDH SERPL L TO P-CCNC: 184 U/L — SIGNIFICANT CHANGE UP (ref 135–225)
LYMPHOCYTES # BLD AUTO: 0.54 K/UL — LOW (ref 1–3.3)
LYMPHOCYTES # BLD AUTO: 24.3 % — SIGNIFICANT CHANGE UP (ref 13–44)
MAGNESIUM SERPL-MCNC: 2.3 MG/DL — SIGNIFICANT CHANGE UP (ref 1.6–2.6)
MCHC RBC-ENTMCNC: 29.9 PG — SIGNIFICANT CHANGE UP (ref 27–34)
MCHC RBC-ENTMCNC: 31.1 PG — SIGNIFICANT CHANGE UP (ref 27–34)
MCHC RBC-ENTMCNC: 33.5 G/DL — SIGNIFICANT CHANGE UP (ref 32–36)
MCHC RBC-ENTMCNC: 34 G/DL — SIGNIFICANT CHANGE UP (ref 32–36)
MCV RBC AUTO: 88.2 FL — SIGNIFICANT CHANGE UP (ref 80–100)
MCV RBC AUTO: 92.9 FL — SIGNIFICANT CHANGE UP (ref 80–100)
MONOCYTES # BLD AUTO: 0.25 K/UL — SIGNIFICANT CHANGE UP (ref 0–0.9)
MONOCYTES NFR BLD AUTO: 11.3 % — SIGNIFICANT CHANGE UP (ref 2–14)
MRSA PCR RESULT.: DETECTED
NEUTROPHILS # BLD AUTO: 1.41 K/UL — LOW (ref 1.8–7.4)
NEUTROPHILS NFR BLD AUTO: 63.5 % — SIGNIFICANT CHANGE UP (ref 43–77)
NRBC # BLD: 0 /100 WBCS — SIGNIFICANT CHANGE UP (ref 0–0)
NRBC # BLD: 0 /100 WBCS — SIGNIFICANT CHANGE UP (ref 0–0)
NRBC # FLD: 0 K/UL — SIGNIFICANT CHANGE UP (ref 0–0)
NRBC # FLD: 0.02 K/UL — HIGH (ref 0–0)
PHOSPHATE SERPL-MCNC: 4 MG/DL — SIGNIFICANT CHANGE UP (ref 2.5–4.5)
PLATELET # BLD AUTO: 135 K/UL — LOW (ref 150–400)
PLATELET # BLD AUTO: 152 K/UL — SIGNIFICANT CHANGE UP (ref 150–400)
POTASSIUM SERPL-MCNC: 4.2 MMOL/L — SIGNIFICANT CHANGE UP (ref 3.5–5.3)
POTASSIUM SERPL-SCNC: 4.2 MMOL/L — SIGNIFICANT CHANGE UP (ref 3.5–5.3)
PROCALCITONIN SERPL-MCNC: 0.23 NG/ML — HIGH (ref 0.02–0.1)
RBC # BLD: 2.25 M/UL — LOW (ref 3.8–5.2)
RBC # BLD: 2.25 M/UL — LOW (ref 3.8–5.2)
RBC # BLD: 3.04 M/UL — LOW (ref 3.8–5.2)
RBC # FLD: 21.8 % — HIGH (ref 10.3–14.5)
RBC # FLD: 22.5 % — HIGH (ref 10.3–14.5)
RETICS #: 142.2 K/UL — HIGH (ref 25–125)
RETICS/RBC NFR: 6.3 % — HIGH (ref 0.5–2.5)
S AUREUS DNA NOSE QL NAA+PROBE: DETECTED
SODIUM SERPL-SCNC: 140 MMOL/L — SIGNIFICANT CHANGE UP (ref 135–145)
TIBC SERPL-MCNC: 287 UG/DL — SIGNIFICANT CHANGE UP (ref 220–430)
UIBC SERPL-MCNC: 265 UG/DL — SIGNIFICANT CHANGE UP (ref 110–370)
VIT B12 SERPL-MCNC: 756 PG/ML — SIGNIFICANT CHANGE UP (ref 200–900)
WBC # BLD: 2.22 K/UL — LOW (ref 3.8–10.5)
WBC # BLD: 4.41 K/UL — SIGNIFICANT CHANGE UP (ref 3.8–10.5)
WBC # FLD AUTO: 2.22 K/UL — LOW (ref 3.8–10.5)
WBC # FLD AUTO: 4.41 K/UL — SIGNIFICANT CHANGE UP (ref 3.8–10.5)

## 2024-11-29 PROCEDURE — 71045 X-RAY EXAM CHEST 1 VIEW: CPT | Mod: 26

## 2024-11-29 PROCEDURE — 43255 EGD CONTROL BLEEDING ANY: CPT | Mod: GC

## 2024-11-29 PROCEDURE — 99233 SBSQ HOSP IP/OBS HIGH 50: CPT

## 2024-11-29 PROCEDURE — 99222 1ST HOSP IP/OBS MODERATE 55: CPT | Mod: 25

## 2024-11-29 RX ORDER — GLUCAGON INJECTION, SOLUTION 0.5 MG/.1ML
1 INJECTION, SOLUTION SUBCUTANEOUS ONCE
Refills: 0 | Status: DISCONTINUED | OUTPATIENT
Start: 2024-11-29 | End: 2024-12-04

## 2024-11-29 RX ORDER — IPRATROPIUM BROMIDE AND ALBUTEROL SULFATE 2.5; .5 MG/3ML; MG/3ML
3 SOLUTION RESPIRATORY (INHALATION) EVERY 6 HOURS
Refills: 0 | Status: DISCONTINUED | OUTPATIENT
Start: 2024-11-29 | End: 2024-12-04

## 2024-11-29 RX ORDER — 0.9 % SODIUM CHLORIDE 0.9 %
1000 INTRAVENOUS SOLUTION INTRAVENOUS
Refills: 0 | Status: DISCONTINUED | OUTPATIENT
Start: 2024-11-29 | End: 2024-12-04

## 2024-11-29 RX ORDER — CHLORHEXIDINE GLUCONATE 1.2 MG/ML
1 RINSE ORAL
Refills: 0 | Status: DISCONTINUED | OUTPATIENT
Start: 2024-11-29 | End: 2024-12-04

## 2024-11-29 RX ORDER — LORAZEPAM 2 MG/1
0.5 TABLET ORAL ONCE
Refills: 0 | Status: COMPLETED | OUTPATIENT
Start: 2024-11-29 | End: 2024-11-29

## 2024-11-29 RX ORDER — MIDODRINE HYDROCHLORIDE 5 MG/1
10 TABLET ORAL THREE TIMES A DAY
Refills: 0 | Status: DISCONTINUED | OUTPATIENT
Start: 2024-11-29 | End: 2024-11-30

## 2024-11-29 RX ORDER — HYDROCORTISONE ACETATE 25 MG/ML
50 VIAL (ML) INJECTION EVERY 8 HOURS
Refills: 0 | Status: DISCONTINUED | OUTPATIENT
Start: 2024-11-29 | End: 2024-11-30

## 2024-11-29 RX ADMIN — ACETAMINOPHEN 500MG 650 MILLIGRAM(S): 500 TABLET, COATED ORAL at 21:09

## 2024-11-29 RX ADMIN — CHLORHEXIDINE GLUCONATE 1 APPLICATION(S): 1.2 RINSE ORAL at 18:09

## 2024-11-29 RX ADMIN — Medication 1 APPLICATION(S): at 21:10

## 2024-11-29 RX ADMIN — PIPERACILLIN SODIUM AND TAZOBACTAM SODIUM 25 GRAM(S): 4; .5 INJECTION, POWDER, LYOPHILIZED, FOR SOLUTION INTRAVENOUS at 06:51

## 2024-11-29 RX ADMIN — ESCITALOPRAM OXALATE 20 MILLIGRAM(S): 10 TABLET, FILM COATED ORAL at 13:14

## 2024-11-29 RX ADMIN — TRAZODONE HYDROCHLORIDE 100 MILLIGRAM(S): 150 TABLET ORAL at 21:09

## 2024-11-29 RX ADMIN — Medication 0.5 MILLIGRAM(S): at 00:12

## 2024-11-29 RX ADMIN — Medication 50 MILLIGRAM(S): at 22:08

## 2024-11-29 RX ADMIN — Medication 20 MILLIGRAM(S): at 18:04

## 2024-11-29 RX ADMIN — SODIUM CHLORIDE 75 MILLILITER(S): 9 INJECTION, SOLUTION INTRAMUSCULAR; INTRAVENOUS; SUBCUTANEOUS at 06:52

## 2024-11-29 RX ADMIN — PIPERACILLIN SODIUM AND TAZOBACTAM SODIUM 25 GRAM(S): 4; .5 INJECTION, POWDER, LYOPHILIZED, FOR SOLUTION INTRAVENOUS at 13:25

## 2024-11-29 RX ADMIN — LAMOTRIGINE 100 MILLIGRAM(S): 50 TABLET, EXTENDED RELEASE ORAL at 18:05

## 2024-11-29 RX ADMIN — PANTOPRAZOLE SODIUM 40 MILLIGRAM(S): 40 TABLET, DELAYED RELEASE ORAL at 06:51

## 2024-11-29 RX ADMIN — Medication 100 MILLIGRAM(S): at 06:51

## 2024-11-29 RX ADMIN — PANTOPRAZOLE SODIUM 40 MILLIGRAM(S): 40 TABLET, DELAYED RELEASE ORAL at 17:59

## 2024-11-29 RX ADMIN — LAMOTRIGINE 100 MILLIGRAM(S): 50 TABLET, EXTENDED RELEASE ORAL at 06:51

## 2024-11-29 RX ADMIN — Medication 20 MILLIGRAM(S): at 21:09

## 2024-11-29 RX ADMIN — GABAPENTIN 300 MILLIGRAM(S): 300 CAPSULE ORAL at 06:51

## 2024-11-29 RX ADMIN — GABAPENTIN 300 MILLIGRAM(S): 300 CAPSULE ORAL at 21:09

## 2024-11-29 RX ADMIN — PIPERACILLIN SODIUM AND TAZOBACTAM SODIUM 25 GRAM(S): 4; .5 INJECTION, POWDER, LYOPHILIZED, FOR SOLUTION INTRAVENOUS at 21:10

## 2024-11-29 RX ADMIN — Medication 20 MILLIGRAM(S): at 06:51

## 2024-11-29 RX ADMIN — Medication 50 MILLIGRAM(S): at 13:15

## 2024-11-29 RX ADMIN — MIDODRINE HYDROCHLORIDE 10 MILLIGRAM(S): 5 TABLET ORAL at 13:09

## 2024-11-29 NOTE — PROGRESS NOTE ADULT - SUBJECTIVE AND OBJECTIVE BOX
Patient is a 64y old  Female who presents with a chief complaint of anemia, hypotension (2024 23:28)      ======Overnight/Subjective======  Overnight    Subjective    Brief daily plan    ======Medications======  MEDICATIONS  (STANDING):  amphetamine/dextroamphetamine XR 20 milliGRAM(s) Oral with breakfast  atorvastatin 20 milliGRAM(s) Oral at bedtime  baclofen 20 milliGRAM(s) Oral every 12 hours  dextrose 5%. 1000 milliLiter(s) (50 mL/Hr) IV Continuous <Continuous>  dextrose 5%. 1000 milliLiter(s) (100 mL/Hr) IV Continuous <Continuous>  dextrose 50% Injectable 25 Gram(s) IV Push once  dextrose 50% Injectable 12.5 Gram(s) IV Push once  dextrose 50% Injectable 25 Gram(s) IV Push once  escitalopram 20 milliGRAM(s) Oral daily  gabapentin 300 milliGRAM(s) Oral every 8 hours  glucagon  Injectable 1 milliGRAM(s) IntraMuscular once  hydrocortisone sodium succinate Injectable 100 milliGRAM(s) IV Push every 8 hours  insulin lispro (ADMELOG) corrective regimen sliding scale   SubCutaneous three times a day before meals  insulin lispro (ADMELOG) corrective regimen sliding scale   SubCutaneous at bedtime  lamoTRIgine 100 milliGRAM(s) Oral two times a day  pantoprazole  Injectable 40 milliGRAM(s) IV Push every 12 hours  piperacillin/tazobactam IVPB.. 3.375 Gram(s) IV Intermittent every 8 hours  sodium chloride 0.9%. 1000 milliLiter(s) (75 mL/Hr) IV Continuous <Continuous>  traZODone 100 milliGRAM(s) Oral at bedtime    MEDICATIONS  (PRN):  acetaminophen     Tablet .. 650 milliGRAM(s) Oral every 6 hours PRN Temp greater or equal to 38C (100.4F), Mild Pain (1 - 3)  ALPRAZolam 0.5 milliGRAM(s) Oral two times a day PRN anxiety  dextrose Oral Gel 15 Gram(s) Oral once PRN Blood Glucose LESS THAN 70 milliGRAM(s)/deciliter      ======Vital Signs======  T(C): 37.7 (24 @ 00:15), Max: 37.7 (24 @ 00:15)  T(F): 99.9 (24 @ 00:15), Max: 99.9 (24 @ 00:15)  HR: 102 (24 @ 00:15) (102 - 119)  BP: 105/60 (24 @ 00:15) (74/40 - 122/73)  BP(mean): 82 (24 @ 19:00) (46 - 85)  RR: 17 (24 @ 00:15) (14 - 25)  SpO2: 100% (24 @ 00:15) (82% - 100%)    ======Physical exams======  GENERAL: NAD  Cardiovascular: RRR, S1 S2, no m/r/g, no JVD  LUNGS: Unlabored respiration, CTABL  ABDOMEN: Soft, NTND  EXTREMITIES: Warm extremities, no edema, peripheral pulses 2+ bilaterally  NEURO: AAOx3, PERRLA    ======Labs======                7.0[LL]  4.41 >----------< 152  (MCV: 92.9)                20.9[LL]   137 | 99 | 95[H]  -----------------------< 170[H]  5.4[H] | 17[L] | 0.60    TPro: 5.5[L] / Alb: 3.4 / TBili: 0.4 / DBili: -- / AlkPhos: 66 / ALT: 21 / AST: 23 (24 @ 08:45)  Ca: 8.6 / Phos: -- / Mg: -- (24 @ 08:45)    Gas: 7.31[L] / 42 / 36 / 21[L] / 64.8[L]% / -4.8[L] (24 @ 17:49)  PT/INR - ( 2024 11:37 )   PT: 14.1 sec;   INR: 1.19 ratio         PTT - ( 2024 11:37 )  PTT:26.8 sec    ======Microbiology======  Urinalysis Basic - ( 2024 11:06 )    Color: Yellow / Appearance: Clear / S.051 / pH: x  Gluc: x / Ketone: Negative mg/dL  / Bili: Negative / Urobili: 0.2 mg/dL   Blood: x / Protein: Negative mg/dL / Nitrite: Negative   Leuk Esterase: Small / RBC: 2 /HPF / WBC 4 /HPF   Sq Epi: x / Non Sq Epi: 1 /HPF / Bacteria: Negative /HPF        Urinalysis with Rflx Culture (collected 2024 11:06)        ======I&O's======  I&O's Summary       Patient is a 64y old  Female who presents with a chief complaint of anemia, hypotension (2024 23:28)      ======Overnight/Subjective======  Admitted overnight for anemia and hypotension. In ED, received fluids, vanc/zosyn, one unit pRBC, solu-cortef, levophed. Now off levophed.    This AM, patient reports feeling weak. Denies palpitations at this time, chest pain, fever/chills, N/V, stool changes.    Brief daily plan  - f/u GI recs, possible EGD today  - TTE, repeat EKG  - discontinue fluids, reduce solu-cortef to 50 q8h, start midodrine  - Give an additional unit of pRBC (h/h 7.0/20.9)    ======Medications======  MEDICATIONS  (STANDING):  amphetamine/dextroamphetamine XR 20 milliGRAM(s) Oral with breakfast  atorvastatin 20 milliGRAM(s) Oral at bedtime  baclofen 20 milliGRAM(s) Oral every 12 hours  dextrose 5%. 1000 milliLiter(s) (50 mL/Hr) IV Continuous <Continuous>  dextrose 5%. 1000 milliLiter(s) (100 mL/Hr) IV Continuous <Continuous>  dextrose 50% Injectable 25 Gram(s) IV Push once  dextrose 50% Injectable 12.5 Gram(s) IV Push once  dextrose 50% Injectable 25 Gram(s) IV Push once  escitalopram 20 milliGRAM(s) Oral daily  gabapentin 300 milliGRAM(s) Oral every 8 hours  glucagon  Injectable 1 milliGRAM(s) IntraMuscular once  hydrocortisone sodium succinate Injectable 100 milliGRAM(s) IV Push every 8 hours  insulin lispro (ADMELOG) corrective regimen sliding scale   SubCutaneous three times a day before meals  insulin lispro (ADMELOG) corrective regimen sliding scale   SubCutaneous at bedtime  lamoTRIgine 100 milliGRAM(s) Oral two times a day  pantoprazole  Injectable 40 milliGRAM(s) IV Push every 12 hours  piperacillin/tazobactam IVPB.. 3.375 Gram(s) IV Intermittent every 8 hours  sodium chloride 0.9%. 1000 milliLiter(s) (75 mL/Hr) IV Continuous <Continuous>  traZODone 100 milliGRAM(s) Oral at bedtime    MEDICATIONS  (PRN):  acetaminophen     Tablet .. 650 milliGRAM(s) Oral every 6 hours PRN Temp greater or equal to 38C (100.4F), Mild Pain (1 - 3)  ALPRAZolam 0.5 milliGRAM(s) Oral two times a day PRN anxiety  dextrose Oral Gel 15 Gram(s) Oral once PRN Blood Glucose LESS THAN 70 milliGRAM(s)/deciliter      ======Vital Signs======  T(C): 37.7 (24 @ 00:15), Max: 37.7 (24 @ 00:15)  T(F): 99.9 (24 @ 00:15), Max: 99.9 (24 @ 00:15)  HR: 102 (24 @ 00:15) (102 - 119)  BP: 105/60 (24 @ 00:15) (74/40 - 122/73)  BP(mean): 82 (24 @ 19:00) (46 - 85)  RR: 17 (24 @ 00:15) (14 - 25)  SpO2: 100% (24 @ 00:15) (82% - 100%)    ======Physical exams======  GENERAL: NAD  Psych: AAOx3. Bizarre affect, soft spoken, tangential. Not acutely manic nor psychotic. No AVH.  Cardiovascular: Tachycardic, S1 S2, no m/r/g, no JVD  LUNGS: Unlabored respiration, CTABL  ABDOMEN: Soft, NTND  EXTREMITIES: Warm extremities, no edema, peripheral pulses 2+ bilaterally    ======Labs======                7.0[LL]  4.41 >----------< 152  (MCV: 92.9)                20.9[LL]   137 | 99 | 95[H]  -----------------------< 170[H]  5.4[H] | 17[L] | 0.60    TPro: 5.5[L] / Alb: 3.4 / TBili: 0.4 / DBili: -- / AlkPhos: 66 / ALT: 21 / AST: 23 (24 @ 08:45)  Ca: 8.6 / Phos: -- / Mg: -- (24 @ 08:45)    Gas: 7.31[L] / 42 / 36 / 21[L] / 64.8[L]% / -4.8[L] (24 @ 17:49)  PT/INR - ( 2024 11:37 )   PT: 14.1 sec;   INR: 1.19 ratio         PTT - ( 2024 11:37 )  PTT:26.8 sec    ======Microbiology======  Urinalysis Basic - ( 2024 11:06 )    Color: Yellow / Appearance: Clear / S.051 / pH: x  Gluc: x / Ketone: Negative mg/dL  / Bili: Negative / Urobili: 0.2 mg/dL   Blood: x / Protein: Negative mg/dL / Nitrite: Negative   Leuk Esterase: Small / RBC: 2 /HPF / WBC 4 /HPF   Sq Epi: x / Non Sq Epi: 1 /HPF / Bacteria: Negative /HPF        Urinalysis with Rflx Culture (collected 2024 11:06)        ======I&O's======  I&O's Summary

## 2024-11-29 NOTE — PROGRESS NOTE ADULT - PROBLEM SELECTOR PLAN 4
hx of MS and no longer taking Vumerity due to leukopenia   continue with gabapentin, lamictal, and baclofen hx of MS and no longer taking Vumerity due to leukopenia     - continue with gabapentin, lamictal, and baclofen

## 2024-11-29 NOTE — PROVIDER CONTACT NOTE (CRITICAL VALUE NOTIFICATION) - ASSESSMENT
Patient received A&Ox4. NSR on telemetry. Patient denies chest pain, shortness of breath, or generalized pain. Pt satting well on RA.

## 2024-11-29 NOTE — CONSULT NOTE ADULT - ATTENDING COMMENTS
64F hx MS, femoral fx s/p hip arthroplasty, hx of trigeminal neuralgia, presenting for evaluation of palpitations.   Found to have a hgb of 5.5 on labs, got one unit and responded to 7.3 with one unit.   +excedrin for daily headaches  remoted hx of colonoscopy, no prior EGD  brown stool reported on fellow exam  labs reviewed. on post transfusion labs, ferritin 45, iron 23, % sat 8  patient with symptomatic iron deficiency anemia with +NSAIDs as risk factor for PUD  Will plan for EGD to further assess.

## 2024-11-29 NOTE — CONSULT NOTE ADULT - SUBJECTIVE AND OBJECTIVE BOX
INITIAL GI CONSULTATION  HPI:  65 yo F with PMHx MS, F. femoral fx s/p hip arthroplasty, hx of trigeminal neuralgia presenting w/ palpitations found to be anemia to 5s. GI consulted for anemia and c/f GIB.     She presented with palpitations at home and after telling her aides she had palpitations was brought in for further evaluation. She denies any recent black stool or hematochezia. She uses excedrin almost daily for migraines. No other NSAIDs, AC, or antiplatets. She has been off on medications for MS for several years. She reports 2 prior colonoscopies- one many years ago in setting colitis and one at age 41 that was reportedly normal. She never had an endoscopy.     On arrival reportedly hypotensive in ED to 80s/50s, briefly requiring levophed. Labs showed Hb 5.5 from b/l 11 in 2018. MCV of 104.2. Other labs notable for BUN/Cr 96/0.6. Iron studies showing ferritin 30. Was started on Vanc/zosyn. Had CT A/P showing proctitis.       FamHx: ***no h/o GI malignancies known  PMH/PSH:  PAST MEDICAL & SURGICAL HISTORY:  Multiple Sclerosis      Neuralgia      Depression      No significant past surgical history          MEDS:  MEDICATIONS  (STANDING):  amphetamine/dextroamphetamine XR 20 milliGRAM(s) Oral with breakfast  atorvastatin 20 milliGRAM(s) Oral at bedtime  baclofen 20 milliGRAM(s) Oral every 12 hours  dextrose 5%. 1000 milliLiter(s) (50 mL/Hr) IV Continuous <Continuous>  dextrose 5%. 1000 milliLiter(s) (100 mL/Hr) IV Continuous <Continuous>  dextrose 50% Injectable 25 Gram(s) IV Push once  dextrose 50% Injectable 12.5 Gram(s) IV Push once  dextrose 50% Injectable 25 Gram(s) IV Push once  escitalopram 20 milliGRAM(s) Oral daily  gabapentin 300 milliGRAM(s) Oral every 8 hours  glucagon  Injectable 1 milliGRAM(s) IntraMuscular once  hydrocortisone sodium succinate Injectable 50 milliGRAM(s) IV Push every 8 hours  insulin lispro (ADMELOG) corrective regimen sliding scale   SubCutaneous three times a day before meals  insulin lispro (ADMELOG) corrective regimen sliding scale   SubCutaneous at bedtime  lamoTRIgine 100 milliGRAM(s) Oral two times a day  midodrine. 10 milliGRAM(s) Oral three times a day  pantoprazole  Injectable 40 milliGRAM(s) IV Push every 12 hours  piperacillin/tazobactam IVPB.. 3.375 Gram(s) IV Intermittent every 8 hours  traZODone 100 milliGRAM(s) Oral at bedtime    MEDICATIONS  (PRN):  acetaminophen     Tablet .. 650 milliGRAM(s) Oral every 6 hours PRN Temp greater or equal to 38C (100.4F), Mild Pain (1 - 3)  ALPRAZolam 0.5 milliGRAM(s) Oral two times a day PRN anxiety  dextrose Oral Gel 15 Gram(s) Oral once PRN Blood Glucose LESS THAN 70 milliGRAM(s)/deciliter    Allergies    No Known Allergies    Intolerances          ______________________________________________________________________  PHYSICAL EXAM:  T(C): 36.3 (24 @ 06:30), Max: 37.7 (24 @ 00:15)  HR: 105 (24 @ 06:30)  BP: 105/58 (24 @ 06:30)  RR: 18 (24 @ 06:30)  SpO2: 100% (24 @ 06:30)  Wt(kg): --    GEN: NAD, normocephalic  CVS: Normal rate, HD stable  CHEST: No signs of respiratory distress, breathing comfortably, no accessory muscle usage  ABD: soft , nontender, nondistended, brown palpable stool on rectal  EXTR: no cyanosis, no clubbing, no edema  NEURO: Awake and alert, conversant  SKIN:  warm;  non icteric      Labs/Imaging reviewed.                        7.0    4.41  )-----------( 152      ( 2024 06:36 )             20.9     Last Hb:Hemoglobin: 7.0 g/dL (24 @ 06:36)  Hemoglobin: 7.3 g/dL (24 @ 18:23)  Hemoglobin: 5.5 g/dL (24 @ 08:45)               140   |  106   |  43                 Ca: 8.3    BMP:   ----------------------------< 135    M.30  (24 @ 06:36)             4.2    |  23    | 0.28               Ph: 4.0      LFT:     TPro: 5.5 / Alb: 3.4 / TBili: 0.4 / DBili: x / AST: 23 / ALT: 21 / AlkPhos: 66   (24 @ 08:45)    Creatinine: 0.28 mg/dL  Creatinine: 0.60 mg/dL      BUN/Cr: Blood Urea Nitrogen: 43 mg/dL (24 @ 06:36)  /Creatinine: 0.28 mg/dL (24 @ 06:36)    AST/ALTAspartate Aminotransferase (AST/SGOT): 23 U/L (24 @ 08:45)  /Alanine Aminotransferase (ALT/SGPT): 21 U/L (24 @ 08:45)    ALP Alkaline Phosphatase: 66 U/L (24 @ 08:45)    T/Dbili /  INR: INR: 1.19 ratio (24 @ 11:37)      EGD/[place]:      Imaging:  CT Abdomen and Pelvis w/ IV Cont:   ACC: [id number] EXAM:  CT ABDOMEN AND PELVIS IC   ORDERED BY: DOROTA PHILIPPE     ACC: [id number] [facility]   ORDERED BY: DOROTA PHILIPPE     PROCEDURE DATE:  2024          INTERPRETATION:  CLINICAL INFORMATION: Shortness of breath, vomiting, and   tachycardia.    COMPARISON: CT chest 2023. CT abdomen pelvis 2011.    CONTRAST/COMPLICATIONS:  IV Contrast: Omnipaque 350  90 cc administered   10 cc discarded  Oral Contrast: NONE  Complications: None reported at study completion    PROCEDURE:  CT Angiography of the Chest was performed followed by portal venous phase   imaging of the Abdomen and Pelvis.  Sagittal and coronal reformats were performed as well as 3D (MIP)   reconstructions.    FINDINGS:  CHEST:  LUNGS AND LARGE AIRWAYS: Patent central airways. Right lower lobe linear   atelectasis.  PLEURA: No pleural effusion.  VESSELS: No pulmonary embolism. Coronary artery calcifications.  HEART: Heart size is normal. No pericardial effusion.  MEDIASTINUM AND SHAHRIAR: No lymphadenopathy.  CHEST WALL AND LOWER NECK: A 1.5 cm hypoattenuating right thyroid nodule.    ABDOMEN AND PELVIS:  Limited evaluation secondary to paucity of intraperitoneal fat.  LIVER: Within normal limits.  BILE DUCTS: Normal caliber.  GALLBLADDER: Within normal limits.  SPLEEN: Within normal limits.  PANCREAS: Within normal limits.  ADRENALS: Within normal limits.  KIDNEYS/URETERS: Within normal limits.    Evaluation of pelvic organs is limited by streak artifact from right hip   prosthesis.  BLADDER: Layering hyperdensity within the left posterolateral urinary   bladder.  REPRODUCTIVE ORGANS: No uterine mass.    BOWEL: Moderate colorectal stool burden with rectosigmoid wall thickening   Appendix is normal.  PERITONEUM/RETROPERITONEUM: Within normal limits.  VESSELS: Atherosclerotic changes.  LYMPH NODES: No lymphadenopathy.  ABDOMINAL WALL: Within normal limits.  BONES: Right total hip arthroplasty. Degenerative changes. T5-T6 facet   joint arthrosis. Lumbar levoscoliosis.    IMPRESSION:  No pulmonary embolism.    Proctitis.    Layering hyperdensity within the left posterolateral urinary bladder, may   represent urinary stones.    --- End of Report ---      
Patient:  ALEXY STRATTON  [id number]    CHIEF COMPLAINT: Dyspnea    HPI:  Patient is a 65 y/o female with PMHx of MS presenting for dyspnea and vomiting. Patient found to have abdominal pain, tachycardia, and hypotension in ED. MICU consulted for hypotension requiring pressors. Labs significant for Hb 5.5, FOBT positive, lactic acidosis. On CT, stomach noted to have >1L of fluid and distended. NG tube placed in ED brought up brown stomach contents low concern for blood in stomach. Patient reports pain in her back and shoulder. Otherwise states she has been experiencing dyspnea and tachycardia for a few days. Denies chest pain, diarrhea, constipation, dysuria.     PAST MEDICAL & SURGICAL HISTORY:  Multiple Sclerosis      Neuralgia      Depression      No significant past surgical history          FAMILY HISTORY:  Family history of hypertension (Father, Mother)    Family history of hypercholesterolemia (Father)    Family history of breast cancer (Mother)        SOCIAL HISTORY:    Allergies    No Known Allergies    Intolerances        HOME MEDICATIONS:  ALEXY STRATTON    REVIEW OF SYSTEMS:  ROS otherwise negative except as noted in HPI.    OBJECTIVE:  T(F): 98 (11-28-24 @ 14:05), Max: 98 (11-28-24 @ 13:50)  HR: 112 (11-28-24 @ 14:05) (104 - 119)  BP: 87/52 (11-28-24 @ 14:05) (74/40 - 121/100)  BP(mean): 62 (11-28-24 @ 14:05) (46 - 76)  ABP: --  ABP(mean): --  RR: 18 (11-28-24 @ 14:05) (18 - 25)  SpO2: 100% (11-28-24 @ 14:05) (82% - 100%)  CVP(mm Hg): --    I/O Summary 24H    CAPILLARY BLOOD GLUCOSE          PHYSICAL EXAM:  GENERAL: Mildly uncomfortable; thin; very dry appearing  EYES: EOMI, PERRLA, pale conjunctiva  HEART: Tachycardic, no murmurs, rubs, or gallops; no edema  LUNGS: Unlabored respirations.  Clear to auscultation bilaterally, no crackles, wheezing, or rhonchi  ABDOMEN: Soft, nontender, mild distention, +BS  NERVOUS SYSTEM:  A&Ox2, perseverating, no focal deficits     HOSPITAL MEDICATIONS:  MEDICATIONS  (STANDING):  hydrocortisone sodium succinate Injectable 100 milliGRAM(s) IV Push every 8 hours  norepinephrine Infusion 0.05 MICROgram(s)/kG/Min (4.22 mL/Hr) IV Continuous <Continuous>    MEDICATIONS  (PRN):      LABS:  CBC 11-28-24 @ 08:45                        5.5    4.86  )-----------( 214                   17.5     Hgb trend: 5.5 <--   WBC trend: 4.86 <--     CMP 11-28-24 @ 08:45    137  |  99  |  95[H]  ----------------------------<  170[H]  5.4[H]   |  17[L]  |  0.60    Ca    8.6      11-28-24 @ 08:45    TPro  5.5[L]  /  Alb  3.4  /  TBili  0.4  /  DBili  x   /  AST  23  /  ALT  21  /  AlkPhos  66     11-28    Serum Cr (eGFR) trend: 0.60 (101) <--     PT/INR - ( 28 Nov 2024 11:37 )   PT: 14.1 sec;   INR: 1.19 ratio    PTT - ( 28 Nov 2024 11:37 ):26.8 sec    ABG Trend:     VBG Trend:   11-28-24 @ 08:45 - pH: 7.20  | pCO2: 45    | pO2: 35    | HCO3: 18    | Lactate: 7.5        RADIOLOGY:  [X] Reviewed and interpreted by me

## 2024-11-29 NOTE — PROGRESS NOTE ADULT - PROBLEM SELECTOR PLAN 6
Diet: NPO  Dispo: pending  DVT ppx: SCDs Diet: NPO  Dispo: pending additional workup  Code: Full  DVT ppx: SCDs

## 2024-11-29 NOTE — CHART NOTE - NSCHARTNOTEFT_GEN_A_CORE
A	Y	S	11/19/2024	11/23/2024	dextroamp-amphet er 20 mg cap	30	30	Ganzer, Clarisse	[id number]	Medicare	[id number]  A	N	S	08/23/2024	08/23/2024	dextroamp-amphet er 20 mg cap	90	90	Ganzer, Clarisse	[id number]	Medicare	[id number]  A	N	S	07/18/2024	07/24/2024	dextroamp-amphet er 20 mg cap	30	30	HerreraKevan V ([facility])	[id number]	Medicare	[id number]  A	N	B	07/10/2024	07/12/2024	alprazolam 0.5 mg tablet	90	30	Ganzer, Clarisse	[id number]	Medicare	[id number]  A	N	S	06/18/2024	06/21/2024	dextroamp-amphet er 20 mg cap	30	30	Ganzer, Clarisse	[id number]	Medicare	[id number]  A	N	S	05/13/2024	05/18/2024	dextroamp-amphet er 20 mg cap	30	30	Ganzer, Clarisse	[id number]	Medicare	[id number]  A	N	S	04/16/2024	04/19/2024	dextroamp-amphet er 20 mg cap	30	30	Ganzer, Clarisse	[id number]	Medicare	[id number]  A	N	S	03/12/2024	03/16/2024	dextroamp-amphet er 20 mg cap	30	30	Ganzer, Clarisse	[id number]	Medicare	[id number]  A	N	S	02/09/2024	02/12/2024	dextroamp-amphet er 20 mg cap	30	30	Ganzer, Clarisse	[id number]	Medicare	[id number]  A	N	S	01/09/2024	01/12/2024	dextroamp-amphet er 20 mg cap	30	30	Ganzer, Clarisse	[id number]	Medicare	[id number]  A	N	S	12/08/2023	12/09/2023	dextroamp-amphet er 20 mg cap	30	30	Ganzer, Clarisse	[id number]	Medicare	[id number]

## 2024-11-29 NOTE — PROGRESS NOTE ADULT - PROBLEM SELECTOR PLAN 2
Hb 5.5 on admission s/p one unit pRBC transfusion with repeat Hb>7  may be due to GI bleed  obtain iron studies and hemolytic workup  also send B12 and folate as MCV>100 on admission  maintain 2 large bore IVs and PPI BID  maintain active T/S  also continue with ng tube Hb 5.5 on admission s/p one unit pRBC transfusion with repeat Hb>7  may be due to GI bleed    - obtain iron studies and hemolytic workup  - B12 WNL, Folate supratherapeutic  - maintain 2 large bore IVs and PPI BID  - maintain active T/S  - addition 1 unit pRBC AM on 11/29

## 2024-11-29 NOTE — RAPID RESPONSE TEAM SUMMARY - NSSITUATIONBACKGROUNDRRT_GEN_ALL_CORE
63 yo F with pmhx of MS and R femoral fx s/p hip arthroplasty comes to the ED after experiencing palpitations. On admission, vitals significant for hypotension and tachycardia with labs showing hemoglobin 5.5 and a lactate>7 s/p fluid resuscitation, one unit pRBC transfusion, solu-cortef, and levophed (now off). She is admitted for further management. Rapid called for hypoxia and agitation. Per primary team, someone spoke to patient, after which she was very upset and agitated. At this time, pulse oximetry showed O2 sat 75% (unknown waveform), so patient placed on NRB and rapid called.  On exam, patient very agitated, moving in bed. Pulse ox waveform very inconsistent. BPs stable at ~100/60. Patient received 0.5mg ativan IV to good effect. Subsequently, O2 sat 100% on NRB with good waveform. Patient transitioned to RA with resulting O2 sat 100%. Rapid ended.

## 2024-11-29 NOTE — PROGRESS NOTE ADULT - PROBLEM SELECTOR PLAN 3
CT scan showing proctitis which may be source of sepsis  continue with zosyn  bowel regimen CT scan showing proctitis which may be source of sepsis    - continue with zosyn  - bowel regimen

## 2024-11-29 NOTE — PROGRESS NOTE ADULT - PROBLEM SELECTOR PLAN 1
patient hypotensive on admission  could be due to hypovolemic/hemorrhagic in setting of possible GI bleed given anemia on presentation  may also be due to septic shock, source can be proctitis   s/p fluid and pRBC resuscitation in ED    PLAN  continue with zosyn  will also continue with solu-cortef and IV fluids    follow up infectious workup UCx and blood cx (RVP and UA -)  appreciate GI recs for concern of GI bleed  maintain 2 large bore IVs and PPI BID patient hypotensive on admission  could be due to hypovolemic/hemorrhagic in setting of possible GI bleed given anemia on presentation  may also be due to septic shock, source can be proctitis   s/p fluid and pRBC resuscitation in ED    PLAN  - continue with zosyn  - d/c fluids, reduce solu-cortef to 50 mg TID, start midodrine  - follow up infectious workup UCx and blood cx (RVP and UA -)  - TTE (BNP 1696) and repeat EKG for workup of palpitations  - appreciate GI recs for concern of GI bleed  - maintain 2 large bore IVs and PPI BID

## 2024-11-29 NOTE — CONSULT NOTE ADULT - ASSESSMENT
63 yo F with PMHx MS, F. femoral fx s/p hip arthroplasty, hx of trigeminal neuralgia presenting w/ palpitations found to be anemia to 5s. GI consulted for anemia and c/f GIB.     #Hx MS  #Hx Trigeminal neuralgia  #Migraines  #Macrocytic anemia  GI consulted for reports of melena and anemia on presentation initiatlly requiring pressor support after patient presented with palpiatations. EKG with sinus tach. Hb 5.5 on admission from b/l 11 in 2018- suspect multifactorial 2/2 ?B12 vs. folat deficiency (given elevated MCV) though w/ ferritin of 30 and elevated BUN/Cr in setting heavy NSAID use also raising concern for possible UGIB as source (despite brown stool on rectal). Though oozing of UGI system may be at play do not suspect significant GI losses.  Recommendations:  -Can plan for possible endoscopy today if pt amenable- will discuss with her again  -please add on %sat to pre-transfusion labs  -Folate/B12 levels  -Keep NPO  -Transfuse to maintain Hb>7  -active T&S  -2 large bore IVs  -Agree w/ continued vanc/zosyn    Note incomplete until finalized by attending signature/attestation.    Sary Shell  GI/Hepatology Fellow PGY5    NON-URGENT CONSULTS:  Please email [email or web address]  AT NIGHT AND ON WEEKENDS:  Available on [employer] Teams  [phone] ([employer] Pager)    For urgent consults, please contact on call GI team. See Amion schedule ([facility]) or [employer] paging system ([facility]) 65 yo F with PMHx MS, F. femoral fx s/p hip arthroplasty, hx of trigeminal neuralgia presenting w/ palpitations found to be anemia to 5s. GI consulted for anemia and c/f GIB.     #Hx MS  #Hx Trigeminal neuralgia  #Migraines  #Macrocytic anemia  GI consulted for reports of melena and anemia on presentation initiatlly requiring pressor support after patient presented with palpiatations. EKG with sinus tach. Hb 5.5 on admission from b/l 11 in 2018- suspect multifactorial 2/2 ?B12 vs. folat deficiency (given elevated MCV) though w/ ferritin of 30 and elevated BUN/Cr in setting heavy NSAID use also raising concern for possible UGIB as source (despite brown stool on rectal). Though oozing of UGI system may be at play do not suspect significant GI losses.  Recommendations:  -Can plan for possible endoscopy today if pt amenable- will discuss with her again  -PPI BID  -please add on %sat to pre-transfusion labs  -Folate/B12 levels  -Keep NPO  -Transfuse to maintain Hb>7  -active T&S  -2 large bore IVs  -Agree w/ continued vanc/zosyn    Note incomplete until finalized by attending signature/attestation.    Sary Shell  GI/Hepatology Fellow PGY5    NON-URGENT CONSULTS:  Please email [email or web address]  AT NIGHT AND ON WEEKENDS:  Available on [employer] Teams  [phone] ([employer] Pager)    For urgent consults, please contact on call GI team. See Amion schedule ([facility]) or [employer] paging system ([facility])

## 2024-11-30 LAB
ALBUMIN SERPL ELPH-MCNC: 3.1 G/DL — LOW (ref 3.3–5)
ALP SERPL-CCNC: 54 U/L — SIGNIFICANT CHANGE UP (ref 40–120)
ALT FLD-CCNC: 21 U/L — SIGNIFICANT CHANGE UP (ref 4–33)
ANION GAP SERPL CALC-SCNC: 12 MMOL/L — SIGNIFICANT CHANGE UP (ref 7–14)
AST SERPL-CCNC: 18 U/L — SIGNIFICANT CHANGE UP (ref 4–32)
BASOPHILS # BLD AUTO: 0 K/UL — SIGNIFICANT CHANGE UP (ref 0–0.2)
BASOPHILS NFR BLD AUTO: 0 % — SIGNIFICANT CHANGE UP (ref 0–2)
BILIRUB SERPL-MCNC: 0.4 MG/DL — SIGNIFICANT CHANGE UP (ref 0.2–1.2)
BUN SERPL-MCNC: 29 MG/DL — HIGH (ref 7–23)
CA-I BLD-SCNC: 0.99 MMOL/L — LOW (ref 1.15–1.29)
CALCIUM SERPL-MCNC: 8.1 MG/DL — LOW (ref 8.4–10.5)
CHLORIDE SERPL-SCNC: 109 MMOL/L — HIGH (ref 98–107)
CO2 SERPL-SCNC: 18 MMOL/L — LOW (ref 22–31)
CREAT SERPL-MCNC: 0.21 MG/DL — LOW (ref 0.5–1.3)
EGFR: 130 ML/MIN/1.73M2 — SIGNIFICANT CHANGE UP
EOSINOPHIL # BLD AUTO: 0 K/UL — SIGNIFICANT CHANGE UP (ref 0–0.5)
EOSINOPHIL NFR BLD AUTO: 0 % — SIGNIFICANT CHANGE UP (ref 0–6)
GLUCOSE BLDC GLUCOMTR-MCNC: 113 MG/DL — HIGH (ref 70–99)
GLUCOSE BLDC GLUCOMTR-MCNC: 114 MG/DL — HIGH (ref 70–99)
GLUCOSE BLDC GLUCOMTR-MCNC: 132 MG/DL — HIGH (ref 70–99)
GLUCOSE BLDC GLUCOMTR-MCNC: 152 MG/DL — HIGH (ref 70–99)
GLUCOSE SERPL-MCNC: 91 MG/DL — SIGNIFICANT CHANGE UP (ref 70–99)
HCT VFR BLD CALC: 24.9 % — LOW (ref 34.5–45)
HGB BLD-MCNC: 8.2 G/DL — LOW (ref 11.5–15.5)
IANC: 1.11 K/UL — LOW (ref 1.8–7.4)
IMM GRANULOCYTES NFR BLD AUTO: 0.4 % — SIGNIFICANT CHANGE UP (ref 0–0.9)
LACTATE SERPL-SCNC: 1.4 MMOL/L — SIGNIFICANT CHANGE UP (ref 0.5–2)
LYMPHOCYTES # BLD AUTO: 0.8 K/UL — LOW (ref 1–3.3)
LYMPHOCYTES # BLD AUTO: 34 % — SIGNIFICANT CHANGE UP (ref 13–44)
MAGNESIUM SERPL-MCNC: 2.2 MG/DL — SIGNIFICANT CHANGE UP (ref 1.6–2.6)
MCHC RBC-ENTMCNC: 29.8 PG — SIGNIFICANT CHANGE UP (ref 27–34)
MCHC RBC-ENTMCNC: 32.9 G/DL — SIGNIFICANT CHANGE UP (ref 32–36)
MCV RBC AUTO: 90.5 FL — SIGNIFICANT CHANGE UP (ref 80–100)
MONOCYTES # BLD AUTO: 0.43 K/UL — SIGNIFICANT CHANGE UP (ref 0–0.9)
MONOCYTES NFR BLD AUTO: 18.3 % — HIGH (ref 2–14)
NEUTROPHILS # BLD AUTO: 1.11 K/UL — LOW (ref 1.8–7.4)
NEUTROPHILS NFR BLD AUTO: 47.3 % — SIGNIFICANT CHANGE UP (ref 43–77)
NRBC # BLD: 0 /100 WBCS — SIGNIFICANT CHANGE UP (ref 0–0)
NRBC # FLD: 0 K/UL — SIGNIFICANT CHANGE UP (ref 0–0)
PHOSPHATE SERPL-MCNC: 2.9 MG/DL — SIGNIFICANT CHANGE UP (ref 2.5–4.5)
PLATELET # BLD AUTO: 120 K/UL — LOW (ref 150–400)
POTASSIUM SERPL-MCNC: 4 MMOL/L — SIGNIFICANT CHANGE UP (ref 3.5–5.3)
POTASSIUM SERPL-SCNC: 4 MMOL/L — SIGNIFICANT CHANGE UP (ref 3.5–5.3)
PROT SERPL-MCNC: 5.2 G/DL — LOW (ref 6–8.3)
RBC # BLD: 2.75 M/UL — LOW (ref 3.8–5.2)
RBC # FLD: 24.1 % — HIGH (ref 10.3–14.5)
SODIUM SERPL-SCNC: 139 MMOL/L — SIGNIFICANT CHANGE UP (ref 135–145)
WBC # BLD: 2.35 K/UL — LOW (ref 3.8–10.5)
WBC # FLD AUTO: 2.35 K/UL — LOW (ref 3.8–10.5)

## 2024-11-30 PROCEDURE — 99232 SBSQ HOSP IP/OBS MODERATE 35: CPT | Mod: GC

## 2024-11-30 PROCEDURE — 99233 SBSQ HOSP IP/OBS HIGH 50: CPT

## 2024-11-30 RX ORDER — MIDODRINE HYDROCHLORIDE 5 MG/1
10 TABLET ORAL EVERY 8 HOURS
Refills: 0 | Status: DISCONTINUED | OUTPATIENT
Start: 2024-11-30 | End: 2024-12-01

## 2024-11-30 RX ORDER — HYDROCORTISONE ACETATE 25 MG/ML
50 VIAL (ML) INJECTION
Refills: 0 | Status: DISCONTINUED | OUTPATIENT
Start: 2024-11-30 | End: 2024-12-01

## 2024-11-30 RX ADMIN — PANTOPRAZOLE SODIUM 40 MILLIGRAM(S): 40 TABLET, DELAYED RELEASE ORAL at 05:21

## 2024-11-30 RX ADMIN — PANTOPRAZOLE SODIUM 40 MILLIGRAM(S): 40 TABLET, DELAYED RELEASE ORAL at 17:03

## 2024-11-30 RX ADMIN — ESCITALOPRAM OXALATE 20 MILLIGRAM(S): 10 TABLET, FILM COATED ORAL at 13:50

## 2024-11-30 RX ADMIN — Medication 50 MILLIGRAM(S): at 17:05

## 2024-11-30 RX ADMIN — PIPERACILLIN SODIUM AND TAZOBACTAM SODIUM 25 GRAM(S): 4; .5 INJECTION, POWDER, LYOPHILIZED, FOR SOLUTION INTRAVENOUS at 21:37

## 2024-11-30 RX ADMIN — Medication 20 MILLIGRAM(S): at 05:21

## 2024-11-30 RX ADMIN — GABAPENTIN 300 MILLIGRAM(S): 300 CAPSULE ORAL at 13:50

## 2024-11-30 RX ADMIN — GABAPENTIN 300 MILLIGRAM(S): 300 CAPSULE ORAL at 05:21

## 2024-11-30 RX ADMIN — ACETAMINOPHEN 500MG 650 MILLIGRAM(S): 500 TABLET, COATED ORAL at 14:28

## 2024-11-30 RX ADMIN — Medication 20 MILLIGRAM(S): at 21:37

## 2024-11-30 RX ADMIN — Medication 0.5 MILLIGRAM(S): at 14:28

## 2024-11-30 RX ADMIN — LAMOTRIGINE 100 MILLIGRAM(S): 50 TABLET, EXTENDED RELEASE ORAL at 05:21

## 2024-11-30 RX ADMIN — Medication 20 MILLIGRAM(S): at 17:03

## 2024-11-30 RX ADMIN — Medication 1 APPLICATION(S): at 17:03

## 2024-11-30 RX ADMIN — Medication 1 APPLICATION(S): at 05:23

## 2024-11-30 RX ADMIN — TRAZODONE HYDROCHLORIDE 100 MILLIGRAM(S): 150 TABLET ORAL at 21:37

## 2024-11-30 RX ADMIN — Medication 50 MILLIGRAM(S): at 05:21

## 2024-11-30 RX ADMIN — CHLORHEXIDINE GLUCONATE 1 APPLICATION(S): 1.2 RINSE ORAL at 05:22

## 2024-11-30 RX ADMIN — LAMOTRIGINE 100 MILLIGRAM(S): 50 TABLET, EXTENDED RELEASE ORAL at 17:04

## 2024-11-30 RX ADMIN — PIPERACILLIN SODIUM AND TAZOBACTAM SODIUM 25 GRAM(S): 4; .5 INJECTION, POWDER, LYOPHILIZED, FOR SOLUTION INTRAVENOUS at 05:21

## 2024-11-30 RX ADMIN — Medication 1: at 18:35

## 2024-11-30 RX ADMIN — PIPERACILLIN SODIUM AND TAZOBACTAM SODIUM 25 GRAM(S): 4; .5 INJECTION, POWDER, LYOPHILIZED, FOR SOLUTION INTRAVENOUS at 13:50

## 2024-11-30 RX ADMIN — GABAPENTIN 300 MILLIGRAM(S): 300 CAPSULE ORAL at 21:37

## 2024-11-30 NOTE — OCCUPATIONAL THERAPY INITIAL EVALUATION ADULT - MD ORDER
Occupational therapy to evaluate and treat. Occupational therapy to evaluate and treat.  Increase as tolerated.

## 2024-11-30 NOTE — OCCUPATIONAL THERAPY INITIAL EVALUATION ADULT - PERTINENT HX OF CURRENT PROBLEM, REHAB EVAL
64 year old female with history of MS and right femoral fx s/p hip arthroplasty comes to the ED after experiencing palpitations. Found to be in undifferentiated hemorrhagic/hypovolemic vs septic shock. CT A/P revealing proctitis. S/p 1u pRBCs in ED.

## 2024-11-30 NOTE — OCCUPATIONAL THERAPY INITIAL EVALUATION ADULT - BED MOBILITY/TRANSFERS, PREVIOUS LEVEL OF FUNCTION, OT EVAL
patient is mostly bedbound, can get out of bed to wheelchair at times using a Adela lift/unable to perform

## 2024-11-30 NOTE — OCCUPATIONAL THERAPY INITIAL EVALUATION ADULT - RANGE OF MOTION EXAMINATION, UPPER EXTREMITY
Right UE contracted in elbow, wrist, digit flexion (unable to perform passive ROM). Left UE active assisted ROM of shoulder grossly to 80 degrees flexion, elbow grossly WFL, wrist grossly full flexion to neutral, digits grossly WFL

## 2024-11-30 NOTE — OCCUPATIONAL THERAPY INITIAL EVALUATION ADULT - GENERAL OBSERVATIONS, REHAB EVAL
Patient received semisupine in bed in NAD; agreeable to participate in OT evaluation. +telemetry monitor. Personal aide at bedside.

## 2024-11-30 NOTE — PHYSICAL THERAPY INITIAL EVALUATION ADULT - ADDITIONAL COMMENTS
Pt states she lives in an apartment on the main level and was mostly in bed prior to admission. Pt would transfer to a wheelchair via crys lift. Pt has a hospital bed at home and home health aides 24/7. Pt states she was receiving home PT/OT 2x/week.   Post PT evaluation, pt left with head of bed elevated to 30 degrees, alarm on, call bell and remote within reach, all precautions maintained, NAD. RN aware.

## 2024-11-30 NOTE — PROGRESS NOTE ADULT - PROBLEM SELECTOR PLAN 1
patient hypotensive on admission  could be due to hypovolemic/hemorrhagic in setting of possible GI bleed given anemia on presentation  may also be due to septic shock, source can be proctitis   s/p fluid and pRBC resuscitation in ED    PLAN  - continue with zosyn  - d/c fluids, reduce solu-cortef to 50 mg TID, start midodrine  - follow up infectious workup UCx and blood cx (RVP and UA -)  - TTE (BNP 1696) and repeat EKG for workup of palpitations  - appreciate GI recs for concern of GI bleed  - maintain 2 large bore IVs and PPI BID patient hypotensive on admission  could be due to hypovolemic/hemorrhagic in setting of possible GI bleed given anemia on presentation  may also be due to septic shock, source can be proctitis   s/p fluid and pRBC resuscitation in ED    PLAN  - continue with zosyn  - d/c fluids, reduce solu-cortef to 50 mg BID, midodrine PRN as needed   - follow up infectious workup UCx and blood cx (RVP and UA -)  - TTE   - appreciate GI recs, s/p EGD with concern for oozing gastric AVM  - maintain 2 large bore IVs and PPI BID

## 2024-11-30 NOTE — PROGRESS NOTE ADULT - SUBJECTIVE AND OBJECTIVE BOX
Patient is a 64y old  Female who presents with a chief complaint of anemia, hypotension (30 Nov 2024 07:16)      Interval Events:   S/p EGD w/ oozing gastric AVM s/p APC.   No signs of bleeding today, denies any melena, hematochezia.     ROS:   A 12-point ROS was performed and negative except as noted in HPI.    Hospital Medications:  acetaminophen     Tablet .. 650 milliGRAM(s) Oral every 6 hours PRN  albuterol/ipratropium for Nebulization 3 milliLiter(s) Nebulizer every 6 hours PRN  ALPRAZolam 0.5 milliGRAM(s) Oral two times a day PRN  amphetamine/dextroamphetamine XR 20 milliGRAM(s) Oral with breakfast  atorvastatin 20 milliGRAM(s) Oral at bedtime  baclofen 20 milliGRAM(s) Oral every 12 hours  chlorhexidine 2% Cloths 1 Application(s) Topical <User Schedule>  dextrose 5%. 1000 milliLiter(s) IV Continuous <Continuous>  dextrose 5%. 1000 milliLiter(s) IV Continuous <Continuous>  dextrose 50% Injectable 25 Gram(s) IV Push once  dextrose 50% Injectable 12.5 Gram(s) IV Push once  dextrose 50% Injectable 25 Gram(s) IV Push once  dextrose Oral Gel 15 Gram(s) Oral once PRN  escitalopram 20 milliGRAM(s) Oral daily  gabapentin 300 milliGRAM(s) Oral every 8 hours  glucagon  Injectable 1 milliGRAM(s) IntraMuscular once  hydrocortisone sodium succinate Injectable 50 milliGRAM(s) IV Push two times a day  insulin lispro (ADMELOG) corrective regimen sliding scale   SubCutaneous three times a day before meals  insulin lispro (ADMELOG) corrective regimen sliding scale   SubCutaneous at bedtime  lamoTRIgine 100 milliGRAM(s) Oral two times a day  midodrine 10 milliGRAM(s) Oral every 8 hours PRN  mupirocin 2% Nasal 1 Application(s) Both Nostrils two times a day  pantoprazole  Injectable 40 milliGRAM(s) IV Push every 12 hours  piperacillin/tazobactam IVPB.. 3.375 Gram(s) IV Intermittent every 8 hours  traZODone 100 milliGRAM(s) Oral at bedtime      PHYSICAL EXAM:   Vital Signs:  Vital Signs Last 24 Hrs  T(C): 36.3 (30 Nov 2024 12:55), Max: 36.4 (30 Nov 2024 03:00)  T(F): 97.4 (30 Nov 2024 12:55), Max: 97.6 (30 Nov 2024 05:55)  HR: 69 (30 Nov 2024 17:19) (68 - 84)  BP: 121/71 (30 Nov 2024 17:19) (104/51 - 128/72)  BP(mean): --  RR: 17 (30 Nov 2024 12:55) (17 - 17)  SpO2: 100% (30 Nov 2024 12:55) (97% - 100%)    Parameters below as of 30 Nov 2024 12:55  Patient On (Oxygen Delivery Method): room air      Daily     Daily     GENERAL: no acute distress  NEURO: alert  HEENT: anicteric sclera, no conjunctival pallor appreciated  CHEST: no respiratory distress, no accessory muscle use  CARDIAC: regular rate  ABDOMEN: soft, nondistended, nontender, no rebound or guarding  EXTREMITIES: warm, well perfused, no edema  SKIN: no lesions noted    LABS: reviewed                        8.2    2.35  )-----------( 120      ( 30 Nov 2024 05:55 )             24.9     11-30    139  |  109[H]  |  29[H]  ----------------------------<  91  4.0   |  18[L]  |  0.21[L]    Ca    8.1[L]      30 Nov 2024 05:55  Phos  2.9     11-30  Mg     2.20     11-30    TPro  5.2[L]  /  Alb  3.1[L]  /  TBili  0.4  /  DBili  x   /  AST  18  /  ALT  21  /  AlkPhos  54  11-30    LIVER FUNCTIONS - ( 30 Nov 2024 05:55 )  Alb: 3.1 g/dL / Pro: 5.2 g/dL / ALK PHOS: 54 U/L / ALT: 21 U/L / AST: 18 U/L / GGT: x           < from: Upper Endoscopy (11.29.24 @ 14:47) >  Procedure:           Upper GI endoscopy  Indications:         Melena  Providers:           KARLI GARVIN MD, Sary Shell MD                        (Fellow)  Medicines:           Monitored Anesthesia Care  Complications:       No immediate complications.  Procedure:           Pre-Anesthesia Assessment:                       - Prior to the procedure, a History and Physical was                        performed, and patient medications, allergies and                        sensitivities were reviewed. The patient's tolerance of                        previous anesthesia was reviewed.                       - The risks and benefits of the procedure and the                        sedation options and risks were discussed with the                   patient. All questions were answered and informed                        consent was obtained.                       After obtaining informed consent, the endoscope was                        passed under direct vision. Throughout theprocedure,                        the patient's blood pressure, pulse, and oxygen                        saturations were monitored continuously. The Endoscope                        was introduced through the mouth, and advanced to the            second part of duodenum. The upper GI endoscopy was                        accomplished without difficulty. The patient tolerated                        the procedure well.               Findings:       Possible 3 mm angioectasia found in the gastric body, with oozing.        Coagulation for hemostasis using argon beam was successful in treating        the oozing lesion.       The duodenal bulb, first portion of the duodenum and second portion of        the duodenum were normal.       The gastroesophageal flap valve was visualized endoscopically and        classified as Hill Grade III (minimal fold, loose to endoscope, hiatal        hernia likely).                                                                 Impression:          - Normal esophagus.                       - Possible angioectasia in the stomach with active                        oozing. Oozing AVM successfully treated with argonbeam                        coagulation. possible source of patient's melena                       - Normal duodenal bulb, first portion of the duodenum                        and second portion of the duodenum.                       - No specimens collected.  Recommendation:      - Return patient to hospital [facility] for ongoing care.                       - Advance diet as tolerated.                       - Monitor for further bleeding and response to                        transfusions. If furtherbleeding or not responsive to                        transfusions, please reach out to GI team for further                        and possible further endoscopic evaluation    < end of copied text >        Interval Diagnostic Studies: see sunrise for full report

## 2024-11-30 NOTE — PROGRESS NOTE ADULT - PROBLEM SELECTOR PLAN 4
hx of MS and no longer taking Vumerity due to leukopenia     - continue with gabapentin, lamictal, and baclofen

## 2024-11-30 NOTE — OCCUPATIONAL THERAPY INITIAL EVALUATION ADULT - ADDITIONAL COMMENTS
Patient has 24/7 home health aides to assist with all ADLs. Patient owns a total assist mechanical lift, hospital bed, wheelchair.

## 2024-11-30 NOTE — PROGRESS NOTE ADULT - PROBLEM SELECTOR PLAN 6
Diet: NPO  Dispo: pending additional workup  Code: Full  DVT ppx: SCDs Diet: AAT  Dispo: pending additional workup  Code: Full  DVT ppx: SCDs

## 2024-11-30 NOTE — PHYSICAL THERAPY INITIAL EVALUATION ADULT - NSPTDISCHREC_GEN_A_CORE
Home with reinstatement of services. Pt is at their functional baseline and will not be placed on PT program. Please reconsult if indicated.

## 2024-11-30 NOTE — PROGRESS NOTE ADULT - SUBJECTIVE AND OBJECTIVE BOX
INTERVAL HPI/OVERNIGHT EVENTS:    SUBJECTIVE: Patient seen and examined at bedside.         OBJECTIVE:    VITAL SIGNS:  ICU Vital Signs Last 24 Hrs  T(C): 36.4 (30 Nov 2024 05:55), Max: 36.7 (29 Nov 2024 11:45)  T(F): 97.6 (30 Nov 2024 05:55), Max: 98.1 (29 Nov 2024 11:45)  HR: 79 (30 Nov 2024 05:55) (74 - 91)  BP: 123/69 (30 Nov 2024 05:55) (94/54 - 128/72)  BP(mean): --  ABP: --  ABP(mean): --  RR: 17 (30 Nov 2024 05:55) (12 - 19)  SpO2: 100% (30 Nov 2024 05:55) (65% - 100%)    O2 Parameters below as of 30 Nov 2024 05:55  Patient On (Oxygen Delivery Method): room air            Plateau pressure:   P/F ratio:     CAPILLARY BLOOD GLUCOSE      POCT Blood Glucose.: 119 mg/dL (29 Nov 2024 22:26)      PHYSICAL EXAM:    GENERAL: NAD  Psych: AAOx3. Bizarre affect, soft spoken, tangential. Not acutely manic nor psychotic. No AVH.  Cardiovascular: Tachycardic, S1 S2, no m/r/g, no JVD  LUNGS: Unlabored respiration, CTABL  ABDOMEN: Soft, NTND  EXTREMITIES: Warm extremities, no edema, peripheral pulses 2+ bilaterally      MEDICATIONS:  MEDICATIONS  (STANDING):  amphetamine/dextroamphetamine XR 20 milliGRAM(s) Oral with breakfast  atorvastatin 20 milliGRAM(s) Oral at bedtime  baclofen 20 milliGRAM(s) Oral every 12 hours  chlorhexidine 2% Cloths 1 Application(s) Topical <User Schedule>  dextrose 5%. 1000 milliLiter(s) (100 mL/Hr) IV Continuous <Continuous>  dextrose 5%. 1000 milliLiter(s) (50 mL/Hr) IV Continuous <Continuous>  dextrose 50% Injectable 25 Gram(s) IV Push once  dextrose 50% Injectable 12.5 Gram(s) IV Push once  dextrose 50% Injectable 25 Gram(s) IV Push once  escitalopram 20 milliGRAM(s) Oral daily  gabapentin 300 milliGRAM(s) Oral every 8 hours  glucagon  Injectable 1 milliGRAM(s) IntraMuscular once  hydrocortisone sodium succinate Injectable 50 milliGRAM(s) IV Push every 8 hours  insulin lispro (ADMELOG) corrective regimen sliding scale   SubCutaneous three times a day before meals  insulin lispro (ADMELOG) corrective regimen sliding scale   SubCutaneous at bedtime  lamoTRIgine 100 milliGRAM(s) Oral two times a day  midodrine. 10 milliGRAM(s) Oral three times a day  mupirocin 2% Nasal 1 Application(s) Both Nostrils two times a day  pantoprazole  Injectable 40 milliGRAM(s) IV Push every 12 hours  piperacillin/tazobactam IVPB.. 3.375 Gram(s) IV Intermittent every 8 hours  traZODone 100 milliGRAM(s) Oral at bedtime    MEDICATIONS  (PRN):  acetaminophen     Tablet .. 650 milliGRAM(s) Oral every 6 hours PRN Temp greater or equal to 38C (100.4F), Mild Pain (1 - 3)  albuterol/ipratropium for Nebulization 3 milliLiter(s) Nebulizer every 6 hours PRN Shortness of Breath and/or Wheezing  ALPRAZolam 0.5 milliGRAM(s) Oral two times a day PRN anxiety  dextrose Oral Gel 15 Gram(s) Oral once PRN Blood Glucose LESS THAN 70 milliGRAM(s)/deciliter      LABS:                        8.2    2.35  )-----------( 120      ( 30 Nov 2024 05:55 )             24.9     11-29    140  |  106  |  43[H]  ----------------------------<  135[H]  4.2   |  23  |  0.28[L]    Ca    8.3[L]      29 Nov 2024 06:36  Phos  4.0     11-29  Mg     2.30     11-29    TPro  5.5[L]  /  Alb  3.4  /  TBili  0.4  /  DBili  x   /  AST  23  /  ALT  21  /  AlkPhos  66  11-28    PT/INR - ( 28 Nov 2024 11:37 )   PT: 14.1 sec;   INR: 1.19 ratio         PTT - ( 28 Nov 2024 11:37 )  PTT:26.8 sec  Urinalysis Basic - ( 29 Nov 2024 06:36 )    Color: x / Appearance: x / SG: x / pH: x  Gluc: 135 mg/dL / Ketone: x  / Bili: x / Urobili: x   Blood: x / Protein: x / Nitrite: x   Leuk Esterase: x / RBC: x / WBC x   Sq Epi: x / Non Sq Epi: x / Bacteria: x        RADIOLOGY & ADDITIONAL TESTS: Reviewed.     INTERVAL HPI/OVERNIGHT EVENTS: NAEON.     SUBJECTIVE: Patient seen and examined at bedside. Denies hematemesis, hemoptysis. Says unsure about color of stool (she doesn't see it) but will keep track next BM to see if there is any melena or hematochezia. Asked for extra blanket     OBJECTIVE:    VITAL SIGNS:  ICU Vital Signs Last 24 Hrs  T(C): 36.4 (30 Nov 2024 05:55), Max: 36.7 (29 Nov 2024 11:45)  T(F): 97.6 (30 Nov 2024 05:55), Max: 98.1 (29 Nov 2024 11:45)  HR: 79 (30 Nov 2024 05:55) (74 - 91)  BP: 123/69 (30 Nov 2024 05:55) (94/54 - 128/72)  BP(mean): --  ABP: --  ABP(mean): --  RR: 17 (30 Nov 2024 05:55) (12 - 19)  SpO2: 100% (30 Nov 2024 05:55) (65% - 100%)    O2 Parameters below as of 30 Nov 2024 05:55  Patient On (Oxygen Delivery Method): room air            Plateau pressure:   P/F ratio:     CAPILLARY BLOOD GLUCOSE      POCT Blood Glucose.: 119 mg/dL (29 Nov 2024 22:26)      PHYSICAL EXAM:    GENERAL: NAD  Psych: AAOx3. Soft spoken.   Cardiovascular: Tachycardic, S1 S2, no m/r/g, no JVD  LUNGS: Unlabored respiration, CTABL  ABDOMEN: Soft, NTND  EXTREMITIES: Warm extremities, no edema, peripheral pulses 2+ bilaterally, RUE contracture       MEDICATIONS:  MEDICATIONS  (STANDING):  amphetamine/dextroamphetamine XR 20 milliGRAM(s) Oral with breakfast  atorvastatin 20 milliGRAM(s) Oral at bedtime  baclofen 20 milliGRAM(s) Oral every 12 hours  chlorhexidine 2% Cloths 1 Application(s) Topical <User Schedule>  dextrose 5%. 1000 milliLiter(s) (100 mL/Hr) IV Continuous <Continuous>  dextrose 5%. 1000 milliLiter(s) (50 mL/Hr) IV Continuous <Continuous>  dextrose 50% Injectable 25 Gram(s) IV Push once  dextrose 50% Injectable 12.5 Gram(s) IV Push once  dextrose 50% Injectable 25 Gram(s) IV Push once  escitalopram 20 milliGRAM(s) Oral daily  gabapentin 300 milliGRAM(s) Oral every 8 hours  glucagon  Injectable 1 milliGRAM(s) IntraMuscular once  hydrocortisone sodium succinate Injectable 50 milliGRAM(s) IV Push every 8 hours  insulin lispro (ADMELOG) corrective regimen sliding scale   SubCutaneous three times a day before meals  insulin lispro (ADMELOG) corrective regimen sliding scale   SubCutaneous at bedtime  lamoTRIgine 100 milliGRAM(s) Oral two times a day  midodrine. 10 milliGRAM(s) Oral three times a day  mupirocin 2% Nasal 1 Application(s) Both Nostrils two times a day  pantoprazole  Injectable 40 milliGRAM(s) IV Push every 12 hours  piperacillin/tazobactam IVPB.. 3.375 Gram(s) IV Intermittent every 8 hours  traZODone 100 milliGRAM(s) Oral at bedtime    MEDICATIONS  (PRN):  acetaminophen     Tablet .. 650 milliGRAM(s) Oral every 6 hours PRN Temp greater or equal to 38C (100.4F), Mild Pain (1 - 3)  albuterol/ipratropium for Nebulization 3 milliLiter(s) Nebulizer every 6 hours PRN Shortness of Breath and/or Wheezing  ALPRAZolam 0.5 milliGRAM(s) Oral two times a day PRN anxiety  dextrose Oral Gel 15 Gram(s) Oral once PRN Blood Glucose LESS THAN 70 milliGRAM(s)/deciliter      LABS:                        8.2    2.35  )-----------( 120      ( 30 Nov 2024 05:55 )             24.9     11-29    140  |  106  |  43[H]  ----------------------------<  135[H]  4.2   |  23  |  0.28[L]    Ca    8.3[L]      29 Nov 2024 06:36  Phos  4.0     11-29  Mg     2.30     11-29    TPro  5.5[L]  /  Alb  3.4  /  TBili  0.4  /  DBili  x   /  AST  23  /  ALT  21  /  AlkPhos  66  11-28    PT/INR - ( 28 Nov 2024 11:37 )   PT: 14.1 sec;   INR: 1.19 ratio         PTT - ( 28 Nov 2024 11:37 )  PTT:26.8 sec  Urinalysis Basic - ( 29 Nov 2024 06:36 )    Color: x / Appearance: x / SG: x / pH: x  Gluc: 135 mg/dL / Ketone: x  / Bili: x / Urobili: x   Blood: x / Protein: x / Nitrite: x   Leuk Esterase: x / RBC: x / WBC x   Sq Epi: x / Non Sq Epi: x / Bacteria: x        RADIOLOGY & ADDITIONAL TESTS: Reviewed.

## 2024-11-30 NOTE — PHYSICAL THERAPY INITIAL EVALUATION ADULT - PERTINENT HX OF CURRENT PROBLEM, REHAB EVAL
Patient did not return after initial evaluation.  Please see initial evaluation from 8/16/21 for discharge status.    65 yo F with pmhx of MS and R femoral fx s/p hip arthroplasty comes to the ED after experiencing palpitations found to be in undifferentiated hemorrhagic/hypovolemic vs septic shock. No overt GIB, but FOBT+; has proctitis on CT AP. Weaned off levo s/p 1u pRBCs in ED

## 2024-11-30 NOTE — PROGRESS NOTE ADULT - PROBLEM SELECTOR PLAN 2
Hb 5.5 on admission s/p one unit pRBC transfusion with repeat Hb>7  may be due to GI bleed    - obtain iron studies and hemolytic workup  - B12 WNL, Folate supratherapeutic  - maintain 2 large bore IVs and PPI BID  - maintain active T/S  - addition 1 unit pRBC AM on 11/29 Hb 5.5 on admission s/p one unit pRBC transfusion with repeat Hb>7  may be due to GI bleed    - obtain iron studies and hemolytic workup  - B12 WNL, Folate supratherapeutic  - maintain 2 large bore IVs and PPI BID  - maintain active T/S  - addition 1 unit pRBC AM on 11/29  - GI EGD above

## 2024-11-30 NOTE — OCCUPATIONAL THERAPY INITIAL EVALUATION ADULT - DIAGNOSIS, OT EVAL
Message  Return to work or school:   Jp Reza is under my professional care   She was seen in my office on 11/30/2017             Signatures   Electronically signed by : Karly Nicholas, ; Nov 30 2017  1:27PM EST                       (Author)
s/p palpitations, s/p proctitis; generalized weakness, h/o MS

## 2024-12-01 LAB
24R-OH-CALCIDIOL SERPL-MCNC: 59 NG/ML — SIGNIFICANT CHANGE UP (ref 30–80)
ALBUMIN SERPL ELPH-MCNC: 3.2 G/DL — LOW (ref 3.3–5)
ALP SERPL-CCNC: 57 U/L — SIGNIFICANT CHANGE UP (ref 40–120)
ALT FLD-CCNC: 23 U/L — SIGNIFICANT CHANGE UP (ref 4–33)
ANION GAP SERPL CALC-SCNC: 14 MMOL/L — SIGNIFICANT CHANGE UP (ref 7–14)
AST SERPL-CCNC: 26 U/L — SIGNIFICANT CHANGE UP (ref 4–32)
BASOPHILS # BLD AUTO: 0.01 K/UL — SIGNIFICANT CHANGE UP (ref 0–0.2)
BASOPHILS NFR BLD AUTO: 0.4 % — SIGNIFICANT CHANGE UP (ref 0–2)
BILIRUB SERPL-MCNC: 0.5 MG/DL — SIGNIFICANT CHANGE UP (ref 0.2–1.2)
BUN SERPL-MCNC: 16 MG/DL — SIGNIFICANT CHANGE UP (ref 7–23)
CALCIUM SERPL-MCNC: 8.4 MG/DL — SIGNIFICANT CHANGE UP (ref 8.4–10.5)
CHLORIDE SERPL-SCNC: 109 MMOL/L — HIGH (ref 98–107)
CO2 SERPL-SCNC: 19 MMOL/L — LOW (ref 22–31)
CREAT SERPL-MCNC: 0.23 MG/DL — LOW (ref 0.5–1.3)
EGFR: 127 ML/MIN/1.73M2 — SIGNIFICANT CHANGE UP
EOSINOPHIL # BLD AUTO: 0.01 K/UL — SIGNIFICANT CHANGE UP (ref 0–0.5)
EOSINOPHIL NFR BLD AUTO: 0.4 % — SIGNIFICANT CHANGE UP (ref 0–6)
GLUCOSE BLDC GLUCOMTR-MCNC: 112 MG/DL — HIGH (ref 70–99)
GLUCOSE BLDC GLUCOMTR-MCNC: 116 MG/DL — HIGH (ref 70–99)
GLUCOSE BLDC GLUCOMTR-MCNC: 144 MG/DL — HIGH (ref 70–99)
GLUCOSE BLDC GLUCOMTR-MCNC: 146 MG/DL — HIGH (ref 70–99)
GLUCOSE SERPL-MCNC: 83 MG/DL — SIGNIFICANT CHANGE UP (ref 70–99)
HCT VFR BLD CALC: 27.4 % — LOW (ref 34.5–45)
HGB BLD-MCNC: 8.8 G/DL — LOW (ref 11.5–15.5)
IANC: 0.48 K/UL — LOW (ref 1.8–7.4)
IMM GRANULOCYTES NFR BLD AUTO: 0 % — SIGNIFICANT CHANGE UP (ref 0–0.9)
LYMPHOCYTES # BLD AUTO: 1.29 K/UL — SIGNIFICANT CHANGE UP (ref 1–3.3)
LYMPHOCYTES # BLD AUTO: 57.1 % — HIGH (ref 13–44)
MAGNESIUM SERPL-MCNC: 2.2 MG/DL — SIGNIFICANT CHANGE UP (ref 1.6–2.6)
MCHC RBC-ENTMCNC: 30.1 PG — SIGNIFICANT CHANGE UP (ref 27–34)
MCHC RBC-ENTMCNC: 32.1 G/DL — SIGNIFICANT CHANGE UP (ref 32–36)
MCV RBC AUTO: 93.8 FL — SIGNIFICANT CHANGE UP (ref 80–100)
MONOCYTES # BLD AUTO: 0.47 K/UL — SIGNIFICANT CHANGE UP (ref 0–0.9)
MONOCYTES NFR BLD AUTO: 20.8 % — HIGH (ref 2–14)
NEUTROPHILS # BLD AUTO: 0.48 K/UL — LOW (ref 1.8–7.4)
NEUTROPHILS NFR BLD AUTO: 21.3 % — LOW (ref 43–77)
NRBC # BLD: 0 /100 WBCS — SIGNIFICANT CHANGE UP (ref 0–0)
NRBC # FLD: 0 K/UL — SIGNIFICANT CHANGE UP (ref 0–0)
PHOSPHATE SERPL-MCNC: 2.5 MG/DL — SIGNIFICANT CHANGE UP (ref 2.5–4.5)
PLATELET # BLD AUTO: 149 K/UL — LOW (ref 150–400)
POTASSIUM SERPL-MCNC: 4.1 MMOL/L — SIGNIFICANT CHANGE UP (ref 3.5–5.3)
POTASSIUM SERPL-SCNC: 4.1 MMOL/L — SIGNIFICANT CHANGE UP (ref 3.5–5.3)
PROT SERPL-MCNC: 5.4 G/DL — LOW (ref 6–8.3)
PTH-INTACT FLD-MCNC: 28 PG/ML — SIGNIFICANT CHANGE UP (ref 15–65)
RBC # BLD: 2.92 M/UL — LOW (ref 3.8–5.2)
RBC # FLD: 25.2 % — HIGH (ref 10.3–14.5)
SODIUM SERPL-SCNC: 142 MMOL/L — SIGNIFICANT CHANGE UP (ref 135–145)
WBC # BLD: 2.26 K/UL — LOW (ref 3.8–10.5)
WBC # FLD AUTO: 2.26 K/UL — LOW (ref 3.8–10.5)

## 2024-12-01 PROCEDURE — 99233 SBSQ HOSP IP/OBS HIGH 50: CPT

## 2024-12-01 PROCEDURE — 93306 TTE W/DOPPLER COMPLETE: CPT | Mod: 26

## 2024-12-01 RX ORDER — METHYLPREDNISOLONE SOD SUCC 125 MG
4 VIAL (EA) INJECTION
Refills: 0 | Status: DISCONTINUED | OUTPATIENT
Start: 2024-12-02 | End: 2024-12-02

## 2024-12-01 RX ORDER — METHYLPREDNISOLONE SOD SUCC 125 MG
VIAL (EA) INJECTION
Refills: 0 | Status: DISCONTINUED | OUTPATIENT
Start: 2024-12-01 | End: 2024-12-02

## 2024-12-01 RX ORDER — METHYLPREDNISOLONE SOD SUCC 125 MG
8 VIAL (EA) INJECTION AT BEDTIME
Refills: 0 | Status: DISCONTINUED | OUTPATIENT
Start: 2024-12-02 | End: 2024-12-02

## 2024-12-01 RX ORDER — METHYLPREDNISOLONE SOD SUCC 125 MG
24 VIAL (EA) INJECTION ONCE
Refills: 0 | Status: COMPLETED | OUTPATIENT
Start: 2024-12-01 | End: 2024-12-01

## 2024-12-01 RX ORDER — SENNOSIDES 8.6 MG
1 TABLET ORAL AT BEDTIME
Refills: 0 | Status: DISCONTINUED | OUTPATIENT
Start: 2024-12-01 | End: 2024-12-04

## 2024-12-01 RX ORDER — POLYETHYLENE GLYCOL 3350 17 G/17G
17 POWDER, FOR SOLUTION ORAL DAILY
Refills: 0 | Status: DISCONTINUED | OUTPATIENT
Start: 2024-12-01 | End: 2024-12-04

## 2024-12-01 RX ORDER — PANTOPRAZOLE SODIUM 40 MG/1
40 TABLET, DELAYED RELEASE ORAL
Refills: 0 | Status: DISCONTINUED | OUTPATIENT
Start: 2024-12-01 | End: 2024-12-02

## 2024-12-01 RX ADMIN — PIPERACILLIN SODIUM AND TAZOBACTAM SODIUM 25 GRAM(S): 4; .5 INJECTION, POWDER, LYOPHILIZED, FOR SOLUTION INTRAVENOUS at 22:23

## 2024-12-01 RX ADMIN — TRAZODONE HYDROCHLORIDE 100 MILLIGRAM(S): 150 TABLET ORAL at 22:22

## 2024-12-01 RX ADMIN — ESCITALOPRAM OXALATE 20 MILLIGRAM(S): 10 TABLET, FILM COATED ORAL at 14:11

## 2024-12-01 RX ADMIN — GABAPENTIN 300 MILLIGRAM(S): 300 CAPSULE ORAL at 14:10

## 2024-12-01 RX ADMIN — Medication 5 MILLIGRAM(S): at 22:22

## 2024-12-01 RX ADMIN — Medication 20 MILLIGRAM(S): at 22:22

## 2024-12-01 RX ADMIN — Medication 1 APPLICATION(S): at 05:15

## 2024-12-01 RX ADMIN — Medication 20 MILLIGRAM(S): at 17:03

## 2024-12-01 RX ADMIN — CHLORHEXIDINE GLUCONATE 1 APPLICATION(S): 1.2 RINSE ORAL at 05:15

## 2024-12-01 RX ADMIN — Medication 1 TABLET(S): at 22:22

## 2024-12-01 RX ADMIN — PIPERACILLIN SODIUM AND TAZOBACTAM SODIUM 25 GRAM(S): 4; .5 INJECTION, POWDER, LYOPHILIZED, FOR SOLUTION INTRAVENOUS at 14:10

## 2024-12-01 RX ADMIN — LAMOTRIGINE 100 MILLIGRAM(S): 50 TABLET, EXTENDED RELEASE ORAL at 17:03

## 2024-12-01 RX ADMIN — Medication 0.5 MILLIGRAM(S): at 14:26

## 2024-12-01 RX ADMIN — Medication 50 MILLIGRAM(S): at 05:15

## 2024-12-01 RX ADMIN — GABAPENTIN 300 MILLIGRAM(S): 300 CAPSULE ORAL at 05:13

## 2024-12-01 RX ADMIN — POLYETHYLENE GLYCOL 3350 17 GRAM(S): 17 POWDER, FOR SOLUTION ORAL at 14:27

## 2024-12-01 RX ADMIN — PIPERACILLIN SODIUM AND TAZOBACTAM SODIUM 25 GRAM(S): 4; .5 INJECTION, POWDER, LYOPHILIZED, FOR SOLUTION INTRAVENOUS at 05:14

## 2024-12-01 RX ADMIN — GABAPENTIN 300 MILLIGRAM(S): 300 CAPSULE ORAL at 22:22

## 2024-12-01 RX ADMIN — PANTOPRAZOLE SODIUM 40 MILLIGRAM(S): 40 TABLET, DELAYED RELEASE ORAL at 05:16

## 2024-12-01 RX ADMIN — Medication 20 MILLIGRAM(S): at 05:14

## 2024-12-01 RX ADMIN — Medication 24 MILLIGRAM(S): at 17:04

## 2024-12-01 RX ADMIN — LAMOTRIGINE 100 MILLIGRAM(S): 50 TABLET, EXTENDED RELEASE ORAL at 05:13

## 2024-12-01 RX ADMIN — Medication 1 APPLICATION(S): at 17:07

## 2024-12-01 RX ADMIN — PANTOPRAZOLE SODIUM 40 MILLIGRAM(S): 40 TABLET, DELAYED RELEASE ORAL at 17:12

## 2024-12-01 NOTE — PROGRESS NOTE ADULT - SUBJECTIVE AND OBJECTIVE BOX
Patient is a 64y old  Female who presents with a chief complaint of anemia, hypotension (2024 21:26)      ======Overnight/Subjective======  Overnight    Subjective    Brief daily plan    ======Medications======  MEDICATIONS  (STANDING):  amphetamine/dextroamphetamine XR 20 milliGRAM(s) Oral with breakfast  atorvastatin 20 milliGRAM(s) Oral at bedtime  baclofen 20 milliGRAM(s) Oral every 12 hours  chlorhexidine 2% Cloths 1 Application(s) Topical <User Schedule>  dextrose 5%. 1000 milliLiter(s) (50 mL/Hr) IV Continuous <Continuous>  dextrose 5%. 1000 milliLiter(s) (100 mL/Hr) IV Continuous <Continuous>  dextrose 50% Injectable 25 Gram(s) IV Push once  dextrose 50% Injectable 12.5 Gram(s) IV Push once  dextrose 50% Injectable 25 Gram(s) IV Push once  escitalopram 20 milliGRAM(s) Oral daily  gabapentin 300 milliGRAM(s) Oral every 8 hours  glucagon  Injectable 1 milliGRAM(s) IntraMuscular once  hydrocortisone sodium succinate Injectable 50 milliGRAM(s) IV Push two times a day  insulin lispro (ADMELOG) corrective regimen sliding scale   SubCutaneous three times a day before meals  insulin lispro (ADMELOG) corrective regimen sliding scale   SubCutaneous at bedtime  lamoTRIgine 100 milliGRAM(s) Oral two times a day  mupirocin 2% Nasal 1 Application(s) Both Nostrils two times a day  pantoprazole  Injectable 40 milliGRAM(s) IV Push every 12 hours  piperacillin/tazobactam IVPB.. 3.375 Gram(s) IV Intermittent every 8 hours  traZODone 100 milliGRAM(s) Oral at bedtime    MEDICATIONS  (PRN):  acetaminophen     Tablet .. 650 milliGRAM(s) Oral every 6 hours PRN Temp greater or equal to 38C (100.4F), Mild Pain (1 - 3)  albuterol/ipratropium for Nebulization 3 milliLiter(s) Nebulizer every 6 hours PRN Shortness of Breath and/or Wheezing  ALPRAZolam 0.5 milliGRAM(s) Oral two times a day PRN anxiety  dextrose Oral Gel 15 Gram(s) Oral once PRN Blood Glucose LESS THAN 70 milliGRAM(s)/deciliter  midodrine 10 milliGRAM(s) Oral every 8 hours PRN Systolic BP <110      ======Vital Signs======  T(C): 36.2 (24 @ 01:36), Max: 36.4 (24 @ 21:36)  T(F): 97.1 (24 @ :36), Max: 97.5 (24 @ 21:36)  HR: 70 (24:36) (68 - 70)  BP: 134/75 (24 @ :36) (104/51 - 134/75)  BP(mean): --  RR: 17 (24 @ :36) (17 - 17)  SpO2: 100% (24 @ :36) (100% - 100%)    ======Physical exams======  GENERAL: NAD  Cardiovascular: RRR, S1 S2, no m/r/g, no JVD  LUNGS: Unlabored respiration, CTABL  ABDOMEN: Soft, NTND  EXTREMITIES: Warm extremities, no edema, peripheral pulses 2+ bilaterally  NEURO: AAOx3, PERRLA    ======Labs======                8.2[L]  2.35[L] >----------< 120[L]  (MCV: 90.5)                24.9[L]   139 | 109[H] | 29[H]  -----------------------< 91  4.0 | 18[L] | 0.21[L]    TPro: 5.2[L] / Alb: 3.1[L] / TBili: 0.4 / DBili: -- / AlkPhos: 54 / ALT: 21 / AST: 18 (24 @ 05:55)  Ca: 8.1[L] / Phos: 2.9 / M.20 (24 @ 05:55)    Gas: 7.31[L] / 42 / 36 / 21[L] / 64.8[L]% / -4.8[L] (24 @ 17:49)      ======Microbiology======  Urinalysis Basic - ( 2024 05:55 )    Color: x / Appearance: x / SG: x / pH: x  Gluc: 91 mg/dL / Ketone: x  / Bili: x / Urobili: x   Blood: x / Protein: x / Nitrite: x   Leuk Esterase: x / RBC: x / WBC x   Sq Epi: x / Non Sq Epi: x / Bacteria: x        Culture - Urine (collected 2024 11:06)  Source: Catheterized  Final Report (2024 21:52):    10,000 - 49,000 CFU/mL Escherichia coli    <10,000 CFU/ml Normal Urogenital amy present  Organism: Escherichia coli (2024 21:52)  Organism: Escherichia coli (2024 21:52)    Urinalysis with Rflx Culture (collected 2024 11:06)    Culture - Blood (collected 2024 09:14)  Source: .Blood BLOOD  Preliminary Report (2024 15:01):    No growth at 48 Hours    Culture - Blood (collected 2024 08:45)  Source: .Blood BLOOD  Preliminary Report (2024 15:01):    No growth at 48 Hours        ======I&O's======  I&O's Summary    2024 07:01  -  01 Dec 2024 07:00  --------------------------------------------------------  IN: 0 mL / OUT: 950 mL / NET: -950 mL         Patient is a 64y old  Female who presents with a chief complaint of anemia, hypotension (01 Dec 2024 07:18)      ======Overnight/Subjective======  No events overnight. Patient with constipation, but otherwise with no concerns at this time. Is not having nausea, vomiting, fever, chills, abdominal pain, or palpitations. Tolerating liquid diet.    Brief daily plan  - Advance to regular diet.  - Discontinue midodrine and solu-cortef. Start PO methylprednisolone taper.  - Transition from IV to PO pantoprazole 40 mg BID.  - f/u TTE.    ======Medications======  MEDICATIONS  (STANDING):  amphetamine/dextroamphetamine XR 20 milliGRAM(s) Oral with breakfast  atorvastatin 20 milliGRAM(s) Oral at bedtime  baclofen 20 milliGRAM(s) Oral every 12 hours  bisacodyl 5 milliGRAM(s) Oral at bedtime  chlorhexidine 2% Cloths 1 Application(s) Topical <User Schedule>  dextrose 5%. 1000 milliLiter(s) (50 mL/Hr) IV Continuous <Continuous>  dextrose 5%. 1000 milliLiter(s) (100 mL/Hr) IV Continuous <Continuous>  dextrose 50% Injectable 12.5 Gram(s) IV Push once  dextrose 50% Injectable 25 Gram(s) IV Push once  dextrose 50% Injectable 25 Gram(s) IV Push once  escitalopram 20 milliGRAM(s) Oral daily  gabapentin 300 milliGRAM(s) Oral every 8 hours  glucagon  Injectable 1 milliGRAM(s) IntraMuscular once  insulin lispro (ADMELOG) corrective regimen sliding scale   SubCutaneous three times a day before meals  insulin lispro (ADMELOG) corrective regimen sliding scale   SubCutaneous at bedtime  lamoTRIgine 100 milliGRAM(s) Oral two times a day  methylPREDNISolone   Oral   methylPREDNISolone 24 milliGRAM(s) Oral once  mupirocin 2% Nasal 1 Application(s) Both Nostrils two times a day  pantoprazole    Tablet 40 milliGRAM(s) Oral two times a day  piperacillin/tazobactam IVPB.. 3.375 Gram(s) IV Intermittent every 8 hours  polyethylene glycol 3350 17 Gram(s) Oral daily  senna 1 Tablet(s) Oral at bedtime  traZODone 100 milliGRAM(s) Oral at bedtime    MEDICATIONS  (PRN):  acetaminophen     Tablet .. 650 milliGRAM(s) Oral every 6 hours PRN Temp greater or equal to 38C (100.4F), Mild Pain (1 - 3)  albuterol/ipratropium for Nebulization 3 milliLiter(s) Nebulizer every 6 hours PRN Shortness of Breath and/or Wheezing  ALPRAZolam 0.5 milliGRAM(s) Oral two times a day PRN anxiety  dextrose Oral Gel 15 Gram(s) Oral once PRN Blood Glucose LESS THAN 70 milliGRAM(s)/deciliter      ======Vital Signs======  T(C): 36.3 (24 @ 05:00), Max: 36.4 (24 @ 21:36)  T(F): 97.4 (24 @ 05:00), Max: 97.5 (24 @ 21:36)  HR: 71 (24 @ 05:00) (68 - 71)  BP: 150/72 (24 @ 05:00) (104/51 - 150/72)  BP(mean): --  RR: 17 (24 @ 05:00) (17 - 17)  SpO2: 100% (24 @ 05:00) (100% - 100%)    ======Physical exams======  GENERAL: NAD  Psych: AAOx3. Soft spoken. Tangential.  Cardiovascular: Tachycardic, S1 S2, no m/r/g, no JVD  LUNGS: Unlabored respiration, CTABL  ABDOMEN: Soft, NTND  EXTREMITIES: Warm extremities, no edema, peripheral pulses 2+ bilaterally, upper and lower extremity contractures    ======Labs======                8.8[L]  2.26[L] >----------< 149[L]  (MCV: 93.8)                27.4[L]   142 | 109[H] | 16  -----------------------< 83  4.1 | 19[L] | 0.23[L]    TPro: 5.4[L] / Alb: 3.2[L] / TBili: 0.5 / DBili: -- / AlkPhos: 57 / ALT: 23 / AST: 26 (24 @ 05:46)  Ca: 8.4 / Phos: 2.5 / M.20 (24 @ 05:46)    Gas: 7.31[L] / 42 / 36 / 21[L] / 64.8[L]% / -4.8[L] (24 @ 17:49)      ======Microbiology======  Urinalysis Basic - ( 01 Dec 2024 05:46 )    Color: x / Appearance: x / SG: x / pH: x  Gluc: 83 mg/dL / Ketone: x  / Bili: x / Urobili: x   Blood: x / Protein: x / Nitrite: x   Leuk Esterase: x / RBC: x / WBC x   Sq Epi: x / Non Sq Epi: x / Bacteria: x        Culture - Urine (collected 2024 11:06)  Source: Catheterized  Final Report (2024 21:52):    10,000 - 49,000 CFU/mL Escherichia coli    <10,000 CFU/ml Normal Urogenital amy present  Organism: Escherichia coli (2024 21:52)  Organism: Escherichia coli (2024 21:52)    Urinalysis with Rflx Culture (collected 2024 11:06)        ======I&O's======  I&O's Summary    2024 07:01  -  01 Dec 2024 07:00  --------------------------------------------------------  IN: 0 mL / OUT: 950 mL / NET: -950 mL

## 2024-12-01 NOTE — PROGRESS NOTE ADULT - PROBLEM SELECTOR PLAN 2
Hb 5.5 on admission s/p one unit pRBC transfusion with repeat Hb>7  may be due to GI bleed    - obtain iron studies and hemolytic workup  - B12 WNL, Folate supratherapeutic  - maintain 2 large bore IVs and PPI BID  - maintain active T/S  - addition 1 unit pRBC AM on 11/29  - GI EGD above Hb 5.5 on admission s/p one unit pRBC transfusion with repeat Hb>7  Likely due to GI bleed (gastric AVM), now s/p EGD w/ argon plasma coagulation  h/h 8.8/27.4 on 12/1    - B12 WNL, Folate supratherapeutic  - maintain 2 large bore IVs and PPI BID  - maintain active T/S  - additional 1 unit pRBC AM on 11/29  - GI EGD above

## 2024-12-01 NOTE — PROGRESS NOTE ADULT - PROBLEM SELECTOR PLAN 1
patient hypotensive on admission  could be due to hypovolemic/hemorrhagic in setting of possible GI bleed given anemia on presentation  may also be due to septic shock, source can be proctitis   s/p fluid and pRBC resuscitation in ED    PLAN  - continue with zosyn  - d/c fluids, reduce solu-cortef to 50 mg BID, midodrine PRN as needed   - follow up infectious workup UCx and blood cx (RVP and UA -)  - TTE   - appreciate GI recs, s/p EGD with concern for oozing gastric AVM  - maintain 2 large bore IVs and PPI BID patient hypotensive on admission  could be due to hypovolemic/hemorrhagic in setting of possible GI bleed given anemia on presentation  may also be due to septic shock, source can be proctitis   s/p fluid and pRBC resuscitation in ED    PLAN  - continue with zosyn  - d/c fluids, solu-cortef, and midodrine. Start oral steroid taper.  - follow up infectious workup UCx and blood cx (RVP and UA -)  - TTE   - appreciate GI recs, s/p EGD with concern for oozing gastric AVM  - maintain 2 large bore IVs and PPI BID

## 2024-12-01 NOTE — PROGRESS NOTE ADULT - PROBLEM SELECTOR PLAN 3
CT scan showing proctitis which may be source of sepsis    - continue with zosyn  - bowel regimen CT scan showing proctitis which may be source of sepsis. Also w/ possible urinary stones    - continue with zosyn  - bowel regimen  - Encourage hydration

## 2024-12-02 DIAGNOSIS — F22 DELUSIONAL DISORDERS: ICD-10-CM

## 2024-12-02 DIAGNOSIS — F32.9 MAJOR DEPRESSIVE DISORDER, SINGLE EPISODE, UNSPECIFIED: ICD-10-CM

## 2024-12-02 LAB
ALBUMIN SERPL ELPH-MCNC: 3.4 G/DL — SIGNIFICANT CHANGE UP (ref 3.3–5)
ALP SERPL-CCNC: 57 U/L — SIGNIFICANT CHANGE UP (ref 40–120)
ALT FLD-CCNC: 19 U/L — SIGNIFICANT CHANGE UP (ref 4–33)
ANION GAP SERPL CALC-SCNC: 8 MMOL/L — SIGNIFICANT CHANGE UP (ref 7–14)
ANISOCYTOSIS BLD QL: SIGNIFICANT CHANGE UP
AST SERPL-CCNC: 18 U/L — SIGNIFICANT CHANGE UP (ref 4–32)
BASOPHILS # BLD AUTO: 0.05 K/UL — SIGNIFICANT CHANGE UP (ref 0–0.2)
BASOPHILS NFR BLD AUTO: 1.8 % — SIGNIFICANT CHANGE UP (ref 0–2)
BILIRUB SERPL-MCNC: 0.4 MG/DL — SIGNIFICANT CHANGE UP (ref 0.2–1.2)
BUN SERPL-MCNC: 16 MG/DL — SIGNIFICANT CHANGE UP (ref 7–23)
BURR CELLS BLD QL SMEAR: PRESENT — SIGNIFICANT CHANGE UP
CALCIUM SERPL-MCNC: 8.4 MG/DL — SIGNIFICANT CHANGE UP (ref 8.4–10.5)
CHLORIDE SERPL-SCNC: 106 MMOL/L — SIGNIFICANT CHANGE UP (ref 98–107)
CO2 SERPL-SCNC: 28 MMOL/L — SIGNIFICANT CHANGE UP (ref 22–31)
CREAT SERPL-MCNC: 0.32 MG/DL — LOW (ref 0.5–1.3)
EGFR: 117 ML/MIN/1.73M2 — SIGNIFICANT CHANGE UP
EOSINOPHIL # BLD AUTO: 0.05 K/UL — SIGNIFICANT CHANGE UP (ref 0–0.5)
EOSINOPHIL NFR BLD AUTO: 1.7 % — SIGNIFICANT CHANGE UP (ref 0–6)
GIANT PLATELETS BLD QL SMEAR: PRESENT — SIGNIFICANT CHANGE UP
GLUCOSE BLDC GLUCOMTR-MCNC: 95 MG/DL — SIGNIFICANT CHANGE UP (ref 70–99)
GLUCOSE SERPL-MCNC: 118 MG/DL — HIGH (ref 70–99)
HCT VFR BLD CALC: 25 % — LOW (ref 34.5–45)
HGB BLD-MCNC: 8.2 G/DL — LOW (ref 11.5–15.5)
IANC: 1.04 K/UL — LOW (ref 1.8–7.4)
LYMPHOCYTES # BLD AUTO: 1.32 K/UL — SIGNIFICANT CHANGE UP (ref 1–3.3)
LYMPHOCYTES # BLD AUTO: 46.5 % — HIGH (ref 13–44)
MACROCYTES BLD QL: SIGNIFICANT CHANGE UP
MAGNESIUM SERPL-MCNC: 2.1 MG/DL — SIGNIFICANT CHANGE UP (ref 1.6–2.6)
MCHC RBC-ENTMCNC: 29.7 PG — SIGNIFICANT CHANGE UP (ref 27–34)
MCHC RBC-ENTMCNC: 32.8 G/DL — SIGNIFICANT CHANGE UP (ref 32–36)
MCV RBC AUTO: 90.6 FL — SIGNIFICANT CHANGE UP (ref 80–100)
MONOCYTES # BLD AUTO: 0.47 K/UL — SIGNIFICANT CHANGE UP (ref 0–0.9)
MONOCYTES NFR BLD AUTO: 16.7 % — HIGH (ref 2–14)
NEUTROPHILS # BLD AUTO: 0.95 K/UL — LOW (ref 1.8–7.4)
NEUTROPHILS NFR BLD AUTO: 33.3 % — LOW (ref 43–77)
PHOSPHATE SERPL-MCNC: 2.8 MG/DL — SIGNIFICANT CHANGE UP (ref 2.5–4.5)
PLAT MORPH BLD: NORMAL — SIGNIFICANT CHANGE UP
PLATELET # BLD AUTO: 179 K/UL — SIGNIFICANT CHANGE UP (ref 150–400)
PLATELET COUNT - ESTIMATE: ABNORMAL
POIKILOCYTOSIS BLD QL AUTO: SIGNIFICANT CHANGE UP
POTASSIUM SERPL-MCNC: 4 MMOL/L — SIGNIFICANT CHANGE UP (ref 3.5–5.3)
POTASSIUM SERPL-SCNC: 4 MMOL/L — SIGNIFICANT CHANGE UP (ref 3.5–5.3)
PROT SERPL-MCNC: 5.2 G/DL — LOW (ref 6–8.3)
RBC # BLD: 2.76 M/UL — LOW (ref 3.8–5.2)
RBC # FLD: 23.3 % — HIGH (ref 10.3–14.5)
RBC BLD AUTO: ABNORMAL
SODIUM SERPL-SCNC: 142 MMOL/L — SIGNIFICANT CHANGE UP (ref 135–145)
WBC # BLD: 2.84 K/UL — LOW (ref 3.8–10.5)
WBC # FLD AUTO: 2.84 K/UL — LOW (ref 3.8–10.5)

## 2024-12-02 PROCEDURE — 90792 PSYCH DIAG EVAL W/MED SRVCS: CPT

## 2024-12-02 PROCEDURE — 99232 SBSQ HOSP IP/OBS MODERATE 35: CPT | Mod: GC

## 2024-12-02 RX ORDER — PANTOPRAZOLE SODIUM 40 MG/1
40 TABLET, DELAYED RELEASE ORAL
Refills: 0 | Status: DISCONTINUED | OUTPATIENT
Start: 2024-12-02 | End: 2024-12-04

## 2024-12-02 RX ORDER — ACETAMINOPHEN 500MG 500 MG/1
675 TABLET, COATED ORAL ONCE
Refills: 0 | Status: COMPLETED | OUTPATIENT
Start: 2024-12-02 | End: 2024-12-02

## 2024-12-02 RX ORDER — HALOPERIDOL 2 MG
0.5 TABLET ORAL EVERY 6 HOURS
Refills: 0 | Status: DISCONTINUED | OUTPATIENT
Start: 2024-12-02 | End: 2024-12-04

## 2024-12-02 RX ORDER — LORAZEPAM 2 MG/1
1 TABLET ORAL ONCE
Refills: 0 | Status: DISCONTINUED | OUTPATIENT
Start: 2024-12-02 | End: 2024-12-02

## 2024-12-02 RX ADMIN — Medication 1 TABLET(S): at 22:30

## 2024-12-02 RX ADMIN — Medication 20 MILLIGRAM(S): at 22:30

## 2024-12-02 RX ADMIN — Medication 5 MILLIGRAM(S): at 22:30

## 2024-12-02 RX ADMIN — GABAPENTIN 300 MILLIGRAM(S): 300 CAPSULE ORAL at 13:20

## 2024-12-02 RX ADMIN — ACETAMINOPHEN 500MG 270 MILLIGRAM(S): 500 TABLET, COATED ORAL at 09:47

## 2024-12-02 RX ADMIN — Medication 1 APPLICATION(S): at 18:48

## 2024-12-02 RX ADMIN — Medication 0.5 MILLIGRAM(S): at 09:53

## 2024-12-02 RX ADMIN — Medication 20 MILLIGRAM(S): at 11:23

## 2024-12-02 RX ADMIN — GABAPENTIN 300 MILLIGRAM(S): 300 CAPSULE ORAL at 22:30

## 2024-12-02 RX ADMIN — TRAZODONE HYDROCHLORIDE 100 MILLIGRAM(S): 150 TABLET ORAL at 22:56

## 2024-12-02 RX ADMIN — ESCITALOPRAM OXALATE 20 MILLIGRAM(S): 10 TABLET, FILM COATED ORAL at 13:21

## 2024-12-02 RX ADMIN — ACETAMINOPHEN 500MG 675 MILLIGRAM(S): 500 TABLET, COATED ORAL at 10:15

## 2024-12-02 RX ADMIN — Medication 1: at 18:49

## 2024-12-02 RX ADMIN — Medication 20 MILLIGRAM(S): at 22:56

## 2024-12-02 RX ADMIN — LAMOTRIGINE 100 MILLIGRAM(S): 50 TABLET, EXTENDED RELEASE ORAL at 18:49

## 2024-12-02 NOTE — PROVIDER CONTACT NOTE (OTHER) - ACTION/TREATMENT ORDERED:
1 time dose of IV Ativan
No new orders have been placed at this time.
Will continue to further educate patient on the benefits of routine (q2hour) repositioning to prevent skin breakdown.

## 2024-12-02 NOTE — PROVIDER CONTACT NOTE (OTHER) - ASSESSMENT
Patient is A&Ox3-4, no s/s of chest pain, dizziness or SOB. No s/s of any generalized pain. No s/s of distress noted.
Patient received AOx4, anxious and forgetful and times. Patient would refuse every option given by both the nurse and PCA's to reposition her off her pressure points and make her comfortable.
Patient was woken up, A&Ox4, denies any chest pain, dizziness or SOB. Patient denies any generalized pain. No s/s of distress noted.

## 2024-12-02 NOTE — BH CONSULTATION LIAISON ASSESSMENT NOTE - NSBHATTESTAPPAMEND_PSY_A_CORE
I have personally seen and examined this patient. I fully participated in the care of this patient. I have made amendments to the documentation where appropriate and otherwise agree with the history, physical exam, and plan as documented by the XAVI

## 2024-12-02 NOTE — BH CONSULTATION LIAISON ASSESSMENT NOTE - NSSUICRSKFACTOR_PSY_ALL_CORE
Current and Past Psychiatric Diagnoses/Activating Events/Stressors FAMILY HISTORY:  Mother  Still living? Unknown  Family history of diabetes mellitus, Age at diagnosis: Age Unknown

## 2024-12-02 NOTE — PROGRESS NOTE ADULT - PROBLEM SELECTOR PLAN 4
hx of MS and no longer taking Vumerity due to leukopenia     - continue with gabapentin, lamictal, and baclofen CT scan showing proctitis which may be source of sepsis. Also w/ possible urinary stones    - continue with zosyn  - bowel regimen  - Encourage hydration

## 2024-12-02 NOTE — PATIENT PROFILE ADULT - FALL HARM RISK - HARM RISK INTERVENTIONS
Assistance with ambulation/Assistance OOB with selected safe patient handling equipment/Communicate Risk of Fall with Harm to all staff/Discuss with provider need for PT consult/Monitor gait and stability/Reinforce activity limits and safety measures with patient and family/Tailored Fall Risk Interventions/Visual Cue: Yellow wristband and red socks/Bed in lowest position, wheels locked, appropriate side rails in place/Call bell, personal items and telephone in reach/Instruct patient to call for assistance before getting out of bed or chair/Non-slip footwear when patient is out of bed/[place] to call system/Physically safe environment - no spills, clutter or unnecessary equipment/Purposeful Proactive Rounding/Room/bathroom lighting operational, light cord in reach

## 2024-12-02 NOTE — PROGRESS NOTE ADULT - PROBLEM SELECTOR PLAN 3
CT scan showing proctitis which may be source of sepsis. Also w/ possible urinary stones    - continue with zosyn  - bowel regimen  - Encourage hydration Patient with unclear psych hx and psych follow-up. On numerous psychiatric medications outpatient. Increasingly paranoid throughout hospitalization, possibly in setting of stress dose steroids vs. underlying medical or psychiatric condition.    Plan  - d/c steroids 12/2  - psychiatry consult

## 2024-12-02 NOTE — BH CONSULTATION LIAISON ASSESSMENT NOTE - NSBHCHARTREVIEWVS_PSY_A_CORE FT
Vital Signs Last 24 Hrs  T(C): 36.6 (02 Dec 2024 01:40), Max: 36.7 (01 Dec 2024 21:40)  T(F): 97.9 (02 Dec 2024 01:40), Max: 98 (01 Dec 2024 21:40)  HR: 72 (02 Dec 2024 01:40) (71 - 72)  BP: 124/79 (02 Dec 2024 01:40) (109/61 - 124/79)  BP(mean): --  RR: 17 (02 Dec 2024 01:40) (17 - 18)  SpO2: 100% (02 Dec 2024 01:40) (99% - 100%)    Parameters below as of 02 Dec 2024 01:40  Patient On (Oxygen Delivery Method): room air

## 2024-12-02 NOTE — BH CONSULTATION LIAISON ASSESSMENT NOTE - NSBHCHARTREVIEWLAB_PSY_A_CORE FT
8.8    2.26  )-----------( 149      ( 01 Dec 2024 05:46 )             27.4   12-01    142  |  109[H]  |  16  ----------------------------<  83  4.1   |  19[L]  |  0.23[L]    Ca    8.4      01 Dec 2024 05:46  Phos  2.5     12-01  Mg     2.20     12-01    TPro  5.4[L]  /  Alb  3.2[L]  /  TBili  0.5  /  DBili  x   /  AST  26  /  ALT  23  /  AlkPhos  57  12-01

## 2024-12-02 NOTE — BH CONSULTATION LIAISON ASSESSMENT NOTE - SUMMARY
Patient is a 65 yo F with pmhx of MS and R femoral fx s/p hip arthroplasty comes to the ED after experiencing palpitations found to be in undifferentiated hemorrhagic/hypovolemic vs septic shock. Also found to be FOBT+; has proctitis. Patient comes from home, lives alone with 24 hr HHA. Patient with PPHx of depression, no inpatient admissions, no SA. No substance abuse.  psychiatry called for depression and paranoia.    PLAN  - routine observation, no SI or HI  -- can consider 2 staff approach due to paranoia surrounding care  - MEDICATIONS  -- xanax - I stop confirmed 0.5mg BID PRN  -- Lexapro 20mg daily   -- Adderall - I stop confirmed 20mg qam  -- trazodone 100mg qhs ( HOLD for lethargy )  - PRN for agitation: Haldol 0.5mg q6hrs  Patient is a 63 yo F with pmhx of MS and R femoral fx s/p hip arthroplasty comes to the ED after experiencing palpitations found to be in undifferentiated hemorrhagic/hypovolemic vs septic shock. Also found to be FOBT+; has proctitis. Patient comes from home, lives alone with 24 hr HHA. Patient with PPHx of depression, no inpatient admissions, no SA. No substance abuse.  psychiatry called for depression and paranoia.    PLAN  - routine observation, no SI or HI  -- can consider 2 staff approach due to paranoia surrounding care  - MEDICATIONS  -- xanax - I stop confirmed 0.5mg BID PRN  -- Lexapro 20mg daily   -- Adderall - I stop confirmed 20mg qam  -- trazodone 100mg qhs ( HOLD for lethargy )  - Steroids stopped by primary team, CL will follow and monitor symptoms   - PRN for mild agitation: Seroquel 25mg q6hrs PRN  - PRN for severe agitation: Haldol 0.5mg q6hrs PRN

## 2024-12-02 NOTE — PROGRESS NOTE ADULT - PROBLEM SELECTOR PLAN 1
patient hypotensive on admission  could be due to hypovolemic/hemorrhagic in setting of possible GI bleed given anemia on presentation  may also be due to septic shock, source can be proctitis   s/p fluid and pRBC resuscitation in ED    PLAN  - continue with zosyn  - d/c fluids, solu-cortef, and midodrine. Start oral steroid taper.  - follow up infectious workup UCx and blood cx (RVP and UA -)  - TTE   - appreciate GI recs, s/p EGD with concern for oozing gastric AVM  - maintain 2 large bore IVs and PPI BID patient hypotensive on admission  could be due to hypovolemic/hemorrhagic in setting of possible GI bleed given anemia on presentation  may also be due to septic shock, source can be proctitis   s/p fluid and pRBC resuscitation in ED    PLAN  - continue with zosyn  - d/c fluids, solu-cortef, and midodrine. Start oral steroid taper.  - follow up infectious workup UCx and blood cx (RVP and UA -)  - TTE w/ LVEF 60-65%  - appreciate GI recs, s/p EGD with concern for oozing gastric AVM  - maintain 2 large bore IVs and PPI BID patient hypotensive on admission  could be due to hypovolemic/hemorrhagic in setting of possible GI bleed given anemia on presentation  may also be due to septic shock, source can be proctitis   s/p fluid and pRBC resuscitation in ED    PLAN  - d/c Zosyn on 12/2  - d/c fluids, solu-cortef, and midodrine. Started oral steroid taper on 12/1, d/c'd on 12/2.  - follow up infectious workup UCx and blood cx (RVP and UA -)  - TTE w/ LVEF 60-65%  - appreciate GI recs, s/p EGD with concern for oozing gastric AVM  - maintain 2 large bore IVs and PPI BID

## 2024-12-02 NOTE — PROGRESS NOTE ADULT - PROBLEM SELECTOR PLAN 5
continue with trazodone, xanax, lexapro, and adderall hx of MS and no longer taking Vumerity due to leukopenia     - continue with gabapentin, lamictal, and baclofen

## 2024-12-02 NOTE — PROGRESS NOTE ADULT - PROBLEM SELECTOR PLAN 6
Diet: AAT  Dispo: pending additional workup  Code: Full  DVT ppx: SCDs -continue with trazodone, xanax, lexapro, and adderall

## 2024-12-02 NOTE — BH CONSULTATION LIAISON ASSESSMENT NOTE - CURRENT MEDICATION
MEDICATIONS  (STANDING):  amphetamine/dextroamphetamine XR 20 milliGRAM(s) Oral with breakfast  atorvastatin 20 milliGRAM(s) Oral at bedtime  baclofen 20 milliGRAM(s) Oral every 12 hours  bisacodyl 5 milliGRAM(s) Oral at bedtime  chlorhexidine 2% Cloths 1 Application(s) Topical <User Schedule>  dextrose 5%. 1000 milliLiter(s) (100 mL/Hr) IV Continuous <Continuous>  dextrose 5%. 1000 milliLiter(s) (50 mL/Hr) IV Continuous <Continuous>  dextrose 50% Injectable 25 Gram(s) IV Push once  dextrose 50% Injectable 12.5 Gram(s) IV Push once  dextrose 50% Injectable 25 Gram(s) IV Push once  escitalopram 20 milliGRAM(s) Oral daily  gabapentin 300 milliGRAM(s) Oral every 8 hours  glucagon  Injectable 1 milliGRAM(s) IntraMuscular once  insulin lispro (ADMELOG) corrective regimen sliding scale   SubCutaneous three times a day before meals  insulin lispro (ADMELOG) corrective regimen sliding scale   SubCutaneous at bedtime  lamoTRIgine 100 milliGRAM(s) Oral two times a day  mupirocin 2% Nasal 1 Application(s) Both Nostrils two times a day  pantoprazole    Tablet 40 milliGRAM(s) Oral two times a day  polyethylene glycol 3350 17 Gram(s) Oral daily  senna 1 Tablet(s) Oral at bedtime  traZODone 100 milliGRAM(s) Oral at bedtime    MEDICATIONS  (PRN):  acetaminophen     Tablet .. 650 milliGRAM(s) Oral every 6 hours PRN Temp greater or equal to 38C (100.4F), Mild Pain (1 - 3)  albuterol/ipratropium for Nebulization 3 milliLiter(s) Nebulizer every 6 hours PRN Shortness of Breath and/or Wheezing  ALPRAZolam 0.5 milliGRAM(s) Oral two times a day PRN anxiety  bisacodyl Suppository 10 milliGRAM(s) Rectal daily PRN Constipation  dextrose Oral Gel 15 Gram(s) Oral once PRN Blood Glucose LESS THAN 70 milliGRAM(s)/deciliter

## 2024-12-02 NOTE — PROVIDER CONTACT NOTE (OTHER) - SITUATION
Patient oxygen saturation desaturated to the 60s but did not sustain. Oxygen went back up to 99%/100% - patient was sleeping during desaturation.
Patient refusing morning vitals, morning labs, morning medication. Patient extremely anxious but won't allow PRN medication to be administered. Patient yelling, cursing, and refusing help from nurses.
Patient received to 4N and adamantly refusing repositioning offered by both the RN's and PCA's on the floor. Patient is anxious and disagrees with every intervention offered. ACP Kevan Lazo made aware

## 2024-12-02 NOTE — PROGRESS NOTE ADULT - PROBLEM SELECTOR PLAN 2
Hb 5.5 on admission s/p one unit pRBC transfusion with repeat Hb>7  Likely due to GI bleed (gastric AVM), now s/p EGD w/ argon plasma coagulation  h/h 8.8/27.4 on 12/1    - B12 WNL, Folate supratherapeutic  - maintain 2 large bore IVs and PPI BID  - maintain active T/S  - additional 1 unit pRBC AM on 11/29  - GI EGD above

## 2024-12-02 NOTE — BH CONSULTATION LIAISON ASSESSMENT NOTE - HPI (INCLUDE ILLNESS QUALITY, SEVERITY, DURATION, TIMING, CONTEXT, MODIFYING FACTORS, ASSOCIATED SIGNS AND SYMPTOMS)
Patient is a 65 yo F with pmhx of MS and R femoral fx s/p hip arthroplasty comes to the ED after experiencing palpitations found to be in undifferentiated hemorrhagic/hypovolemic vs septic shock. Also found to be FOBT+; has proctitis. Patient comes from home, lives alone with 24 hr HHA. Patient with PPHx of depression, no inpatient admissions, no SA. No substance abuse.  psychiatry called for depression and paranoia.    Patient was seen and assessed at bedside. patient is alert and oriented, calm, cooperative with writer. Has been uncooperative today, refusing labs and care at times. Patient states the staff is trying to harm her - focuses and perseverates on staff trying to hurt her bed sore but either no repositioning her correctly or not turning her off her pressure injury. Chart review shows patient has refused staff to position her. Patient feels she was treated "different and better" when on [facility]. Does not explain or have reaoning on why staff would try and harm her. Believes staff is who they say they are. Patient has no thoughts of HI. Also has no SI. States her medication has been helping with depression. Wants to stay on psychotropic medication she came in on. No s.s of jodi noted. Patient is linear and without pressured speech. no ah or vh.  Patient is a 65 yo F with pmhx of MS and R femoral fx s/p hip arthroplasty comes to the ED after experiencing palpitations found to be in undifferentiated hemorrhagic/hypovolemic vs septic shock. Also found to be FOBT+; has proctitis. Patient comes from home, lives alone with 24 hr HHA. Patient with PPHx of depression, no inpatient admissions, no SA. No substance abuse.  psychiatry called for depression and paranoia.    Patient was seen and assessed at bedside. patient is alert and oriented, calm, cooperative with writer. Has been uncooperative today, refusing labs and care at times. Patient states the staff is trying to harm her - focuses and perseverates on staff trying to hurt her bed sore but either no repositioning her correctly or not turning her off her pressure injury. Chart review shows patient has refused staff to position her. Patient feels she was treated "different and better" when on [facility]. Does not explain or have reaoning on why staff would try and harm her. Believes staff is who they say they are. Patient has no thoughts of HI. Also has no SI. States her medication has been helping with depression. Wants to stay on psychotropic medication she came in on. No s.s of jodi noted. Patient is linear and without pressured speech. no ah or vh.       Spoke with mother Jessenia - saw daughter yesterday - says patient was herself and "doing just fine." Patient with no hx of psychosis. States the last few days have been exhausting with numerous medical interventions and feels patient may "not be well from all of this". Does identify patient as having a hx of depression as MS is isolating. No acute safety concerns.  Patient is a 65 yo F with pmhx of MS and R femoral fx s/p hip arthroplasty comes to the ED after experiencing palpitations found to be in undifferentiated hemorrhagic/hypovolemic vs septic shock. Also found to be FOBT+; has proctitis. Patient comes from home, lives alone with 24 hr HHA. Patient with PPHx of depression, no inpatient admissions, no SA. No substance abuse.  psychiatry called for depression and paranoia.    Patient was seen and assessed at bedside. patient is alert and oriented, calm, cooperative with writer. Has been uncooperative today, refusing labs and care at times. Patient states the staff is trying to harm her - focuses and perseverates on staff trying to hurt her bed sore but either no repositioning her correctly or not turning her off her pressure injury. Chart review shows patient has refused staff to position her. Patient feels she was treated "different and better" when on [facility]. Does not explain or have reasoning on why staff would try and harm her. Believes staff is who they say they are. Patient has no thoughts of HI. Also has no SI. States her medication has been helping with depression. Wants to stay on psychotropic medication she came in on. No s.s of jodi noted. Patient is linear and without pressured speech. no ah or vh.       Spoke with mother Jessenia - saw daughter yesterday - says patient was herself and "doing just fine." Patient with no hx of psychosis. States the last few days have been exhausting with numerous medical interventions and feels patient may "not be well from all of this". Does identify patient as having a hx of depression as MS is isolating. No acute safety concerns.

## 2024-12-02 NOTE — PROVIDER CONTACT NOTE (OTHER) - BACKGROUND
Patient is a 64 y.o female with pmhx of Multiple sclerosis, depression and R femoral fx. Patient was admitted for hypovolemic shock.
Patient is a 64 y.o female with pmhx of Multiple sclerosis, depression and R femoral fx. Patient was admitted for hypovolemic shock.
65 y/o female with PMHx of MS presenting for dyspnea and vomiting. Patient found to have abdominal pain, tachycardia, and hypotension in ED

## 2024-12-03 ENCOUNTER — TRANSCRIPTION ENCOUNTER (OUTPATIENT)
Age: 64
End: 2024-12-03

## 2024-12-03 DIAGNOSIS — R57.1 HYPOVOLEMIC SHOCK: ICD-10-CM

## 2024-12-03 LAB
ANION GAP SERPL CALC-SCNC: 11 MMOL/L — SIGNIFICANT CHANGE UP (ref 7–14)
BASOPHILS # BLD AUTO: 0.01 K/UL — SIGNIFICANT CHANGE UP (ref 0–0.2)
BASOPHILS NFR BLD AUTO: 0.4 % — SIGNIFICANT CHANGE UP (ref 0–2)
BUN SERPL-MCNC: 18 MG/DL — SIGNIFICANT CHANGE UP (ref 7–23)
CALCIUM SERPL-MCNC: 8.1 MG/DL — LOW (ref 8.4–10.5)
CHLORIDE SERPL-SCNC: 107 MMOL/L — SIGNIFICANT CHANGE UP (ref 98–107)
CO2 SERPL-SCNC: 26 MMOL/L — SIGNIFICANT CHANGE UP (ref 22–31)
CREAT SERPL-MCNC: 0.39 MG/DL — LOW (ref 0.5–1.3)
CULTURE RESULTS: SIGNIFICANT CHANGE UP
CULTURE RESULTS: SIGNIFICANT CHANGE UP
EGFR: 112 ML/MIN/1.73M2 — SIGNIFICANT CHANGE UP
EOSINOPHIL # BLD AUTO: 0.04 K/UL — SIGNIFICANT CHANGE UP (ref 0–0.5)
EOSINOPHIL NFR BLD AUTO: 1.4 % — SIGNIFICANT CHANGE UP (ref 0–6)
GLUCOSE SERPL-MCNC: 93 MG/DL — SIGNIFICANT CHANGE UP (ref 70–99)
HCT VFR BLD CALC: 23.4 % — LOW (ref 34.5–45)
HGB BLD-MCNC: 7.6 G/DL — LOW (ref 11.5–15.5)
IANC: 0.87 K/UL — LOW (ref 1.8–7.4)
IMM GRANULOCYTES NFR BLD AUTO: 0 % — SIGNIFICANT CHANGE UP (ref 0–0.9)
LYMPHOCYTES # BLD AUTO: 1.36 K/UL — SIGNIFICANT CHANGE UP (ref 1–3.3)
LYMPHOCYTES # BLD AUTO: 48.7 % — HIGH (ref 13–44)
MAGNESIUM SERPL-MCNC: 2.1 MG/DL — SIGNIFICANT CHANGE UP (ref 1.6–2.6)
MCHC RBC-ENTMCNC: 29.5 PG — SIGNIFICANT CHANGE UP (ref 27–34)
MCHC RBC-ENTMCNC: 32.5 G/DL — SIGNIFICANT CHANGE UP (ref 32–36)
MCV RBC AUTO: 90.7 FL — SIGNIFICANT CHANGE UP (ref 80–100)
MONOCYTES # BLD AUTO: 0.51 K/UL — SIGNIFICANT CHANGE UP (ref 0–0.9)
MONOCYTES NFR BLD AUTO: 18.3 % — HIGH (ref 2–14)
NEUTROPHILS # BLD AUTO: 0.87 K/UL — LOW (ref 1.8–7.4)
NEUTROPHILS NFR BLD AUTO: 31.2 % — LOW (ref 43–77)
NRBC # BLD: 0 /100 WBCS — SIGNIFICANT CHANGE UP (ref 0–0)
NRBC # FLD: 0 K/UL — SIGNIFICANT CHANGE UP (ref 0–0)
PHOSPHATE SERPL-MCNC: 2.7 MG/DL — SIGNIFICANT CHANGE UP (ref 2.5–4.5)
PLATELET # BLD AUTO: 148 K/UL — LOW (ref 150–400)
POTASSIUM SERPL-MCNC: 3.9 MMOL/L — SIGNIFICANT CHANGE UP (ref 3.5–5.3)
POTASSIUM SERPL-SCNC: 3.9 MMOL/L — SIGNIFICANT CHANGE UP (ref 3.5–5.3)
RBC # BLD: 2.58 M/UL — LOW (ref 3.8–5.2)
RBC # FLD: 22.7 % — HIGH (ref 10.3–14.5)
SODIUM SERPL-SCNC: 144 MMOL/L — SIGNIFICANT CHANGE UP (ref 135–145)
SPECIMEN SOURCE: SIGNIFICANT CHANGE UP
SPECIMEN SOURCE: SIGNIFICANT CHANGE UP
WBC # BLD: 2.79 K/UL — LOW (ref 3.8–10.5)
WBC # FLD AUTO: 2.79 K/UL — LOW (ref 3.8–10.5)

## 2024-12-03 PROCEDURE — 99231 SBSQ HOSP IP/OBS SF/LOW 25: CPT

## 2024-12-03 PROCEDURE — 99232 SBSQ HOSP IP/OBS MODERATE 35: CPT | Mod: GC

## 2024-12-03 RX ORDER — BACLOFEN 100 %
1 POWDER (GRAM) MISCELLANEOUS
Qty: 28 | Refills: 0
Start: 2024-12-03 | End: 2024-12-16

## 2024-12-03 RX ORDER — PANTOPRAZOLE SODIUM 40 MG/1
1 TABLET, DELAYED RELEASE ORAL
Qty: 14 | Refills: 0
Start: 2024-12-03 | End: 2024-12-16

## 2024-12-03 RX ORDER — ACETAMINOPHEN 500MG 500 MG/1
650 TABLET, COATED ORAL ONCE
Refills: 0 | Status: DISCONTINUED | OUTPATIENT
Start: 2024-12-03 | End: 2024-12-04

## 2024-12-03 RX ORDER — BACLOFEN 100 %
1 POWDER (GRAM) MISCELLANEOUS
Refills: 0 | DISCHARGE

## 2024-12-03 RX ADMIN — Medication 1 TABLET(S): at 21:47

## 2024-12-03 RX ADMIN — GABAPENTIN 300 MILLIGRAM(S): 300 CAPSULE ORAL at 21:47

## 2024-12-03 RX ADMIN — GABAPENTIN 300 MILLIGRAM(S): 300 CAPSULE ORAL at 06:35

## 2024-12-03 RX ADMIN — Medication 1 APPLICATION(S): at 06:35

## 2024-12-03 RX ADMIN — ACETAMINOPHEN 500MG 650 MILLIGRAM(S): 500 TABLET, COATED ORAL at 22:51

## 2024-12-03 RX ADMIN — Medication 0.5 MILLIGRAM(S): at 22:53

## 2024-12-03 RX ADMIN — ACETAMINOPHEN 500MG 650 MILLIGRAM(S): 500 TABLET, COATED ORAL at 09:43

## 2024-12-03 RX ADMIN — CHLORHEXIDINE GLUCONATE 1 APPLICATION(S): 1.2 RINSE ORAL at 06:36

## 2024-12-03 RX ADMIN — Medication 5 MILLIGRAM(S): at 21:47

## 2024-12-03 RX ADMIN — ACETAMINOPHEN 500MG 650 MILLIGRAM(S): 500 TABLET, COATED ORAL at 10:43

## 2024-12-03 RX ADMIN — ACETAMINOPHEN 500MG 650 MILLIGRAM(S): 500 TABLET, COATED ORAL at 17:41

## 2024-12-03 RX ADMIN — LAMOTRIGINE 100 MILLIGRAM(S): 50 TABLET, EXTENDED RELEASE ORAL at 17:41

## 2024-12-03 RX ADMIN — TRAZODONE HYDROCHLORIDE 100 MILLIGRAM(S): 150 TABLET ORAL at 21:47

## 2024-12-03 RX ADMIN — Medication 20 MILLIGRAM(S): at 17:42

## 2024-12-03 RX ADMIN — PANTOPRAZOLE SODIUM 40 MILLIGRAM(S): 40 TABLET, DELAYED RELEASE ORAL at 07:27

## 2024-12-03 RX ADMIN — GABAPENTIN 300 MILLIGRAM(S): 300 CAPSULE ORAL at 13:54

## 2024-12-03 RX ADMIN — Medication 1 APPLICATION(S): at 18:06

## 2024-12-03 RX ADMIN — Medication 1: at 13:55

## 2024-12-03 RX ADMIN — Medication 20 MILLIGRAM(S): at 06:34

## 2024-12-03 RX ADMIN — DEXTROAMPHETAMINE SACCHARATE, AMPHETAMINE ASPARTATE, DEXTROAMPHETAMINE SULFATE AND AMPHETAMINE SULFATE 20 MILLIGRAM(S): 1.25; 1.25; 1.25; 1.25 TABLET ORAL at 09:36

## 2024-12-03 RX ADMIN — Medication 0.5 MILLIGRAM(S): at 00:49

## 2024-12-03 RX ADMIN — ESCITALOPRAM OXALATE 20 MILLIGRAM(S): 10 TABLET, FILM COATED ORAL at 12:02

## 2024-12-03 RX ADMIN — Medication 20 MILLIGRAM(S): at 21:47

## 2024-12-03 RX ADMIN — LAMOTRIGINE 100 MILLIGRAM(S): 50 TABLET, EXTENDED RELEASE ORAL at 06:34

## 2024-12-03 NOTE — BH CONSULTATION LIAISON PROGRESS NOTE - NSBHFUPINTERVALHXFT_PSY_A_CORE
Patient was seen and assessed at bedside. Patient is alert, oriented, calm. Tearful when talking about her care ( states her aids know how to position her correctly as her muscles are atrophied) however denies others are trying to harm her on todays exam. patient perseverates on her bedsore and the pain it causes her. She has no AH or VH, no SI or HI. Hopeful to get proper wound care today.

## 2024-12-03 NOTE — BH CONSULTATION LIAISON PROGRESS NOTE - NSBHCHARTREVIEWVS_PSY_A_CORE FT
Vital Signs Last 24 Hrs  T(C): 36.8 (03 Dec 2024 05:50), Max: 36.8 (03 Dec 2024 05:50)  T(F): 98.2 (03 Dec 2024 05:50), Max: 98.2 (03 Dec 2024 05:50)  HR: 85 (03 Dec 2024 06:00) (70 - 91)  BP: 109/62 (03 Dec 2024 06:00) (93/50 - 122/81)  BP(mean): --  RR: 19 (03 Dec 2024 05:50) (15 - 19)  SpO2: 96% (03 Dec 2024 05:50) (96% - 100%)    Parameters below as of 03 Dec 2024 05:50  Patient On (Oxygen Delivery Method): room air

## 2024-12-03 NOTE — PROGRESS NOTE ADULT - PROBLEM SELECTOR PLAN 3
Patient with unclear psych hx and psych follow-up. On numerous psychiatric medications outpatient. Increasingly paranoid throughout hospitalization, possibly in setting of stress dose steroids vs. underlying medical or psychiatric condition.    Plan  - d/c steroids 12/2  - psychiatry consult Patient with unclear psych hx and psych follow-up. On numerous psychiatric medications outpatient. Increasingly paranoid throughout hospitalization, possibly in setting of stress dose steroids vs. underlying medical or psychiatric condition.    Plan  - d/c steroids 12/2  - psychiatry consult, recommending PRN Haldol or Seroquel for agitation

## 2024-12-03 NOTE — DISCHARGE NOTE PROVIDER - NSDCMRMEDTOKEN_GEN_ALL_CORE_FT
Adderall XR 20 mg oral capsule, extended release: 1 cap(s) orally once a day  baclofen 20 mg oral tablet: 1 tab(s) orally every 12 hours  gabapentin 300 mg oral tablet: 1 tab(s) orally every 8 hours  lamotrigine 100 mg oral tablet: 1 tab(s) orally 2 times a day  Lexapro 20 mg oral tablet: 1 tab(s) orally once a day  Lipitor 20 mg oral tablet: 1 tab(s) orally once a day  traZODone 100 mg oral tablet: 1 tab(s) orally once a day (at bedtime)  Xanax 0.5 mg oral tablet: 1 tab(s) orally 2 times a day as needed   Adderall XR 20 mg oral capsule, extended release: 1 cap(s) orally once a day  baclofen 20 mg oral tablet: 1 tab(s) orally every 12 hours MDD: 40  gabapentin 300 mg oral tablet: 1 tab(s) orally every 8 hours  lamotrigine 100 mg oral tablet: 1 tab(s) orally 2 times a day  Lexapro 20 mg oral tablet: 1 tab(s) orally once a day  Lipitor 20 mg oral tablet: 1 tab(s) orally once a day  pantoprazole 40 mg oral delayed release tablet: 1 tab(s) orally once a day (before a meal)  traZODone 100 mg oral tablet: 1 tab(s) orally once a day (at bedtime)  Xanax 0.5 mg oral tablet: 1 tab(s) orally 2 times a day as needed

## 2024-12-03 NOTE — PROGRESS NOTE ADULT - SUBJECTIVE AND OBJECTIVE BOX
Patient is a 64y old  Female who presents with a chief complaint of anemia, hypotension (02 Dec 2024 07:22)      ======Overnight/Subjective======  Overnight    Subjective    Brief daily plan    ======Medications======  MEDICATIONS  (STANDING):  amphetamine/dextroamphetamine XR 20 milliGRAM(s) Oral with breakfast  atorvastatin 20 milliGRAM(s) Oral at bedtime  baclofen 20 milliGRAM(s) Oral every 12 hours  bisacodyl 5 milliGRAM(s) Oral at bedtime  chlorhexidine 2% Cloths 1 Application(s) Topical <User Schedule>  dextrose 5%. 1000 milliLiter(s) (100 mL/Hr) IV Continuous <Continuous>  dextrose 5%. 1000 milliLiter(s) (50 mL/Hr) IV Continuous <Continuous>  dextrose 50% Injectable 25 Gram(s) IV Push once  dextrose 50% Injectable 12.5 Gram(s) IV Push once  dextrose 50% Injectable 25 Gram(s) IV Push once  escitalopram 20 milliGRAM(s) Oral daily  gabapentin 300 milliGRAM(s) Oral every 8 hours  glucagon  Injectable 1 milliGRAM(s) IntraMuscular once  insulin lispro (ADMELOG) corrective regimen sliding scale   SubCutaneous three times a day before meals  insulin lispro (ADMELOG) corrective regimen sliding scale   SubCutaneous at bedtime  lamoTRIgine 100 milliGRAM(s) Oral two times a day  mupirocin 2% Nasal 1 Application(s) Both Nostrils two times a day  pantoprazole    Tablet 40 milliGRAM(s) Oral before breakfast  polyethylene glycol 3350 17 Gram(s) Oral daily  senna 1 Tablet(s) Oral at bedtime  traZODone 100 milliGRAM(s) Oral at bedtime    MEDICATIONS  (PRN):  acetaminophen     Tablet .. 650 milliGRAM(s) Oral every 6 hours PRN Temp greater or equal to 38C (100.4F), Mild Pain (1 - 3)  albuterol/ipratropium for Nebulization 3 milliLiter(s) Nebulizer every 6 hours PRN Shortness of Breath and/or Wheezing  ALPRAZolam 0.5 milliGRAM(s) Oral two times a day PRN anxiety  bisacodyl Suppository 10 milliGRAM(s) Rectal daily PRN Constipation  dextrose Oral Gel 15 Gram(s) Oral once PRN Blood Glucose LESS THAN 70 milliGRAM(s)/deciliter  haloperidol     Tablet 0.5 milliGRAM(s) Oral every 6 hours PRN severe agitation  QUEtiapine 25 milliGRAM(s) Oral every 6 hours PRN mild agitation      ======Vital Signs======  T(C): 36.8 (24 @ 05:50), Max: 36.8 (24 @ 05:50)  T(F): 98.2 (24 @ 05:50), Max: 98.2 (24 @ 05:50)  HR: 83 (24 @ 05:50) (70 - 91)  BP: 93/50 (24 @ 05:50) (93/50 - 122/81)  BP(mean): --  RR: 19 (24 @ 05:50) (15 - 19)  SpO2: 96% (24 @ 05:50) (96% - 100%)    ======Physical exams======  GENERAL: NAD  Cardiovascular: RRR, S1 S2, no m/r/g, no JVD  LUNGS: Unlabored respiration, CTABL  ABDOMEN: Soft, NTND  EXTREMITIES: Warm extremities, no edema, peripheral pulses 2+ bilaterally  NEURO: AAOx3, PERRLA    ======Labs======                8.2[L]  2.84[L] >----------< 179  (MCV: 90.6)                25.0[L]   142 | 106 | 16  -----------------------< 118[H]  4.0 | 28 | 0.32[L]    TPro: 5.2[L] / Alb: 3.4 / TBili: 0.4 / DBili: -- / AlkPhos: 57 / ALT: 19 / AST: 18 (24 @ 15:34)  Ca: 8.4 / Phos: 2.8 / M.10 (24 @ 15:34)    Gas: 7.31[L] / 42 / 36 / 21[L] / 64.8[L]% / -4.8[L] (24 @ 17:49)      ======Microbiology======  Urinalysis Basic - ( 02 Dec 2024 15:34 )    Color: x / Appearance: x / SG: x / pH: x  Gluc: 118 mg/dL / Ketone: x  / Bili: x / Urobili: x   Blood: x / Protein: x / Nitrite: x   Leuk Esterase: x / RBC: x / WBC x   Sq Epi: x / Non Sq Epi: x / Bacteria: x          ======I&O's======  I&O's Summary       Patient is a 64y old  Female who presents with a chief complaint of anemia, hypotension (02 Dec 2024 07:22)      ======Overnight/Subjective======  No overnight events. Patient is not paranoid today. Patient has not had a bowel movement but does not feel uncomfortable. No other concerns at this time.    Brief daily plan  - f/u GI recs    ======Medications======  MEDICATIONS  (STANDING):  amphetamine/dextroamphetamine XR 20 milliGRAM(s) Oral with breakfast  atorvastatin 20 milliGRAM(s) Oral at bedtime  baclofen 20 milliGRAM(s) Oral every 12 hours  bisacodyl 5 milliGRAM(s) Oral at bedtime  chlorhexidine 2% Cloths 1 Application(s) Topical <User Schedule>  dextrose 5%. 1000 milliLiter(s) (100 mL/Hr) IV Continuous <Continuous>  dextrose 5%. 1000 milliLiter(s) (50 mL/Hr) IV Continuous <Continuous>  dextrose 50% Injectable 25 Gram(s) IV Push once  dextrose 50% Injectable 12.5 Gram(s) IV Push once  dextrose 50% Injectable 25 Gram(s) IV Push once  escitalopram 20 milliGRAM(s) Oral daily  gabapentin 300 milliGRAM(s) Oral every 8 hours  glucagon  Injectable 1 milliGRAM(s) IntraMuscular once  insulin lispro (ADMELOG) corrective regimen sliding scale   SubCutaneous three times a day before meals  insulin lispro (ADMELOG) corrective regimen sliding scale   SubCutaneous at bedtime  lamoTRIgine 100 milliGRAM(s) Oral two times a day  mupirocin 2% Nasal 1 Application(s) Both Nostrils two times a day  pantoprazole    Tablet 40 milliGRAM(s) Oral before breakfast  polyethylene glycol 3350 17 Gram(s) Oral daily  senna 1 Tablet(s) Oral at bedtime  traZODone 100 milliGRAM(s) Oral at bedtime    MEDICATIONS  (PRN):  acetaminophen     Tablet .. 650 milliGRAM(s) Oral every 6 hours PRN Temp greater or equal to 38C (100.4F), Mild Pain (1 - 3)  albuterol/ipratropium for Nebulization 3 milliLiter(s) Nebulizer every 6 hours PRN Shortness of Breath and/or Wheezing  ALPRAZolam 0.5 milliGRAM(s) Oral two times a day PRN anxiety  bisacodyl Suppository 10 milliGRAM(s) Rectal daily PRN Constipation  dextrose Oral Gel 15 Gram(s) Oral once PRN Blood Glucose LESS THAN 70 milliGRAM(s)/deciliter  haloperidol     Tablet 0.5 milliGRAM(s) Oral every 6 hours PRN severe agitation  QUEtiapine 25 milliGRAM(s) Oral every 6 hours PRN mild agitation      ======Vital Signs======  T(C): 36.8 (24 @ 05:50), Max: 36.8 (24 @ 05:50)  T(F): 98.2 (24 @ 05:50), Max: 98.2 (24 @ 05:50)  HR: 83 (24 @ 05:50) (70 - 91)  BP: 93/50 (24 @ 05:50) (93/50 - 122/81)  BP(mean): --  RR: 19 (24 @ 05:50) (15 - 19)  SpO2: 96% (24 @ 05:50) (96% - 100%)    ======Physical exams======  GENERAL: NAD  Psych: AAOx3. Soft spoken. Tangential.  Cardiovascular: Tachycardic, S1 S2, no m/r/g, no JVD  LUNGS: Unlabored respiration, CTABL  ABDOMEN: Soft, NTND  EXTREMITIES: Warm extremities, no edema, peripheral pulses 2+ bilaterally, upper and lower extremity contractures      ======Labs======                8.2[L]  2.84[L] >----------< 179  (MCV: 90.6)                25.0[L]   142 | 106 | 16  -----------------------< 118[H]  4.0 | 28 | 0.32[L]    TPro: 5.2[L] / Alb: 3.4 / TBili: 0.4 / DBili: -- / AlkPhos: 57 / ALT: 19 / AST: 18 (24 @ 15:34)  Ca: 8.4 / Phos: 2.8 / M.10 (24 @ 15:34)    Gas: 7.31[L] / 42 / 36 / 21[L] / 64.8[L]% / -4.8[L] (24 @ 17:49)      ======Microbiology======  Urinalysis Basic - ( 02 Dec 2024 15:34 )    Color: x / Appearance: x / SG: x / pH: x  Gluc: 118 mg/dL / Ketone: x  / Bili: x / Urobili: x   Blood: x / Protein: x / Nitrite: x   Leuk Esterase: x / RBC: x / WBC x   Sq Epi: x / Non Sq Epi: x / Bacteria: x          ======I&O's======  I&O's Summary       Patient is a 64y old  Female who presents with a chief complaint of anemia, hypotension (02 Dec 2024 07:22)      ======Overnight/Subjective======  No overnight events. Patient is not paranoid today. Patient has not had a bowel movement but does not feel uncomfortable. No other concerns at this time.    Brief daily plan  - f/u GI recs  - f/u wound care recs  - Notify HHA about pending discharge.    ======Medications======  MEDICATIONS  (STANDING):  amphetamine/dextroamphetamine XR 20 milliGRAM(s) Oral with breakfast  atorvastatin 20 milliGRAM(s) Oral at bedtime  baclofen 20 milliGRAM(s) Oral every 12 hours  bisacodyl 5 milliGRAM(s) Oral at bedtime  chlorhexidine 2% Cloths 1 Application(s) Topical <User Schedule>  dextrose 5%. 1000 milliLiter(s) (100 mL/Hr) IV Continuous <Continuous>  dextrose 5%. 1000 milliLiter(s) (50 mL/Hr) IV Continuous <Continuous>  dextrose 50% Injectable 25 Gram(s) IV Push once  dextrose 50% Injectable 12.5 Gram(s) IV Push once  dextrose 50% Injectable 25 Gram(s) IV Push once  escitalopram 20 milliGRAM(s) Oral daily  gabapentin 300 milliGRAM(s) Oral every 8 hours  glucagon  Injectable 1 milliGRAM(s) IntraMuscular once  insulin lispro (ADMELOG) corrective regimen sliding scale   SubCutaneous three times a day before meals  insulin lispro (ADMELOG) corrective regimen sliding scale   SubCutaneous at bedtime  lamoTRIgine 100 milliGRAM(s) Oral two times a day  mupirocin 2% Nasal 1 Application(s) Both Nostrils two times a day  pantoprazole    Tablet 40 milliGRAM(s) Oral before breakfast  polyethylene glycol 3350 17 Gram(s) Oral daily  senna 1 Tablet(s) Oral at bedtime  traZODone 100 milliGRAM(s) Oral at bedtime    MEDICATIONS  (PRN):  acetaminophen     Tablet .. 650 milliGRAM(s) Oral every 6 hours PRN Temp greater or equal to 38C (100.4F), Mild Pain (1 - 3)  albuterol/ipratropium for Nebulization 3 milliLiter(s) Nebulizer every 6 hours PRN Shortness of Breath and/or Wheezing  ALPRAZolam 0.5 milliGRAM(s) Oral two times a day PRN anxiety  bisacodyl Suppository 10 milliGRAM(s) Rectal daily PRN Constipation  dextrose Oral Gel 15 Gram(s) Oral once PRN Blood Glucose LESS THAN 70 milliGRAM(s)/deciliter  haloperidol     Tablet 0.5 milliGRAM(s) Oral every 6 hours PRN severe agitation  QUEtiapine 25 milliGRAM(s) Oral every 6 hours PRN mild agitation      ======Vital Signs======  T(C): 36.8 (24 @ 05:50), Max: 36.8 (24 @ 05:50)  T(F): 98.2 (24 @ 05:50), Max: 98.2 (24 @ 05:50)  HR: 83 (24 @ 05:50) (70 - 91)  BP: 93/50 (24 @ 05:50) (93/50 - 122/81)  BP(mean): --  RR: 19 (24 @ 05:50) (15 - 19)  SpO2: 96% (24 @ 05:50) (96% - 100%)    ======Physical exams======  GENERAL: NAD  Psych: AAOx3. Soft spoken. Tangential.  Cardiovascular: Tachycardic, S1 S2, no m/r/g, no JVD  LUNGS: Unlabored respiration, CTABL  ABDOMEN: Soft, NTND  EXTREMITIES: Warm extremities, no edema, peripheral pulses 2+ bilaterally, upper and lower extremity contractures      ======Labs======                8.2[L]  2.84[L] >----------< 179  (MCV: 90.6)                25.0[L]   142 | 106 | 16  -----------------------< 118[H]  4.0 | 28 | 0.32[L]    TPro: 5.2[L] / Alb: 3.4 / TBili: 0.4 / DBili: -- / AlkPhos: 57 / ALT: 19 / AST: 18 (12-02-24 @ 15:34)  Ca: 8.4 / Phos: 2.8 / M.10 (24 @ 15:34)    Gas: 7.31[L] / 42 / 36 / 21[L] / 64.8[L]% / -4.8[L] (24 @ 17:49)      ======Microbiology======  Urinalysis Basic - ( 02 Dec 2024 15:34 )    Color: x / Appearance: x / SG: x / pH: x  Gluc: 118 mg/dL / Ketone: x  / Bili: x / Urobili: x   Blood: x / Protein: x / Nitrite: x   Leuk Esterase: x / RBC: x / WBC x   Sq Epi: x / Non Sq Epi: x / Bacteria: x          ======I&O's======  I&O's Summary

## 2024-12-03 NOTE — PROGRESS NOTE ADULT - PROBLEM SELECTOR PLAN 4
CT scan showing proctitis which may be source of sepsis. Also w/ possible urinary stones    - continue with zosyn  - bowel regimen  - Encourage hydration

## 2024-12-03 NOTE — DISCHARGE NOTE PROVIDER - NSDCCPCAREPLAN_GEN_ALL_CORE_FT
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 You were given steroids, fluids, and blood to stabilize your vital signs. You also had a procedure performed to stop the bleeding in your stomach. Please continue to take pantoprazole 40 mg daily prior to breakfast and avoid taking Excedrin. Please return to the ED if you develop vomiting, significant abdominal pain, or bloody/black stool. Follow-up with GI in 2 weeks.      SECONDARY DISCHARGE DIAGNOSES  Diagnosis: Proctitis  Assessment and Plan of Treatment: You have inflammation of your intestines which likely contributed to your low blood pressure. You were treated with antibiotics. Please return to the ED if you develop fever, chills, significant abdominal pain, vomiting, or diarrhea. Follow-up with GI in 2 weeks.

## 2024-12-03 NOTE — PROGRESS NOTE ADULT - PROBLEM SELECTOR PLAN 1
patient hypotensive on admission  could be due to hypovolemic/hemorrhagic in setting of possible GI bleed given anemia on presentation  may also be due to septic shock, source can be proctitis   s/p fluid and pRBC resuscitation in ED    PLAN  - d/c Zosyn on 12/2  - d/c fluids, solu-cortef, and midodrine. Started oral steroid taper on 12/1, d/c'd on 12/2.  - follow up infectious workup UCx and blood cx (RVP and UA -)  - TTE w/ LVEF 60-65%  - appreciate GI recs, s/p EGD with concern for oozing gastric AVM  - maintain 2 large bore IVs and PPI BID

## 2024-12-03 NOTE — PROGRESS NOTE ADULT - TIME BILLING
reviewing laboratory data, consultants' recommendations, documentation in [place], performing medically appropriate examinations/evaluations, discussion with patient/family/RN/ACP/Residents and interdisciplinary staff (such as , social workers, etc), counseling patient/family/care giver, ordering medically appropriate medication, tests, or procedures
reviewing laboratory data, consultants' recommendations, documentation in [place], performing medically appropriate examinations/evaluations, discussion with patient/family/RN/ACP/Residents and interdisciplinary staff (such as , social workers, etc), counseling patient/family/care giver, ordering medically appropriate medication, tests, or procedures
review of laboratory data, radiology results, consultants' recommendations, documentation in [place], discussion with interdisciplinary staff (such as , social workers, etc). Interventions were performed as documented above.
reviewing laboratory data, consultants' recommendations, documentation in [place], performing medically appropriate examinations/evaluations, discussion with patient/family/RN/ACP/Residents and interdisciplinary staff (such as , social workers, etc), counseling patient/family/care giver, ordering medically appropriate medication, tests, or procedures
review of laboratory data, radiology results, consultants' recommendations, documentation in [place], discussion with interdisciplinary staff (such as , social workers, etc). Interventions were performed as documented above.

## 2024-12-03 NOTE — DISCHARGE NOTE PROVIDER - NSDCFUADDAPPT_GEN_ALL_CORE_FT
APPTS ARE READY TO BE MADE: [X] YES    Additional Information about above appointments (if needed):    1: f/u with Gastroenterology in 2 weeks  2: Re-establish care for Multiple Sclerosis with Neurology in 1 month   APPTS ARE READY TO BE MADE: [X] YES    Additional Information about above appointments (if needed):    1: f/u with Gastroenterology in 2 weeks  2: Re-establish care for Multiple Sclerosis with Neurology in 1 month  3. dentistry-within 2 weeks

## 2024-12-03 NOTE — BH CONSULTATION LIAISON PROGRESS NOTE - NSBHCONSULTFOLLOWAFTERCARE_PSY_A_CORE FT
can f.u with providers outaptient  no need for psychiatric inpatient care  [facility] [phone] (M-F 9am-7pm)   [facility] [place] [phone]

## 2024-12-03 NOTE — DISCHARGE NOTE PROVIDER - NSDCCPTREATMENT_GEN_ALL_CORE_FT
PRINCIPAL PROCEDURE  Procedure: Esophagogastroduodenoscopy with control of hemorrhage  Findings and Treatment: During your hospitalization, the gastroenterologists used a camera to examine your esophagus, stomach, and part of your small intestine. During this procedure, they found a arteriovenous malformation which was oozing blood. The bleeding was stopped using argon plasma coagulation.      SECONDARY PROCEDURE  Procedure: Complete transthoracic echocardiography (TTE)  Findings and Treatment: 1. Left ventricular systolic function is normal with an ejection fraction visually estimated at 60 to 65 %.   2. Normal right ventricular cavity size and normal right ventricular systolic function. Tricuspid annular plane systolic excursion (TAPSE) is 2.2 cm (normal >=1.7 cm).   3. The aortic valve appears trileaflet with normal systolic excursion. There is calcification of the aortic valve leaflets. There is moderate aortic regurgitation.   4. Structurally normal mitral valve with normal leaflet excursion. Chordal systolic anteriormotion of the mitral valve. There is mild to moderate mitral regurgitation.   5. Left atrium is normal in size.   6. No pericardial effusion seen.

## 2024-12-03 NOTE — BH CONSULTATION LIAISON PROGRESS NOTE - NSBHASSESSMENTFT_PSY_ALL_CORE
Patient is a 63 yo F with pmhx of MS and R femoral fx s/p hip arthroplasty comes to the ED after experiencing palpitations found to be in undifferentiated hemorrhagic/hypovolemic vs septic shock. Also found to be FOBT+; has proctitis. Patient comes from home, lives alone with 24 hr HHA. Patient with PPHx of depression, no inpatient admissions, no SA. No substance abuse.  psychiatry called for depression and paranoia.    12/3: presents with ongoing perseveration on bedsore and care, denies others trying to harm her    PLAN  - routine observation, no SI or HI  - MEDICATIONS  -- xanax - I stop confirmed 0.5mg BID PRN  -- Lexapro 20mg daily   -- Adderall - I stop confirmed 20mg qam  -- trazodone 100mg qhs ( HOLD for lethargy )  - Steroids stopped by primary team, CL will follow and monitor symptoms   - PRN for mild agitation: Seroquel 25mg q6hrs PRN  - PRN for severe agitation: Haldol 0.5mg q6hrs PRN

## 2024-12-03 NOTE — BH CONSULTATION LIAISON PROGRESS NOTE - CURRENT MEDICATION
MEDICATIONS  (STANDING):  amphetamine/dextroamphetamine XR 20 milliGRAM(s) Oral with breakfast  atorvastatin 20 milliGRAM(s) Oral at bedtime  baclofen 20 milliGRAM(s) Oral every 12 hours  bisacodyl 5 milliGRAM(s) Oral at bedtime  chlorhexidine 2% Cloths 1 Application(s) Topical <User Schedule>  dextrose 5%. 1000 milliLiter(s) (50 mL/Hr) IV Continuous <Continuous>  dextrose 5%. 1000 milliLiter(s) (100 mL/Hr) IV Continuous <Continuous>  dextrose 50% Injectable 25 Gram(s) IV Push once  dextrose 50% Injectable 12.5 Gram(s) IV Push once  dextrose 50% Injectable 25 Gram(s) IV Push once  escitalopram 20 milliGRAM(s) Oral daily  gabapentin 300 milliGRAM(s) Oral every 8 hours  glucagon  Injectable 1 milliGRAM(s) IntraMuscular once  insulin lispro (ADMELOG) corrective regimen sliding scale   SubCutaneous three times a day before meals  insulin lispro (ADMELOG) corrective regimen sliding scale   SubCutaneous at bedtime  lamoTRIgine 100 milliGRAM(s) Oral two times a day  mupirocin 2% Nasal 1 Application(s) Both Nostrils two times a day  pantoprazole    Tablet 40 milliGRAM(s) Oral before breakfast  polyethylene glycol 3350 17 Gram(s) Oral daily  senna 1 Tablet(s) Oral at bedtime  traZODone 100 milliGRAM(s) Oral at bedtime    MEDICATIONS  (PRN):  acetaminophen     Tablet .. 650 milliGRAM(s) Oral every 6 hours PRN Temp greater or equal to 38C (100.4F), Mild Pain (1 - 3)  albuterol/ipratropium for Nebulization 3 milliLiter(s) Nebulizer every 6 hours PRN Shortness of Breath and/or Wheezing  ALPRAZolam 0.5 milliGRAM(s) Oral two times a day PRN anxiety  bisacodyl Suppository 10 milliGRAM(s) Rectal daily PRN Constipation  dextrose Oral Gel 15 Gram(s) Oral once PRN Blood Glucose LESS THAN 70 milliGRAM(s)/deciliter  haloperidol     Tablet 0.5 milliGRAM(s) Oral every 6 hours PRN severe agitation  QUEtiapine 25 milliGRAM(s) Oral every 6 hours PRN mild agitation

## 2024-12-03 NOTE — BH CONSULTATION LIAISON PROGRESS NOTE - NSBHCHARTREVIEWLAB_PSY_A_CORE FT
7.6    2.79  )-----------( 148      ( 03 Dec 2024 05:17 )             23.4   12-03    144  |  107  |  18  ----------------------------<  93  3.9   |  26  |  0.39[L]    Ca    8.1[L]      03 Dec 2024 05:17  Phos  2.7     12-03  Mg     2.10     12-03    TPro  5.2[L]  /  Alb  3.4  /  TBili  0.4  /  DBili  x   /  AST  18  /  ALT  19  /  AlkPhos  57  12-02

## 2024-12-03 NOTE — PROGRESS NOTE ADULT - PROBLEM SELECTOR PLAN 7
Diet: AAT  Dispo: pending additional workup  Code: Full  DVT ppx: SCDs Diet: Regular diet, constant carbs  Dispo: Pending availablity of HHAs.  Code: Full  DVT ppx: SCDs

## 2024-12-03 NOTE — DISCHARGE NOTE PROVIDER - CARE PROVIDER_API CALL
Penelope Amaya  Gastroenterology  [address] [place] [address], [facility] [address]  [place] [address]  Phone: [phone]  Fax: [phone]  Established Patient  Follow Up Time: 2 weeks

## 2024-12-03 NOTE — DISCHARGE NOTE PROVIDER - HOSPITAL COURSE
63 yo F with pmhx of MS and R femoral fx s/p hip arthroplasty comes to the ED after experiencing palpitations. She began started experiencing palpitations a few days ago and her symptoms persisted. She notified her aide of this, which prompted her to come to the ED. She cannot identify any alleviating, aggravating, nor inciting factors for her palpitations. She also endorses unintentional weight loss, however is unable to state how much and over in what time period. She has also not experienced syncope nor near syncope, recent fevers nor night sweats. Today, the patient states her symptoms are improved when she first arrived to the hospital and has no other acute complaints. She is also bedbound at baseline. Of note, the patient was noted to be taking Vumertiy for MS, however she no longer takes this due to leukopenia she experienced.    In the ED, the patient's vitals were notable for hypotension, tachycardia and was placed on supplemental oxygen. Her labs showed anemia and an elevated lactate. A CT Ch/A/P was significant for proctitis and possible urinary bladder stones. She received 1.9L fluid resuscitation, vanc/zosyn, one unit prbc transfusion, solu-cortef and was on levophed, which is now off, and had an ng tube placed. She is admitted for further management.    During hospitalization, patient was given an additional 2 units of pRBC. Patient underwent EGD which discovered an oozing gastric AVM which was ablated. Patient also underwent TTE for palpations, which was had no abnormal findings. After EGD w/ ablation, patient's symptoms improved. Antibiotics and steroids were discontinued and patient remained without signs of shock. Patient discharged to home with A and follow-up with GI. Patient should also reestablish care with Neurology for management of her multiple sclerosis and neuropsychiatric medication regimen.    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 She began started experiencing palpitations a few days ago and her symptoms persisted. She notified her aide of this, which prompted her to come to the ED. She cannot identify any alleviating, aggravating, nor inciting factors for her palpitations. She also endorses unintentional weight loss, however is unable to state how much and over in what time period. She has also not experienced syncope nor near syncope, recent fevers nor night sweats. Today, the patient states her symptoms are improved when she first arrived to the hospital and has no other acute complaints. She is also bedbound at baseline. Of note, the patient was noted to be taking Vumertiy for MS, however she no longer takes this due to leukopenia she experienced.    In the ED, the patient's vitals were notable for hypotension, tachycardia and was placed on supplemental oxygen. Her labs showed anemia and an elevated lactate. A CT Ch/A/P was significant for proctitis and possible urinary bladder stones. She received 1.9L fluid resuscitation, vanc/zosyn, one unit prbc transfusion, solu-cortef and was on levophed, which is now off, and had an ng tube placed.    During hospitalization, patient was given an additional 2 units of pRBC. Patient underwent EGD which discovered an oozing gastric AVM which was ablated. Patient also underwent TTE for palpations, which was had no abnormal findings. After EGD w/ ablation, patient's symptoms improved. Antibiotics and steroids were discontinued and patient remained without signs of shock. Patient discharged to home with A and follow-up with GI. Patient should also reestablish care with Neurology for management of her multiple sclerosis and neuropsychiatric medication regimen.    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

## 2024-12-03 NOTE — DISCHARGE NOTE PROVIDER - NSFOLLOWUPCLINICS_GEN_ALL_ED_FT
[facility]  Dentistry  .  [place] [address]  Phone: [phone]  Fax:   Follow Up Time: 2 weeks    [facility]  Neurology  [address] [place] [id number]  Phone: [phone]  Fax:   Follow Up Time: 2 weeks

## 2024-12-03 NOTE — DISCHARGE NOTE PROVIDER - NSDCHHNEEDSERVICE_GEN_ALL_CORE
Observation and assessment Observation and assessment/Rehabilitation services/Wound care and assessment

## 2024-12-04 ENCOUNTER — TRANSCRIPTION ENCOUNTER (OUTPATIENT)
Age: 64
End: 2024-12-04

## 2024-12-04 VITALS
RESPIRATION RATE: 18 BRPM | TEMPERATURE: 98 F | SYSTOLIC BLOOD PRESSURE: 102 MMHG | WEIGHT: 83.11 LBS | OXYGEN SATURATION: 100 % | DIASTOLIC BLOOD PRESSURE: 63 MMHG | HEART RATE: 84 BPM

## 2024-12-04 LAB
BASOPHILS # BLD AUTO: 0.01 K/UL — SIGNIFICANT CHANGE UP (ref 0–0.2)
BASOPHILS NFR BLD AUTO: 0.4 % — SIGNIFICANT CHANGE UP (ref 0–2)
EOSINOPHIL # BLD AUTO: 0.07 K/UL — SIGNIFICANT CHANGE UP (ref 0–0.5)
EOSINOPHIL NFR BLD AUTO: 3.1 % — SIGNIFICANT CHANGE UP (ref 0–6)
HCT VFR BLD CALC: 24.5 % — LOW (ref 34.5–45)
HGB BLD-MCNC: 7.8 G/DL — LOW (ref 11.5–15.5)
IANC: 0.23 K/UL — LOW (ref 1.8–7.4)
IMM GRANULOCYTES NFR BLD AUTO: 0 % — SIGNIFICANT CHANGE UP (ref 0–0.9)
LYMPHOCYTES # BLD AUTO: 1.52 K/UL — SIGNIFICANT CHANGE UP (ref 1–3.3)
LYMPHOCYTES # BLD AUTO: 67.6 % — HIGH (ref 13–44)
MCHC RBC-ENTMCNC: 29.4 PG — SIGNIFICANT CHANGE UP (ref 27–34)
MCHC RBC-ENTMCNC: 31.8 G/DL — LOW (ref 32–36)
MCV RBC AUTO: 92.5 FL — SIGNIFICANT CHANGE UP (ref 80–100)
MONOCYTES # BLD AUTO: 0.42 K/UL — SIGNIFICANT CHANGE UP (ref 0–0.9)
MONOCYTES NFR BLD AUTO: 18.7 % — HIGH (ref 2–14)
NEUTROPHILS # BLD AUTO: 0.23 K/UL — LOW (ref 1.8–7.4)
NEUTROPHILS NFR BLD AUTO: 10.2 % — LOW (ref 43–77)
NRBC # BLD: 0 /100 WBCS — SIGNIFICANT CHANGE UP (ref 0–0)
NRBC # FLD: 0 K/UL — SIGNIFICANT CHANGE UP (ref 0–0)
PLATELET # BLD AUTO: 146 K/UL — LOW (ref 150–400)
RBC # BLD: 2.65 M/UL — LOW (ref 3.8–5.2)
RBC # FLD: 21.8 % — HIGH (ref 10.3–14.5)
WBC # BLD: 2.25 K/UL — LOW (ref 3.8–10.5)
WBC # FLD AUTO: 2.25 K/UL — LOW (ref 3.8–10.5)

## 2024-12-04 PROCEDURE — 99239 HOSP IP/OBS DSCHRG MGMT >30: CPT

## 2024-12-04 RX ORDER — ACETAMINOPHEN 500MG 500 MG/1
675 TABLET, COATED ORAL ONCE
Refills: 0 | Status: COMPLETED | OUTPATIENT
Start: 2024-12-04 | End: 2024-12-04

## 2024-12-04 RX ORDER — LIDOCAINE 40 MG/G
1 CREAM TOPICAL ONCE
Refills: 0 | Status: COMPLETED | OUTPATIENT
Start: 2024-12-04 | End: 2024-12-04

## 2024-12-04 RX ADMIN — ESCITALOPRAM OXALATE 20 MILLIGRAM(S): 10 TABLET, FILM COATED ORAL at 12:10

## 2024-12-04 RX ADMIN — GABAPENTIN 300 MILLIGRAM(S): 300 CAPSULE ORAL at 13:04

## 2024-12-04 RX ADMIN — ACETAMINOPHEN 500MG 270 MILLIGRAM(S): 500 TABLET, COATED ORAL at 09:25

## 2024-12-04 RX ADMIN — Medication 1 APPLICATION(S): at 05:58

## 2024-12-04 RX ADMIN — ACETAMINOPHEN 500MG 675 MILLIGRAM(S): 500 TABLET, COATED ORAL at 10:25

## 2024-12-04 RX ADMIN — GABAPENTIN 300 MILLIGRAM(S): 300 CAPSULE ORAL at 05:53

## 2024-12-04 RX ADMIN — LIDOCAINE 1 APPLICATION(S): 40 CREAM TOPICAL at 11:09

## 2024-12-04 RX ADMIN — PANTOPRAZOLE SODIUM 40 MILLIGRAM(S): 40 TABLET, DELAYED RELEASE ORAL at 07:08

## 2024-12-04 RX ADMIN — DEXTROAMPHETAMINE SACCHARATE, AMPHETAMINE ASPARTATE, DEXTROAMPHETAMINE SULFATE AND AMPHETAMINE SULFATE 20 MILLIGRAM(S): 1.25; 1.25; 1.25; 1.25 TABLET ORAL at 09:30

## 2024-12-04 RX ADMIN — CHLORHEXIDINE GLUCONATE 1 APPLICATION(S): 1.2 RINSE ORAL at 05:58

## 2024-12-04 RX ADMIN — Medication 20 MILLIGRAM(S): at 05:53

## 2024-12-04 RX ADMIN — LAMOTRIGINE 100 MILLIGRAM(S): 50 TABLET, EXTENDED RELEASE ORAL at 05:54

## 2024-12-04 NOTE — DISCHARGE NOTE NURSING/CASE MANAGEMENT/SOCIAL WORK - PATIENT PORTAL LINK FT
You can access the FollowMyHealth Patient Portal offered by [facility] by registering at the following website: [email or web address]. By joining [email or web address]’s FollowMyHealth portal, you will also be able to view your health information using other applications (apps) compatible with our system.

## 2024-12-04 NOTE — PROGRESS NOTE ADULT - PROBLEM SELECTOR PLAN 3
Patient with unclear psych hx and psych follow-up. On numerous psychiatric medications outpatient. Increasingly paranoid throughout hospitalization, possibly in setting of stress dose steroids vs. underlying medical or psychiatric condition.    Plan  - d/c steroids 12/2  - psychiatry consult, recommending PRN Haldol or Seroquel for agitation

## 2024-12-04 NOTE — ADVANCED PRACTICE NURSE CONSULT - ASSESSMENT
General: A&Ox4, bedbound, contracted hands, rigid LEs, incontinent of urine and stool- external female urinary  in place. Patient thin, frail. Skin warm, dry, scattered areas of ecchymosis on bilateral upper extremities. Blanchable erythema on bilateral heels. Left thigh hyperpigmentation from previous graft site. Left inner buttock mole.     Sacrum with soft tissue contraction and scar issue, otherwise skin intact.

## 2024-12-04 NOTE — PROGRESS NOTE ADULT - ATTENDING COMMENTS
Seen and examined, agree with above. 64F MS p/w anemia now s/p EGD w/ APC of oozing gastric AVM. Hgb stable, BUN:Cr normalized, HDS. ADAT, PPI BID x14 days. Outpatient GI follow up.
63 yo F with PMHx MS, R. femoral fx s/p hip arthroplasty,  trigeminal neuralgia. anxiety /depression presenting w/ SOB, palpitations, N/V,  found to be anemic  5.5 admitted for hypovolemic shock, briefly on IV Pressors in the E.D, now off, admitted to medicine for further management.    CTA Chest/ CT A/P: No pulmonary embolism. Proctitis. Layering hyperdensity within the left posterolateral urinary bladder, possibly urinary stones.    #Shock  --Initally c/f hemorrhagic shock given low Hgb, however NGT placed in the E.D, w/o bloody output  --Reasonable to r/o infectious +/- cardiac etiology  --On IV Zosyn (11/28-  --GI consulted for possible scope  --C/w IV PPI BID   --s/p 1 unit PRBC, 2nd unit Pending  --Started on stress dose steroids 11/28 Solu-cortef 100mg Q8---> dec to Solu-cortef 50mg IV Q8  --Will defer checking AM cortisol at this time as pt already on steroids  --Resume PO midodrine 10mg TID  --Obtain Echo  --Monitor pressures  --Monitor CBCs   --Judicious use of IVF given elevated BNP     #SIRS   --Met SIRS criteria on admission w/ tachycardia to 113, RR 22  --Possibly i/s/o proctitis + shock state  --s/p Vanc and zosyn in the E.D  --Hold Vanc  --C/w IV zosyn  --Check procal, trend lactate  --F/u blood and urine cx  --Rest of mgt as above    #Acute hypoxic respiratory failure  --Suspect multifactorial i/s/o anemia +/- anxiety  --Hospital course c/b by RRT 11/29 for hypoxic episode i/s/o ?panic attack  --Pt visibly anxious during RRT , states she was upset by "a woman" and became unable to breathe  --O2 sat improved s/p IV ativan 0.5mg x 1  --c/w home meds for anxiety   --Obtain CXR  --Duonebs Q6H PRN  --Now saturating well on RA    #Hx MS  --Previously on Vumerity - no longer on medication 2/2 side effects  --Outpt follow up w/ neurology       DVT: Hold given c/f GIB  DIET: NPO pending possible procedure  DISPO: Pending hospital course
65 yo F with PMHx MS, R. femoral fx s/p hip arthroplasty,  trigeminal neuralgia. anxiety /depression presenting w/ SOB, palpitations, N/V,  found to be anemic  5.5 admitted for hypovolemic shock, briefly on IV Pressors in the E.D, now off, admitted to medicine for further management.    CTA Chest/ CT A/P: No pulmonary embolism. Proctitis. Layering hyperdensity within the left posterolateral urinary bladder, possibly urinary stones.    #Shock  --Initally c/f hemorrhagic shock given low Hgb, however NGT placed in the E.D, w/o bloody output  --Reasonable to r/o infectious +/- cardiac etiology  --On IV Zosyn (11/28-  --GI consulted s/p EGD 11/29 w/ Possible angioectasia in the stomach with active oozing. Oozing AVM  treated with argon beam coagulation.   --C/w IV PPI BID   --s/p 1 unit PRBC, 2nd unit Pending  --Started on stress dose steroids --> dec to Solu-cortef 50mg IV Q12. Can likely dc tomorrow   --c/w PO midodrine 10mg TID switch to PRN   --Obtain Echo  --Monitor pressures  --Monitor CBCs   --Judicious use of IVF given elevated BNP     #SIRS   --Met SIRS criteria on admission w/ tachycardia to 113, RR 22  --Possibly i/s/o proctitis + shock state  --s/p Vanc and zosyn in the E.D  --Hold Vanc  --C/w IV zosyn  --blood CX ngtd, urine cx w/ EColi  --Rest of mgt as above    #Acute hypoxic respiratory failure  --Suspect multifactorial i/s/o anemia +/- anxiety  --Hospital course c/b by RRT 11/29 for hypoxic episode i/s/o ?panic attack  --Pt visibly anxious during RRT , states she was upset by "a woman" and became unable to breathe  --O2 sat improved s/p IV ativan 0.5mg x 1  --c/w home meds for anxiety   --F/u CXR  --Duonebs Q6H PRN  --Now saturating well on RA    #Hx MS  --Previously on Vumerity - no longer on medication 2/2 side effects  --Outpt follow up w/ neurology     #Hypocalcemia   --Noted  --Check PTH/ Vit D  --Monitor BMPs    DVT: Hold given c/f GIB  DIET: Advance as tolerated.  DISPO: Pending hospital course .  Likely home per PT
A 64F with MS (doesn't follow neurologist), R femoral hip fracture s/p arthroplasty prior to admission, presents with palpitations, hypotension and acute blood loss anemia 2/2 bleeding AVM s/p Argon therapy.  Pt was initially placed on stress dose steroids now discontinued and remains hemodynamically stable.  Pt eager to go home and better positioned in her bed at home.     #Acute blood loss anemia:  - H/H stabilizing, slight downtrend today, no recent BM to report.   - Resume PPI daily     #Hypotension in setting of hemorrhagic/hypovolemic shock, now improved:  - Initially started on stress dose steroids and transitioned to medrol dose pack  - Discontinued medrol dose pack as steroids may be contributing to worsening anxiety/paranoid behavior, BP remains stable     #MS:   - Med rec reviewed, does not follow neurologist for her MS    #Anxiety: anticipate her anxiety to improve once steroids are off  - Pt on multiple medications and does not follow a psychiatrist for her anxiety/MDD  -  consulted to assist with medication management, would benefit from regular outpt follow up    Medically stable for dc home today  DC time: 35 minutes
63 yo F with PMHx MS, R. femoral fx s/p hip arthroplasty,  trigeminal neuralgia. anxiety /depression presenting w/ SOB, palpitations, N/V,  found to be anemic  5.5 admitted for hypovolemic shock, briefly on IV Pressors in the E.D, now off, admitted to medicine for further management.    CTA Chest/ CT A/P: No pulmonary embolism. Proctitis. Layering hyperdensity within the left posterolateral urinary bladder, possibly urinary stones.    #Shock  --Initally c/f hemorrhagic shock given low Hgb, however NGT placed in the E.D, w/o bloody output  --Reasonable to r/o infectious +/- cardiac etiology  --On IV Zosyn (11/28-  --GI consulted s/p EGD 11/29 w/ Possible angioectasia in the stomach with active oozing. Oozing AVM  treated with argon beam coagulation.   --C/w  PPI transition to 20mg PO BID   --s/p 2 units PRBC  --Started empirically stress dose steroids --> D/C IV solucortef with transition to PO medrol dosePak to complete taper  --d/c midodrine   --Echo w/ EF 60-65%  --Monitor pressures  --Monitor CBCs      #SIRS   --Met SIRS criteria on admission w/ tachycardia to 113, RR 22  --Possibly i/s/o proctitis + shock state  --s/p Vanc and zosyn in the E.D  --Hold Vanc  --C/w IV zosyn  --blood CX ngtd, urine cx w/ EColi  --Rest of mgt as above    #Acute hypoxic respiratory failure  --Suspect multifactorial i/s/o anemia +/- anxiety  --Hospital course c/b by RRT 11/29 for hypoxic episode i/s/o ?panic attack  --Pt visibly anxious during RRT , states she was upset by "a woman" and became unable to breathe  --O2 sat improved s/p IV ativan 0.5mg x 1  --c/w home meds for anxiety   --Duonebs Q6H PRN  --Now saturating well on RA    #Hx MS  --Previously on Vumerity - no longer on medication 2/2 side effects  --Outpt follow up w/ neurology       DVT: Hold given c/f GIB  DIET: Advance as tolerated.  DISPO: Pending hospital course .  Likely home per PT .
Pt seen and examined, chart and labs reviewed.  A 64F with MS (doesn't follow neurologist), R femoral hip fracture s/p arthroplasty prior to admission, presents with palpitations, hypotension and acute blood loss anemia 2/2 bleeding AVM s/p Argon therapy.  Pt was initially placed on stress dose steroids now discontinued.  Pt transferred to new floor and feels better and less paranoid.  Still fixated on a 'bedsore' that is causing her pain, however on exam appears that sore is more scar tissue/keloid.     #Acute blood loss anemia:  - H/H stabilizing, slight downtrend today, no recent BM to report.   - Resume PPI daily     #Hypotension in setting of hemorrhagic/hypovolemic shock, now improved:  - Initially started on stress dose steroids and transitioned to medrol dose pack  - Discontinued medrol dose pack as steroids may be contributing to worsening anxiety/paranoid behavior, BP remains stable     #MS:   - Med rec reviewed, does not follow neurologist for her MS    #Anxiety: anticipate her anxiety to improve once steroids are off  - Pt on multiple medications and does not follow a psychiatrist for her anxiety/MDD  -  consulted to assist with medication management, would benefit from regular outpt follow up, consult reviewed     Anticipate dc home tomorrow once 24 HHA reinstated
Pt seen and examined, chart and labs reviewed.  A 64F with MS (doesn't follow neurologist), R femoral hip fracture s/p arthroplasty prior to admission, presents with palpitations, hypotension and acute blood loss anemia 2/2 bleeding AVM s/p Argon therapy.  Pt was initially placed on stress dose steroids; today, pt exhibiting severe anxiety and paranoia, possibly in setting of steroid use.  Anemia and GIB now improved.    #Acute blood loss anemia:  - H/H stabilizing, no labs drawn today, pt remains hemodynamically stable  - Can reduce PPI to once daily     #Hypotension in setting of hemorrhagic/hypovolemic shock, now improved:  - Initially started on stress dose steroids and transitioned to medrol dose pack  - Would dc medrol dose pack as steroids may be contributing to worsening anxiety/paranoid behavior    #MS:   - Med rec reviewed, does not follow neurologist for her MS    #Anxiety: anticipate her anxiety to improve once steroids are off  - Pt on multiple medications and does not follow a psychiatrist for her anxiety/MDD  -  consulted inpatient to assist with medication management, would benefit from regular outpt follow up    Anticipate dc home tomorrow

## 2024-12-04 NOTE — DISCHARGE NOTE NURSING/CASE MANAGEMENT/SOCIAL WORK - NSDCPNINST_GEN_ALL_CORE
Patient remain alert and oriented, Tylenol IV given fro generalized body pain and lidocaine cream for right knee pain, blood sugar monitor no insulin coverage required, IV access discontinued for discharge, patient discharge per discharge order.

## 2024-12-04 NOTE — PROGRESS NOTE ADULT - PROBLEM SELECTOR PLAN 7
Diet: Regular diet, constant carbs  Dispo: Pending availablity of HHAs.  Code: Full  DVT ppx: SCDs Diet: Regular diet, constant carbs  Dispo: Home with 24-hour HHA.  Code: Full  DVT ppx: SCDs

## 2024-12-04 NOTE — DISCHARGE NOTE NURSING/CASE MANAGEMENT/SOCIAL WORK - NSDCFUADDAPPT_GEN_ALL_CORE_FT
APPTS ARE READY TO BE MADE: [X] YES    Additional Information about above appointments (if needed):    1: f/u with Gastroenterology in 2 weeks  2: Re-establish care for Multiple Sclerosis with Neurology in 1 month  3. dentistry-within 2 weeks

## 2024-12-04 NOTE — PROGRESS NOTE ADULT - ASSESSMENT
63 yo F with PMHx MS, F. femoral fx s/p hip arthroplasty, hx of trigeminal neuralgia presenting w/ palpitations found to be anemia to 5s. GI consulted for anemia and c/f GIB.     #Hx MS  #Hx Trigeminal neuralgia  #Migraines  #Macrocytic anemia  GI consulted for reports of melena and anemia on presentation initiatlly requiring pressor support after patient presented with palpiatations. 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 Likely source of melena. No further evidence of overt bleeding noted, mild decrease in hgb, however may be late presenting from prior bleeding.    Recommendations:   - Would repeat hgb in am, if stable and w/o further evidence of clinically significant bleeding, no further work up from GI perspective  - Advance diet as tolerated     Note incomplete until finalized by attending signature/attestation.    Rigoberto Contreras  GI/Hepatology Fellow, PGY-4    MONDAY-FRIDAY 8AM-5PM:  Please message via [employer] or email [email or web address]     On Weekends/Holidays (All day) and Weekdays after 5 PM to 8 AM  For nonurgent consults please email:  Please email [email or web address]  For urgent consults:  Please contact on call GI team. See Amion schedule ([facility]), [employer] paging system ([facility]), or call hospital  ([facility]) 
63 yo F with pmhx of MS and R femoral fx s/p hip arthroplasty comes to the ED after experiencing palpitations. On admission, vitals significant for hypotension and tachycardia with labs showing hemoglobin 5.5 and a lactate>7 s/p fluid resuscitation, one unit pRBC transfusion, solu-cortef, and levophed (now off). She is admitted for further management.
63 yo F with pmhx of MS and R femoral fx s/p hip arthroplasty comes to the ED after experiencing palpitations. On admission, vitals significant for hypotension and tachycardia with labs showing hemoglobin 5.5 and a lactate>7 s/p fluid resuscitation, one unit pRBC transfusion, solu-cortef, and levophed (now off). She is admitted for further management.
65 yo F with pmhx of MS and R femoral fx s/p hip arthroplasty comes to the ED after experiencing palpitations. On admission, vitals significant for hypotension and tachycardia with labs showing hemoglobin 5.5 and a lactate>7 s/p fluid resuscitation, one unit pRBC transfusion, solu-cortef, and levophed (now off). She is admitted for further management.
63 yo F with pmhx of MS and R femoral fx s/p hip arthroplasty comes to the ED after experiencing palpitations. On admission, vitals significant for hypotension and tachycardia with labs showing hemoglobin 5.5 and a lactate>7 s/p fluid resuscitation, one unit pRBC transfusion, solu-cortef, and levophed (now off). She is admitted for further management.
65 yo F with pmhx of MS and R femoral fx s/p hip arthroplasty comes to the ED after experiencing palpitations. On admission, vitals significant for hypotension and tachycardia with labs showing hemoglobin 5.5 and a lactate>7 s/p fluid resuscitation, one unit pRBC transfusion, solu-cortef, and levophed (now off). She is admitted for further management.
65 yo F with pmhx of MS and R femoral fx s/p hip arthroplasty comes to the ED after experiencing palpitations. On admission, vitals significant for hypotension and tachycardia with labs showing hemoglobin 5.5 and a lactate>7 s/p fluid resuscitation, one unit pRBC transfusion, solu-cortef, and levophed (now off). She is admitted for further management.

## 2024-12-04 NOTE — ADVANCED PRACTICE NURSE CONSULT - REASON FOR CONSULT
Attempted to see patient on Wound Care Rounds, patient off unit at this time. Will follow up with patient for wound care recommendations. 
Patient seen on skin care rounds after wound care referral received for assessment of skin impairment and recommendations of topical management. Patient H/O of MS and R femoral fx s/p hip arthroplasty comes to the ED after experiencing palpitations. On admission, vitals significant for hypotension and tachycardia with labs showing hemoglobin 5.5 and a lactate>7 s/p fluid resuscitation, one unit pRBC transfusion, solu-cortef, and levophed (now off). She is admitted for further management.  Chart reviewed: WBC 2.25, H/H 7.8/24.5, platelets 146,  Serum albumin 3.4, BMI 19.7, Misael 13.     Patient stating she has pain to her backside as site of previous injury very tender, patient educated on importance of turning/positioning and offloading.

## 2024-12-04 NOTE — ADVANCED PRACTICE NURSE CONSULT - RECOMMEDATIONS
Please contact Wound Care Service Line if we can be of further assistance (ext [phone]). 
Continue to offload with fluidized positioning pillow- patient educated on appropriate placement, recommended to take it home with her.     Seat cushion provided as well- educated on use. Questions answered.     Please reconsult if any wound changes or if we can be of further assistance (ext [phone]).

## 2024-12-04 NOTE — PROGRESS NOTE ADULT - SUBJECTIVE AND OBJECTIVE BOX
Patient is a 64y old  Female who presents with a chief complaint of anemia, hypotension (03 Dec 2024 11:12)      ======Overnight/Subjective======  Overnight    Subjective    Brief daily plan    ======Medications======  MEDICATIONS  (STANDING):  acetaminophen   Oral Liquid .. 650 milliGRAM(s) Oral once  amphetamine/dextroamphetamine XR 20 milliGRAM(s) Oral with breakfast  atorvastatin 20 milliGRAM(s) Oral at bedtime  baclofen 20 milliGRAM(s) Oral every 12 hours  bisacodyl 5 milliGRAM(s) Oral at bedtime  chlorhexidine 2% Cloths 1 Application(s) Topical <User Schedule>  dextrose 5%. 1000 milliLiter(s) (100 mL/Hr) IV Continuous <Continuous>  dextrose 5%. 1000 milliLiter(s) (50 mL/Hr) IV Continuous <Continuous>  dextrose 50% Injectable 25 Gram(s) IV Push once  dextrose 50% Injectable 12.5 Gram(s) IV Push once  dextrose 50% Injectable 25 Gram(s) IV Push once  escitalopram 20 milliGRAM(s) Oral daily  gabapentin 300 milliGRAM(s) Oral every 8 hours  glucagon  Injectable 1 milliGRAM(s) IntraMuscular once  insulin lispro (ADMELOG) corrective regimen sliding scale   SubCutaneous three times a day before meals  insulin lispro (ADMELOG) corrective regimen sliding scale   SubCutaneous at bedtime  lamoTRIgine 100 milliGRAM(s) Oral two times a day  pantoprazole    Tablet 40 milliGRAM(s) Oral before breakfast  polyethylene glycol 3350 17 Gram(s) Oral daily  senna 1 Tablet(s) Oral at bedtime  traZODone 100 milliGRAM(s) Oral at bedtime    MEDICATIONS  (PRN):  acetaminophen     Tablet .. 650 milliGRAM(s) Oral every 6 hours PRN Temp greater or equal to 38C (100.4F), Mild Pain (1 - 3)  albuterol/ipratropium for Nebulization 3 milliLiter(s) Nebulizer every 6 hours PRN Shortness of Breath and/or Wheezing  ALPRAZolam 0.5 milliGRAM(s) Oral two times a day PRN anxiety  bisacodyl Suppository 10 milliGRAM(s) Rectal daily PRN Constipation  dextrose Oral Gel 15 Gram(s) Oral once PRN Blood Glucose LESS THAN 70 milliGRAM(s)/deciliter  haloperidol     Tablet 0.5 milliGRAM(s) Oral every 6 hours PRN severe agitation  QUEtiapine 25 milliGRAM(s) Oral every 6 hours PRN mild agitation      ======Vital Signs======  T(C): 36.7 (24 @ 05:36), Max: 37 (24 @ 21:38)  T(F): 98.1 (24 @ 05:36), Max: 98.6 (24 @ 21:38)  HR: 84 (24 @ 05:36) (84 - 90)  BP: 102/63 (24 @ 05:36) (99/69 - 126/80)  BP(mean): --  RR: 18 (24 @ 05:36) (18 - 18)  SpO2: 100% (24 @ 05:36) (98% - 100%)    ======Physical exams======  GENERAL: NAD  Cardiovascular: RRR, S1 S2, no m/r/g, no JVD  LUNGS: Unlabored respiration, CTABL  ABDOMEN: Soft, NTND  EXTREMITIES: Warm extremities, no edema, peripheral pulses 2+ bilaterally  NEURO: AAOx3, PERRLA    ======Labs======                7.8[L]  2.25[L] >----------< 146[L]  (MCV: 92.5)                24.5[L]   144 | 107 | 18  -----------------------< 93  3.9 | 26 | 0.39[L]    TPro: 5.2[L] / Alb: 3.4 / TBili: 0.4 / DBili: -- / AlkPhos: 57 / ALT: 19 / AST: 18 (24 @ 15:34)  Ca: 8.1[L] / Phos: 2.7 / M.10 (24 @ 05:17)    Gas: 7.31[L] / 42 / 36 / 21[L] / 64.8[L]% / -4.8[L] (24 @ 17:49)      ======Microbiology======  Urinalysis Basic - ( 03 Dec 2024 05:17 )    Color: x / Appearance: x / SG: x / pH: x  Gluc: 93 mg/dL / Ketone: x  / Bili: x / Urobili: x   Blood: x / Protein: x / Nitrite: x   Leuk Esterase: x / RBC: x / WBC x   Sq Epi: x / Non Sq Epi: x / Bacteria: x          ======I&O's======  I&O's Summary    03 Dec 2024 07:01  -  04 Dec 2024 07:00  --------------------------------------------------------  IN: 500 mL / OUT: 700 mL / NET: -200 mL         Patient is a 64y old  Female who presents with a chief complaint of anemia, hypotension (03 Dec 2024 11:12)      ======Overnight/Subjective======  No events overnight. Patient with pain in her R hip and R knee. Perseverative about nursing staff not positioning her properly and requesting to speak with wound care prior to discharge. No other concerns today.    Brief daily plan  - Tylenol and lidocaine cream for R leg pain  - f/u wound care recs    ======Medications======  MEDICATIONS  (STANDING):  acetaminophen   Oral Liquid .. 650 milliGRAM(s) Oral once  amphetamine/dextroamphetamine XR 20 milliGRAM(s) Oral with breakfast  atorvastatin 20 milliGRAM(s) Oral at bedtime  baclofen 20 milliGRAM(s) Oral every 12 hours  bisacodyl 5 milliGRAM(s) Oral at bedtime  chlorhexidine 2% Cloths 1 Application(s) Topical <User Schedule>  dextrose 5%. 1000 milliLiter(s) (100 mL/Hr) IV Continuous <Continuous>  dextrose 5%. 1000 milliLiter(s) (50 mL/Hr) IV Continuous <Continuous>  dextrose 50% Injectable 25 Gram(s) IV Push once  dextrose 50% Injectable 12.5 Gram(s) IV Push once  dextrose 50% Injectable 25 Gram(s) IV Push once  escitalopram 20 milliGRAM(s) Oral daily  gabapentin 300 milliGRAM(s) Oral every 8 hours  glucagon  Injectable 1 milliGRAM(s) IntraMuscular once  insulin lispro (ADMELOG) corrective regimen sliding scale   SubCutaneous three times a day before meals  insulin lispro (ADMELOG) corrective regimen sliding scale   SubCutaneous at bedtime  lamoTRIgine 100 milliGRAM(s) Oral two times a day  pantoprazole    Tablet 40 milliGRAM(s) Oral before breakfast  polyethylene glycol 3350 17 Gram(s) Oral daily  senna 1 Tablet(s) Oral at bedtime  traZODone 100 milliGRAM(s) Oral at bedtime    MEDICATIONS  (PRN):  acetaminophen     Tablet .. 650 milliGRAM(s) Oral every 6 hours PRN Temp greater or equal to 38C (100.4F), Mild Pain (1 - 3)  albuterol/ipratropium for Nebulization 3 milliLiter(s) Nebulizer every 6 hours PRN Shortness of Breath and/or Wheezing  ALPRAZolam 0.5 milliGRAM(s) Oral two times a day PRN anxiety  bisacodyl Suppository 10 milliGRAM(s) Rectal daily PRN Constipation  dextrose Oral Gel 15 Gram(s) Oral once PRN Blood Glucose LESS THAN 70 milliGRAM(s)/deciliter  haloperidol     Tablet 0.5 milliGRAM(s) Oral every 6 hours PRN severe agitation  QUEtiapine 25 milliGRAM(s) Oral every 6 hours PRN mild agitation      ======Vital Signs======  T(C): 36.7 (24 @ 05:36), Max: 37 (24 @ 21:38)  T(F): 98.1 (24 @ 05:36), Max: 98.6 (24 @ 21:38)  HR: 84 (24 @ 05:36) (84 - 90)  BP: 102/63 (24 @ 05:36) (99/69 - 126/80)  BP(mean): --  RR: 18 (24 @ 05:36) (18 - 18)  SpO2: 100% (24 @ 05:36) (98% - 100%)    ======Physical exams======  GENERAL: NAD  Psych: AAOx3. Soft spoken. Tangential.  Cardiovascular: Tachycardic, S1 S2, no m/r/g, no JVD  LUNGS: Unlabored respiration, CTABL  ABDOMEN: Soft, NTND  EXTREMITIES: Warm extremities, no edema, peripheral pulses 2+ bilaterally, upper and lower extremity contractures    ======Labs======                7.8[L]  2.25[L] >----------< 146[L]  (MCV: 92.5)                24.5[L]   144 | 107 | 18  -----------------------< 93  3.9 | 26 | 0.39[L]    TPro: 5.2[L] / Alb: 3.4 / TBili: 0.4 / DBili: -- / AlkPhos: 57 / ALT: 19 / AST: 18 (24 @ 15:34)  Ca: 8.1[L] / Phos: 2.7 / M.10 (24 @ 05:17)    Gas: 7.31[L] / 42 / 36 / 21[L] / 64.8[L]% / -4.8[L] (24 @ 17:49)      ======Microbiology======  Urinalysis Basic - ( 03 Dec 2024 05:17 )    Color: x / Appearance: x / SG: x / pH: x  Gluc: 93 mg/dL / Ketone: x  / Bili: x / Urobili: x   Blood: x / Protein: x / Nitrite: x   Leuk Esterase: x / RBC: x / WBC x   Sq Epi: x / Non Sq Epi: x / Bacteria: x          ======I&O's======  I&O's Summary    03 Dec 2024 07:01  -  04 Dec 2024 07:00  --------------------------------------------------------  IN: 500 mL / OUT: 700 mL / NET: -200 mL         Patient is a 64y old  Female who presents with a chief complaint of anemia, hypotension (03 Dec 2024 11:12)      ======Overnight/Subjective======  No events overnight. Patient with pain in her R hip and R knee. Perseverative about nursing staff not positioning her properly and requesting to speak with wound care prior to discharge. No other concerns today.    Brief daily plan  - Tylenol and lidocaine cream for R leg pain  - f/u wound care recs  - discharge to home with 24-hour HHA    ======Medications======  MEDICATIONS  (STANDING):  acetaminophen   Oral Liquid .. 650 milliGRAM(s) Oral once  amphetamine/dextroamphetamine XR 20 milliGRAM(s) Oral with breakfast  atorvastatin 20 milliGRAM(s) Oral at bedtime  baclofen 20 milliGRAM(s) Oral every 12 hours  bisacodyl 5 milliGRAM(s) Oral at bedtime  chlorhexidine 2% Cloths 1 Application(s) Topical <User Schedule>  dextrose 5%. 1000 milliLiter(s) (100 mL/Hr) IV Continuous <Continuous>  dextrose 5%. 1000 milliLiter(s) (50 mL/Hr) IV Continuous <Continuous>  dextrose 50% Injectable 25 Gram(s) IV Push once  dextrose 50% Injectable 12.5 Gram(s) IV Push once  dextrose 50% Injectable 25 Gram(s) IV Push once  escitalopram 20 milliGRAM(s) Oral daily  gabapentin 300 milliGRAM(s) Oral every 8 hours  glucagon  Injectable 1 milliGRAM(s) IntraMuscular once  insulin lispro (ADMELOG) corrective regimen sliding scale   SubCutaneous three times a day before meals  insulin lispro (ADMELOG) corrective regimen sliding scale   SubCutaneous at bedtime  lamoTRIgine 100 milliGRAM(s) Oral two times a day  pantoprazole    Tablet 40 milliGRAM(s) Oral before breakfast  polyethylene glycol 3350 17 Gram(s) Oral daily  senna 1 Tablet(s) Oral at bedtime  traZODone 100 milliGRAM(s) Oral at bedtime    MEDICATIONS  (PRN):  acetaminophen     Tablet .. 650 milliGRAM(s) Oral every 6 hours PRN Temp greater or equal to 38C (100.4F), Mild Pain (1 - 3)  albuterol/ipratropium for Nebulization 3 milliLiter(s) Nebulizer every 6 hours PRN Shortness of Breath and/or Wheezing  ALPRAZolam 0.5 milliGRAM(s) Oral two times a day PRN anxiety  bisacodyl Suppository 10 milliGRAM(s) Rectal daily PRN Constipation  dextrose Oral Gel 15 Gram(s) Oral once PRN Blood Glucose LESS THAN 70 milliGRAM(s)/deciliter  haloperidol     Tablet 0.5 milliGRAM(s) Oral every 6 hours PRN severe agitation  QUEtiapine 25 milliGRAM(s) Oral every 6 hours PRN mild agitation      ======Vital Signs======  T(C): 36.7 (24 @ 05:36), Max: 37 (24 @ 21:38)  T(F): 98.1 (24 @ 05:36), Max: 98.6 (24 @ 21:38)  HR: 84 (24 @ 05:36) (84 - 90)  BP: 102/63 (24 @ 05:36) (99/69 - 126/80)  BP(mean): --  RR: 18 (24 @ 05:36) (18 - 18)  SpO2: 100% (24 @ 05:36) (98% - 100%)    ======Physical exams======  GENERAL: NAD  Psych: AAOx3. Soft spoken. Tangential.  Cardiovascular: Tachycardic, S1 S2, no m/r/g, no JVD  LUNGS: Unlabored respiration, CTABL  ABDOMEN: Soft, NTND  EXTREMITIES: Warm extremities, no edema, peripheral pulses 2+ bilaterally, upper and lower extremity contractures    ======Labs======                7.8[L]  2.25[L] >----------< 146[L]  (MCV: 92.5)                24.5[L]   144 | 107 | 18  -----------------------< 93  3.9 | 26 | 0.39[L]    TPro: 5.2[L] / Alb: 3.4 / TBili: 0.4 / DBili: -- / AlkPhos: 57 / ALT: 19 / AST: 18 (24 @ 15:34)  Ca: 8.1[L] / Phos: 2.7 / M.10 (24 @ 05:17)    Gas: 7.31[L] / 42 / 36 / 21[L] / 64.8[L]% / -4.8[L] (24 @ 17:49)      ======Microbiology======  Urinalysis Basic - ( 03 Dec 2024 05:17 )    Color: x / Appearance: x / SG: x / pH: x  Gluc: 93 mg/dL / Ketone: x  / Bili: x / Urobili: x   Blood: x / Protein: x / Nitrite: x   Leuk Esterase: x / RBC: x / WBC x   Sq Epi: x / Non Sq Epi: x / Bacteria: x          ======I&O's======  I&O's Summary    03 Dec 2024 07:01  -  04 Dec 2024 07:00  --------------------------------------------------------  IN: 500 mL / OUT: 700 mL / NET: -200 mL

## 2024-12-04 NOTE — PROGRESS NOTE ADULT - REASON FOR ADMISSION
anemia, hypotension

## 2024-12-04 NOTE — PROGRESS NOTE ADULT - PROVIDER SPECIALTY LIST ADULT
Internal Medicine
Gastroenterology
Internal Medicine

## 2024-12-04 NOTE — DISCHARGE NOTE NURSING/CASE MANAGEMENT/SOCIAL WORK - FINANCIAL ASSISTANCE
[facility] provides services at a reduced cost to those who are determined to be eligible through [facility]’s financial assistance program. Information regarding [facility]’s financial assistance program can be found by going to [email or web address] or by calling [phone].

## 2025-03-25 NOTE — PATIENT PROFILE ADULT - NSPROPTRIGHTNOTIFY_GEN_A_NUR
Pt arrives from [facility] via EMS. Staff reports they noticed pt was having some confusion at dinner and unable to use walker yesterday approx 1700. Today at 0600 staff noticed R sided weakness with facial droop. Upon arrival pt A&O x1.    declines

## 2025-05-22 NOTE — PATIENT PROFILE ADULT - FALL HARM RISK - FALLEN IN PAST
Detail Level: Simple
Patient Specific Otc Recommendations (Will Not Stick From Patient To Patient): CeraVe SA wash
No

## (undated) DEVICE — SOL IRR POUR H2O 1500ML

## (undated) DEVICE — VENODYNE/SCD SLEEVE CALF MEDIUM

## (undated) DEVICE — FEMORAL CANAL SUCTION TIP IRR SYS

## (undated) DEVICE — CATH IV SAFE BC 22G X 1" (BLUE)

## (undated) DEVICE — TAPE SILK 3"

## (undated) DEVICE — PACK IV START WITH CHG

## (undated) DEVICE — LIJ-CONSIGN SYNTHES TITANIUM TFN LOCKING SET: Type: DURABLE MEDICAL EQUIPMENT

## (undated) DEVICE — LABELS BLANK W PEN

## (undated) DEVICE — SAW BLADE STRYKER SAGITTAL 3 HOLE OSCILLATING

## (undated) DEVICE — TUBING IV SET GRAVITY 3Y 100" MACRO

## (undated) DEVICE — ELCTR ECG CONDUCTIVE ADHESIVE

## (undated) DEVICE — BRUSH INTMEDUL 19MM

## (undated) DEVICE — DENTURE CUP PINK

## (undated) DEVICE — UNDERPAD LINEN SAVER 17 X 24"

## (undated) DEVICE — CANAL FEMORAL DISP LG

## (undated) DEVICE — PRESSURIZER FEM CNL W/O HUB MED

## (undated) DEVICE — Device

## (undated) DEVICE — DRSG WEBRIL 4"

## (undated) DEVICE — GLV 8 PROTEXIS (WHITE)

## (undated) DEVICE — ELCTR GROUNDING PAD ADULT COVIDIEN

## (undated) DEVICE — DRSG 2X2

## (undated) DEVICE — BASIN EMESIS 10IN GRADUATED MAUVE

## (undated) DEVICE — CANISTER DISPOSABLE THIN WALL 3000CC

## (undated) DEVICE — SUCTION YANKAUER NO CONTROL VENT

## (undated) DEVICE — TUBING MEDI-VAC W MAXIGRIP CONNECTORS 1/4"X6'

## (undated) DEVICE — LAP PAD W RING 18 X 18"

## (undated) DEVICE — PREP SCRUB BRUSH W CHG 4%

## (undated) DEVICE — SOL IRR POUR NS 0.9% 1500ML

## (undated) DEVICE — LUBRICATING JELLY HR ONE SHOT 3G

## (undated) DEVICE — DRAPE C ARM UNIVERSAL

## (undated) DEVICE — SALIVA EJECTOR (BLUE)

## (undated) DEVICE — POSITIONER STRAP ARMBOARD VELCRO TS-30

## (undated) DEVICE — PACK LIJ BASIC ORTHO

## (undated) DEVICE — DRSG XEROFORM 5 X 9"

## (undated) DEVICE — SUT VICRYL 0 27" OS-6 UNDYED

## (undated) DEVICE — DRAPE SHOWER CURTAIN ISOLATION

## (undated) DEVICE — SUT VICRYL 1 36" CTX UNDYED

## (undated) DEVICE — DRAPE U POLY BLUE 60"X60"

## (undated) DEVICE — GLV 8.5 PROTEXIS (WHITE)

## (undated) DEVICE — PROBE FIAPC CIRC O.D. 2.3MM/6.9FR LNTH 220CM/7.2FT

## (undated) DEVICE — BIOPSY FORCEP RADIAL JAW 4 STANDARD WITH NEEDLE

## (undated) DEVICE — BIOPSY FORCEP COLD DISP

## (undated) DEVICE — SUT VICRYL 2-0 27" FS-1 UNDYED

## (undated) DEVICE — BITE BLOCK ADULT 20 X 27MM (GREEN)

## (undated) DEVICE — DRSG KLING 4"

## (undated) DEVICE — SUCTION YANKAUER OPEN TIP NO VENT CURVE

## (undated) DEVICE — DRAPE 3/4 SHEET 52X76"

## (undated) DEVICE — LIJ-CONSIGN SYNTHES TFN PERCUTANEOUS & LAG SCREW INST: Type: DURABLE MEDICAL EQUIPMENT

## (undated) DEVICE — CONTAINER FORMALIN 80ML YELLOW

## (undated) DEVICE — CLAMP BX HOT RAD JAW 3

## (undated) DEVICE — GOWN LG

## (undated) DEVICE — PREP CHLORAPREP HI-LITE ORANGE 26ML

## (undated) DEVICE — DRSG CURITY GAUZE SPONGE 4 X 4" 12-PLY NON-STERILE

## (undated) DEVICE — DRSG ADAPTIC 3 X 8"

## (undated) DEVICE — STAPLER SKIN VISI-STAT 35 WIDE

## (undated) DEVICE — SUT MONOCRYL 4-0 27" PS-2 UNDYED

## (undated) DEVICE — DRSG BANDAID 0.75X3"

## (undated) DEVICE — DRSG COBAN 4" LF NONSTERILE